# Patient Record
Sex: FEMALE | Race: OTHER | HISPANIC OR LATINO
[De-identification: names, ages, dates, MRNs, and addresses within clinical notes are randomized per-mention and may not be internally consistent; named-entity substitution may affect disease eponyms.]

---

## 2019-01-17 ENCOUNTER — TRANSCRIPTION ENCOUNTER (OUTPATIENT)
Age: 29
End: 2019-01-17

## 2019-01-25 ENCOUNTER — TRANSCRIPTION ENCOUNTER (OUTPATIENT)
Age: 29
End: 2019-01-25

## 2019-05-30 ENCOUNTER — TRANSCRIPTION ENCOUNTER (OUTPATIENT)
Age: 29
End: 2019-05-30

## 2019-05-31 ENCOUNTER — TRANSCRIPTION ENCOUNTER (OUTPATIENT)
Age: 29
End: 2019-05-31

## 2019-06-06 ENCOUNTER — APPOINTMENT (OUTPATIENT)
Dept: FAMILY MEDICINE | Facility: CLINIC | Age: 29
End: 2019-06-06
Payer: COMMERCIAL

## 2019-06-06 DIAGNOSIS — J45.20 MILD INTERMITTENT ASTHMA, UNCOMPLICATED: ICD-10-CM

## 2019-06-06 PROCEDURE — 99385 PREV VISIT NEW AGE 18-39: CPT

## 2019-06-06 PROCEDURE — 99213 OFFICE O/P EST LOW 20 MIN: CPT | Mod: 25

## 2019-06-06 NOTE — PLAN
[FreeTextEntry1] : Palpitations; likely 2/2 panic disorder\par - Recent EKG 5/31- NSR\par - Unlikely underlying cardiac etiology, symptoms likely have an anxiety component\par - Refer to cardiology, may need follow up with cardiology for Holter monitoring testing \par - Check routine bloodwork- CBC/BMP/TSH

## 2019-06-06 NOTE — PAST MEDICAL HISTORY
[Definite ___ (Date)] : the last menstrual period was [unfilled] [Abnormal Duration ___ days] : the duration was abnormal lasting [unfilled] days [Regular Cycle Intervals] : have been regular [Total Preg ___] : G: [unfilled]

## 2019-06-06 NOTE — REVIEW OF SYSTEMS
[Palpitations] : palpitations [Fever] : no fever [Fatigue] : no fatigue [Chills] : no chills [Chest Pain] : no chest pain [Night Sweats] : no night sweats [Lower Ext Edema] : no lower extremity edema [Orthopnea] : no orthopnea [Shortness Of Breath] : no shortness of breath [Wheezing] : no wheezing [Cough] : no cough [Nausea] : no nausea [Abdominal Pain] : no abdominal pain [Heartburn] : no heartburn [Vomiting] : no vomiting [Dysuria] : no dysuria [Incontinence] : no incontinence [Hematuria] : no hematuria [Joint Pain] : no joint pain [Headache] : no headache [Muscle Pain] : no muscle pain [Back Pain] : no back pain [Memory Loss] : no memory loss [Dizziness] : no dizziness [Fainting] : no fainting

## 2019-06-06 NOTE — PHYSICAL EXAM
[Well Nourished] : well nourished [No Acute Distress] : no acute distress [Well-Appearing] : well-appearing [Well Developed] : well developed [No JVD] : no jugular venous distention [Supple] : supple [No Respiratory Distress] : no respiratory distress  [Clear to Auscultation] : lungs were clear to auscultation bilaterally [Normal Rate] : normal rate  [Regular Rhythm] : with a regular rhythm [Normal S1, S2] : normal S1 and S2 [No Edema] : there was no peripheral edema [No Murmur] : no murmur heard [Soft] : abdomen soft [Non Tender] : non-tender [Normal Bowel Sounds] : normal bowel sounds [Non-distended] : non-distended [No HSM] : no HSM [No CVA Tenderness] : no CVA  tenderness [No Joint Swelling] : no joint swelling [No Rash] : no rash [Normal Gait] : normal gait [Normal Affect] : the affect was normal [Coordination Grossly Intact] : coordination grossly intact

## 2019-06-06 NOTE — HEALTH RISK ASSESSMENT
[0] : 2) Feeling down, depressed, or hopeless: Not at all (0) [[id number]] : 0 [de-identified] : Socially [] : No

## 2019-06-06 NOTE — HISTORY OF PRESENT ILLNESS
[FreeTextEntry8] : 28 YO F PMHx asthma and PCOS here for palpitations for 1 years, worsening over the last month. Tends to happen at work and when stress although last week awoke from sleep with palpitations, HR around 140s. No chest pain. Increased diaphoresis and SOB during these episodes. In college, was on Lexapro for 8 months but stopped. Drink 1 cup of coffee per day. No energy drinks. No smoking. Severe episodes occur approximately 1 time per month. Can feel episodes coming on, tries relaxation techniques. Recently at urgent care 5/31, EKG normal; BP ~ 139 systolic; HR in 80s. No medication given.

## 2019-06-13 ENCOUNTER — APPOINTMENT (OUTPATIENT)
Dept: CARDIOLOGY | Facility: CLINIC | Age: 29
End: 2019-06-13
Payer: COMMERCIAL

## 2019-06-13 ENCOUNTER — NON-APPOINTMENT (OUTPATIENT)
Age: 29
End: 2019-06-13

## 2019-06-13 VITALS
HEART RATE: 69 BPM | HEIGHT: 65 IN | SYSTOLIC BLOOD PRESSURE: 112 MMHG | WEIGHT: 180 LBS | OXYGEN SATURATION: 99 % | BODY MASS INDEX: 29.99 KG/M2 | DIASTOLIC BLOOD PRESSURE: 78 MMHG

## 2019-06-13 DIAGNOSIS — Z82.49 FAMILY HISTORY OF ISCHEMIC HEART DISEASE AND OTHER DISEASES OF THE CIRCULATORY SYSTEM: ICD-10-CM

## 2019-06-13 DIAGNOSIS — F41.9 ANXIETY DISORDER, UNSPECIFIED: ICD-10-CM

## 2019-06-13 PROCEDURE — 93000 ELECTROCARDIOGRAM COMPLETE: CPT

## 2019-06-13 PROCEDURE — 99204 OFFICE O/P NEW MOD 45 MIN: CPT | Mod: 25

## 2019-06-13 NOTE — ASSESSMENT
[FreeTextEntry1] : Patient with above hx \par \par recurrent palpitations with recent prolonged period of palpitations , possible PSVT ,   anxiety , \par \par \par would obtain routine blood work , echocardiogram to asses ventricular function , exercise stress test  to rule out exercise induced arrhythmia , 30 days  telemetry .\par \par  follow up after above test

## 2019-06-13 NOTE — HISTORY OF PRESENT ILLNESS
[FreeTextEntry1] : 29 year old female with hx prior hx of anxiety, asthma  who came for cardiac evaluation with complain that she was having palpitations on and off at work place for almost one year , but had palpitations while she a sleep , her heart rate was in 130 to 150s , associated with mild shortness  of breath  lasted for 2 minutes which warranted her to go urgent care , \par \par some time isolated  beats some time in group of beats ,  lasted for 2 minutes  no associated chest pain , Patient says some times it did occur with exercise ,    Patient does not drink much coffee\par \par Patient says she had anxiety , was on SSRI  , discontinued 6 years ago , \par  \par

## 2019-06-13 NOTE — DISCUSSION/SUMMARY
[Stable] : stable [Anxiety] : anxiety disorder NOS [None] : There are no changes in medication management [de-identified] : rule out SVT  [Patient] : the patient

## 2019-06-13 NOTE — PHYSICAL EXAM
[General Appearance - Well Developed] : well developed [Normal Conjunctiva] : the conjunctiva exhibited no abnormalities [Normal Oral Mucosa] : normal oral mucosa [Heart Rate And Rhythm] : heart rate and rhythm were normal [Normal Jugular Venous A Waves Present] : normal jugular venous A waves present [Heart Sounds] : normal S1 and S2 [Murmurs] : no murmurs present [Arterial Pulses Normal] : the arterial pulses were normal [Veins - Varicosity Changes] : no varicosital changes were noted in the lower extremities [Edema] : no peripheral edema present [Exaggerated Use Of Accessory Muscles For Inspiration] : no accessory muscle use [Respiration, Rhythm And Depth] : normal respiratory rhythm and effort [] : no respiratory distress [Auscultation Breath Sounds / Voice Sounds] : lungs were clear to auscultation bilaterally [Chest Palpation] : palpation of the chest revealed no abnormalities [Lungs Percussion] : the lungs were normal to percussion [Bowel Sounds] : normal bowel sounds [Abdomen Soft] : soft [Abnormal Walk] : normal gait [Nail Clubbing] : no clubbing of the fingernails [Cyanosis, Localized] : no localized cyanosis [Skin Color & Pigmentation] : normal skin color and pigmentation [Oriented To Time, Place, And Person] : oriented to person, place, and time

## 2019-06-18 ENCOUNTER — TRANSCRIPTION ENCOUNTER (OUTPATIENT)
Age: 29
End: 2019-06-18

## 2019-06-18 ENCOUNTER — APPOINTMENT (OUTPATIENT)
Dept: CARDIOLOGY | Facility: CLINIC | Age: 29
End: 2019-06-18
Payer: COMMERCIAL

## 2019-06-18 PROCEDURE — 93306 TTE W/DOPPLER COMPLETE: CPT

## 2019-06-19 ENCOUNTER — APPOINTMENT (OUTPATIENT)
Dept: CARDIOLOGY | Facility: CLINIC | Age: 29
End: 2019-06-19
Payer: COMMERCIAL

## 2019-06-19 PROCEDURE — 93015 CV STRESS TEST SUPVJ I&R: CPT

## 2019-06-20 ENCOUNTER — TRANSCRIPTION ENCOUNTER (OUTPATIENT)
Age: 29
End: 2019-06-20

## 2019-06-20 LAB
ALBUMIN SERPL ELPH-MCNC: 4.7 G/DL
ALP BLD-CCNC: 90 U/L
ALT SERPL-CCNC: 41 U/L
ANION GAP SERPL CALC-SCNC: 13 MMOL/L
AST SERPL-CCNC: 26 U/L
BASOPHILS # BLD AUTO: 0.07 K/UL
BASOPHILS NFR BLD AUTO: 0.8 %
BILIRUB SERPL-MCNC: 0.2 MG/DL
BUN SERPL-MCNC: 9 MG/DL
CALCIUM SERPL-MCNC: 9.6 MG/DL
CHLORIDE SERPL-SCNC: 103 MMOL/L
CHOLEST SERPL-MCNC: 233 MG/DL
CHOLEST/HDLC SERPL: 4 RATIO
CO2 SERPL-SCNC: 25 MMOL/L
CREAT SERPL-MCNC: 0.62 MG/DL
EOSINOPHIL # BLD AUTO: 0.17 K/UL
EOSINOPHIL NFR BLD AUTO: 1.9 %
GLUCOSE SERPL-MCNC: 91 MG/DL
HCT VFR BLD CALC: 42 %
HDLC SERPL-MCNC: 59 MG/DL
HGB BLD-MCNC: 12.9 G/DL
IMM GRANULOCYTES NFR BLD AUTO: 0.6 %
LDLC SERPL CALC-MCNC: 144 MG/DL
LYMPHOCYTES # BLD AUTO: 1.54 K/UL
LYMPHOCYTES NFR BLD AUTO: 17.2 %
MAN DIFF?: NORMAL
MCHC RBC-ENTMCNC: 30.4 PG
MCHC RBC-ENTMCNC: 30.7 GM/DL
MCV RBC AUTO: 98.8 FL
MONOCYTES # BLD AUTO: 0.61 K/UL
MONOCYTES NFR BLD AUTO: 6.8 %
NEUTROPHILS # BLD AUTO: 6.52 K/UL
NEUTROPHILS NFR BLD AUTO: 72.7 %
PLATELET # BLD AUTO: 376 K/UL
POTASSIUM SERPL-SCNC: 4.3 MMOL/L
PROT SERPL-MCNC: 7.4 G/DL
RBC # BLD: 4.25 M/UL
RBC # FLD: 12.5 %
SODIUM SERPL-SCNC: 141 MMOL/L
TRIGL SERPL-MCNC: 148 MG/DL
TSH SERPL-ACNC: 0.75 UIU/ML
WBC # FLD AUTO: 8.96 K/UL

## 2019-08-08 ENCOUNTER — APPOINTMENT (OUTPATIENT)
Dept: CARDIOLOGY | Facility: CLINIC | Age: 29
End: 2019-08-08

## 2019-09-24 ENCOUNTER — TRANSCRIPTION ENCOUNTER (OUTPATIENT)
Age: 29
End: 2019-09-24

## 2019-10-03 ENCOUNTER — FORM ENCOUNTER (OUTPATIENT)
Age: 29
End: 2019-10-03

## 2019-10-04 ENCOUNTER — OUTPATIENT (OUTPATIENT)
Dept: OUTPATIENT SERVICES | Facility: HOSPITAL | Age: 29
LOS: 1 days | End: 2019-10-04
Payer: COMMERCIAL

## 2019-10-04 ENCOUNTER — APPOINTMENT (OUTPATIENT)
Dept: PULMONOLOGY | Facility: CLINIC | Age: 29
End: 2019-10-04
Payer: COMMERCIAL

## 2019-10-04 VITALS
HEART RATE: 81 BPM | WEIGHT: 180 LBS | OXYGEN SATURATION: 98 % | BODY MASS INDEX: 29.99 KG/M2 | HEIGHT: 65 IN | DIASTOLIC BLOOD PRESSURE: 80 MMHG | SYSTOLIC BLOOD PRESSURE: 130 MMHG

## 2019-10-04 DIAGNOSIS — Z87.19 PERSONAL HISTORY OF OTHER DISEASES OF THE DIGESTIVE SYSTEM: ICD-10-CM

## 2019-10-04 DIAGNOSIS — Z87.42 PERSONAL HISTORY OF OTHER DISEASES OF THE FEMALE GENITAL TRACT: ICD-10-CM

## 2019-10-04 PROCEDURE — 71046 X-RAY EXAM CHEST 2 VIEWS: CPT | Mod: 26

## 2019-10-04 PROCEDURE — 99204 OFFICE O/P NEW MOD 45 MIN: CPT | Mod: 25

## 2019-10-04 PROCEDURE — 85018 HEMOGLOBIN: CPT | Mod: QW

## 2019-10-04 PROCEDURE — 94010 BREATHING CAPACITY TEST: CPT

## 2019-10-04 PROCEDURE — 70220 X-RAY EXAM OF SINUSES: CPT | Mod: 26

## 2019-10-04 PROCEDURE — 94729 DIFFUSING CAPACITY: CPT

## 2019-10-04 PROCEDURE — 94727 GAS DIL/WSHOT DETER LNG VOL: CPT

## 2019-10-04 NOTE — DISCUSSION/SUMMARY
[FreeTextEntry1] : \par #1. PFTs performed today are essentially normal.\par #2. The patient does not appear to require chronic BD therapy at this time\par #3. Proventil as needed\par #4. Continue Singulair for now\par #5. Diet and exercise for weight loss\par #6. Continue Flonase and add Astelin for PNDS\par #7. Consider ENT/allergy evaluation if no improvement\par #8. Check CXR and sinus xray to r/o infiltrate/sinusitis\par #9. F/u in 6 weeks\par

## 2019-10-04 NOTE — REVIEW OF SYSTEMS
[Nasal Congestion] : nasal congestion [Postnasal Drip] : postnasal drip [Sinus Problems] : sinus problems [Cough] : cough [Anxiety] : anxiety [Fever] : no fever [Chills] : no chills [Dry Eyes] : no dryness of the eyes [Eye Irritation] : no ~T irritation of the eyes [Epistaxis] : no nosebleeds [Dyspnea] : no dyspnea [Sputum] : not coughing up ~M sputum [Chest Tightness] : no chest tightness [Wheezing] : no wheezing [Pleuritic Pain] : no pleuritic pain [Hypertension] : no ~T hypertension [Chest Discomfort] : no chest discomfort [Dysrhythmia] : no dysrhythmia [Murmurs] : no murmurs were heard [Palpitations] : no palpitations [Edema] : ~T edema was not present [Hay Fever] : no hay fever [Itchy Eyes] : no itching of ~T the eyes [Reflux] : no reflux [Nausea] : no nausea [Vomiting] : no vomiting [Constipation] : no constipation [Diarrhea] : no diarrhea [Abdominal Pain] : no abdominal pain [Trauma] : no ~T physical trauma [Fracture] : no fracture [Anemia] : no anemia [Headache] : no headache [Dizziness] : no dizziness [Syncope] : no fainting [Numbness] : no numbness [Paralysis] : no paralysis was seen [Seizures] : no seizures [Depression] : no depression [Diabetes] : no diabetes mellitus [Thyroid Problem] : no thyroid problem

## 2019-10-04 NOTE — REASON FOR VISIT
[Initial Evaluation] : an initial evaluation [Asthma] : asthma [Shortness of Breath] : shortness of Breath [Cough] : cough [FreeTextEntry2] : weight issues

## 2019-10-04 NOTE — HISTORY OF PRESENT ILLNESS
[FreeTextEntry1] : The patient presents for a cough over the past week. She reports a h/o childhood asthma for which she was on Asmanex but did not require chronic therapy as an adult. She reports some increased symptoms of chest tightness and SOB this past summer and more recently was seen in UC for cough and SOB. She was given abx, Montelukast, and 50 mg of prednisone for 5 days. She reports that she was improving with prednisone but with recurrence of symptoms now. She c/o nasal and sinus congestion as well as PNDS.

## 2019-10-04 NOTE — CONSULT LETTER
[Dear  ___] : Dear  [unfilled], [Consult Letter:] : I had the pleasure of evaluating your patient, [unfilled]. [Please see my note below.] : Please see my note below. [Consult Closing:] : Thank you very much for allowing me to participate in the care of this patient.  If you have any questions, please do not hesitate to contact me. [Sincerely,] : Sincerely, [FreeTextEntry3] : Augustus Vuong MD, FCCP, D. ABSM\par Pulmonary and Sleep Medicine\par [facility] [place]\par

## 2019-10-04 NOTE — PHYSICAL EXAM
[General Appearance - Well Developed] : well developed [Normal Appearance] : normal appearance [General Appearance - In No Acute Distress] : no acute distress [Normal Conjunctiva] : the conjunctiva exhibited no abnormalities [Elongated Uvula] : elongated uvula [Low Lying Soft Palate] : low lying soft palate [Enlarged Base of the Tongue] : enlargement of the base of the tongue [III] : III [Neck Appearance] : the appearance of the neck was normal [Heart Rate And Rhythm] : heart rate and rhythm were normal [Murmurs] : no murmurs present [Heart Sounds] : normal S1 and S2 [Edema] : no peripheral edema present [] : no respiratory distress [Respiration, Rhythm And Depth] : normal respiratory rhythm and effort [Exaggerated Use Of Accessory Muscles For Inspiration] : no accessory muscle use [Auscultation Breath Sounds / Voice Sounds] : lungs were clear to auscultation bilaterally [Abdomen Soft] : soft [Abdomen Tenderness] : non-tender [Abnormal Walk] : normal gait [Nail Clubbing] : no clubbing of the fingernails [Cyanosis, Localized] : no localized cyanosis [No Focal Deficits] : no focal deficits [Oriented To Time, Place, And Person] : oriented to person, place, and time [FreeTextEntry1] : No abnormalities.

## 2019-10-07 ENCOUNTER — RESULT REVIEW (OUTPATIENT)
Age: 29
End: 2019-10-07

## 2019-10-30 ENCOUNTER — APPOINTMENT (OUTPATIENT)
Dept: INFECTIOUS DISEASE | Facility: CLINIC | Age: 29
End: 2019-10-30
Payer: SELF-PAY

## 2019-10-30 DIAGNOSIS — Z71.89 OTHER SPECIFIED COUNSELING: ICD-10-CM

## 2019-10-30 PROCEDURE — 90691 TYPHOID VACCINE IM: CPT

## 2019-10-30 PROCEDURE — 90738 INACTIVATED JE VACC IM: CPT

## 2019-10-30 PROCEDURE — 90632 HEPA VACCINE ADULT IM: CPT

## 2019-10-30 PROCEDURE — 90471 IMMUNIZATION ADMIN: CPT | Mod: NC

## 2019-10-30 PROCEDURE — 99401 PREV MED CNSL INDIV APPRX 15: CPT | Mod: 25

## 2019-10-30 PROCEDURE — 90472 IMMUNIZATION ADMIN EACH ADD: CPT | Mod: NC,59

## 2019-11-07 ENCOUNTER — APPOINTMENT (OUTPATIENT)
Dept: INFECTIOUS DISEASE | Facility: HOSPITAL | Age: 29
End: 2019-11-07
Payer: SELF-PAY

## 2019-11-07 PROCEDURE — 90471 IMMUNIZATION ADMIN: CPT | Mod: NC

## 2019-11-07 PROCEDURE — 90738 INACTIVATED JE VACC IM: CPT

## 2019-11-27 ENCOUNTER — APPOINTMENT (OUTPATIENT)
Dept: PULMONOLOGY | Facility: CLINIC | Age: 29
End: 2019-11-27

## 2019-12-02 ENCOUNTER — APPOINTMENT (OUTPATIENT)
Dept: PULMONOLOGY | Facility: CLINIC | Age: 29
End: 2019-12-02

## 2020-01-13 NOTE — COUNSELING
[Breast Self Exam] : breast self exam [Nutrition] : nutrition [Exercise] : exercise [Vitamins/Supplements] : vitamins/supplements [STD (testing, results, tx)] : STD (testing, results, tx) [Contraception] : contraception [Emergency Contraception] : emergency contraception [Smoking Cessation] : smoking cessation

## 2020-01-14 ENCOUNTER — APPOINTMENT (OUTPATIENT)
Dept: OBGYN | Facility: CLINIC | Age: 30
End: 2020-01-14
Payer: COMMERCIAL

## 2020-01-14 VITALS
WEIGHT: 166 LBS | SYSTOLIC BLOOD PRESSURE: 110 MMHG | HEIGHT: 65 IN | BODY MASS INDEX: 27.66 KG/M2 | DIASTOLIC BLOOD PRESSURE: 70 MMHG | HEART RATE: 81 BPM | OXYGEN SATURATION: 99 %

## 2020-01-14 LAB
BILIRUB UR QL STRIP: NEGATIVE
CLARITY UR: CLEAR
COLLECTION METHOD: NORMAL
GLUCOSE UR-MCNC: NEGATIVE
HCG UR QL: 0.2 EU/DL
HCG UR QL: NEGATIVE
HGB UR QL STRIP.AUTO: NEGATIVE
KETONES UR-MCNC: NEGATIVE
LEUKOCYTE ESTERASE UR QL STRIP: NEGATIVE
NITRITE UR QL STRIP: NEGATIVE
PH UR STRIP: 7
PROT UR STRIP-MCNC: NEGATIVE
QUALITY CONTROL: YES
SP GR UR STRIP: 1.01

## 2020-01-14 PROCEDURE — 99385 PREV VISIT NEW AGE 18-39: CPT

## 2020-01-14 RX ORDER — PREDNISONE 10 MG/1
10 TABLET ORAL DAILY
Qty: 100 | Refills: 0 | Status: DISCONTINUED | COMMUNITY
Start: 2019-10-04 | End: 2020-01-14

## 2020-01-14 RX ORDER — ATOVAQUONE AND PROGUANIL HYDROCHLORIDE 250; 100 MG/1; MG/1
250-100 TABLET, FILM COATED ORAL DAILY
Qty: 10 | Refills: 0 | Status: DISCONTINUED | COMMUNITY
Start: 2019-10-30 | End: 2020-01-14

## 2020-01-14 RX ORDER — AZITHROMYCIN 500 MG/1
500 TABLET, FILM COATED ORAL DAILY
Qty: 7 | Refills: 0 | Status: DISCONTINUED | COMMUNITY
Start: 2019-10-30 | End: 2020-01-14

## 2020-01-14 NOTE — HISTORY OF PRESENT ILLNESS
[Good] : being in good health [Reproductive Age] : is of reproductive age [Yes] : yes [Last Colonoscopy ___] : Last colonoscopy [unfilled] [Last Pap ___] : Last cervical pap smear was [unfilled] [Sexually Active] : is sexually active

## 2020-01-15 LAB
C TRACH RRNA SPEC QL NAA+PROBE: NOT DETECTED
DHEA-S SERPL-MCNC: 620 UG/DL
FSH SERPL-MCNC: 6.6 IU/L
LH SERPL-ACNC: 9.9 IU/L
N GONORRHOEA RRNA SPEC QL NAA+PROBE: NOT DETECTED
PROLACTIN SERPL-MCNC: 6.8 NG/ML
SOURCE TP AMPLIFICATION: NORMAL
TSH SERPL-ACNC: 0.61 UIU/ML

## 2020-01-18 LAB
CYTOLOGY CVX/VAG DOC THIN PREP: NORMAL
TESTOST BND SERPL-MCNC: 8.7 PG/ML
TESTOST SERPL-MCNC: 35.6 NG/DL

## 2020-01-20 LAB — 17OHP SERPL-MCNC: 35 NG/DL

## 2020-01-27 ENCOUNTER — FORM ENCOUNTER (OUTPATIENT)
Age: 30
End: 2020-01-27

## 2020-01-28 ENCOUNTER — APPOINTMENT (OUTPATIENT)
Dept: ULTRASOUND IMAGING | Facility: CLINIC | Age: 30
End: 2020-01-28
Payer: COMMERCIAL

## 2020-01-28 ENCOUNTER — OUTPATIENT (OUTPATIENT)
Dept: OUTPATIENT SERVICES | Facility: HOSPITAL | Age: 30
LOS: 1 days | End: 2020-01-28
Payer: COMMERCIAL

## 2020-01-28 DIAGNOSIS — N92.6 IRREGULAR MENSTRUATION, UNSPECIFIED: ICD-10-CM

## 2020-01-28 DIAGNOSIS — Z00.00 ENCOUNTER FOR GENERAL ADULT MEDICAL EXAMINATION WITHOUT ABNORMAL FINDINGS: ICD-10-CM

## 2020-01-28 PROCEDURE — 76856 US EXAM PELVIC COMPLETE: CPT | Mod: 26

## 2020-01-28 PROCEDURE — 76856 US EXAM PELVIC COMPLETE: CPT

## 2020-01-28 PROCEDURE — 76830 TRANSVAGINAL US NON-OB: CPT | Mod: 26

## 2020-01-28 PROCEDURE — 76830 TRANSVAGINAL US NON-OB: CPT

## 2020-01-29 ENCOUNTER — TRANSCRIPTION ENCOUNTER (OUTPATIENT)
Age: 30
End: 2020-01-29

## 2020-02-04 ENCOUNTER — TRANSCRIPTION ENCOUNTER (OUTPATIENT)
Age: 30
End: 2020-02-04

## 2020-02-13 ENCOUNTER — APPOINTMENT (OUTPATIENT)
Dept: OBGYN | Facility: CLINIC | Age: 30
End: 2020-02-13
Payer: COMMERCIAL

## 2020-02-13 VITALS
BODY MASS INDEX: 27.66 KG/M2 | DIASTOLIC BLOOD PRESSURE: 72 MMHG | RESPIRATION RATE: 99 BRPM | WEIGHT: 166 LBS | HEIGHT: 65 IN | SYSTOLIC BLOOD PRESSURE: 112 MMHG | HEART RATE: 77 BPM

## 2020-02-13 DIAGNOSIS — N92.6 IRREGULAR MENSTRUATION, UNSPECIFIED: ICD-10-CM

## 2020-02-13 DIAGNOSIS — L70.9 ACNE, UNSPECIFIED: ICD-10-CM

## 2020-02-13 PROCEDURE — 99214 OFFICE O/P EST MOD 30 MIN: CPT

## 2020-02-13 NOTE — PHYSICAL EXAM
[Awake] : awake [Alert] : alert [Soft] : soft [Oriented x3] : oriented to person, place, and time [Normal] : uterus [No Bleeding] : there was no active vaginal bleeding [Uterine Adnexae] : were not tender and not enlarged [Acute Distress] : no acute distress [Nipple Discharge] : no nipple discharge [Mass] : no breast mass [Axillary LAD] : no axillary lymphadenopathy [Tender] : non tender

## 2020-02-13 NOTE — CHIEF COMPLAINT
[Follow Up] : follow up GYN visit [FreeTextEntry1] : Pt here f/u menstrual disorder--improved with 20lb wt loss,hirsuite and acne---rev b/w and of concern DHEAS 620---concern adrenal neoplasm. Rev rest of b/w free test als elev.,rev TVS as well

## 2020-02-13 NOTE — CHIEF COMPLAINT
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

## 2020-03-03 ENCOUNTER — FORM ENCOUNTER (OUTPATIENT)
Age: 30
End: 2020-03-03

## 2020-03-04 ENCOUNTER — APPOINTMENT (OUTPATIENT)
Dept: CT IMAGING | Facility: CLINIC | Age: 30
End: 2020-03-04
Payer: COMMERCIAL

## 2020-03-04 ENCOUNTER — OUTPATIENT (OUTPATIENT)
Dept: OUTPATIENT SERVICES | Facility: HOSPITAL | Age: 30
LOS: 1 days | End: 2020-03-04
Payer: COMMERCIAL

## 2020-03-04 DIAGNOSIS — E27.8 OTHER SPECIFIED DISORDERS OF ADRENAL GLAND: ICD-10-CM

## 2020-03-04 PROCEDURE — 74150 CT ABDOMEN W/O CONTRAST: CPT

## 2020-03-04 PROCEDURE — 74150 CT ABDOMEN W/O CONTRAST: CPT | Mod: 26

## 2020-03-05 DIAGNOSIS — R93.89 ABNORMAL FINDINGS ON DIAGNOSTIC IMAGING OF OTHER SPECIFIED BODY STRUCTURES: ICD-10-CM

## 2020-03-13 ENCOUNTER — FORM ENCOUNTER (OUTPATIENT)
Age: 30
End: 2020-03-13

## 2020-03-13 DIAGNOSIS — N28.1 CYST OF KIDNEY, ACQUIRED: ICD-10-CM

## 2020-03-14 ENCOUNTER — APPOINTMENT (OUTPATIENT)
Dept: MRI IMAGING | Facility: CLINIC | Age: 30
End: 2020-03-14
Payer: COMMERCIAL

## 2020-03-14 ENCOUNTER — OUTPATIENT (OUTPATIENT)
Dept: OUTPATIENT SERVICES | Facility: HOSPITAL | Age: 30
LOS: 1 days | End: 2020-03-14
Payer: COMMERCIAL

## 2020-03-14 DIAGNOSIS — Z00.00 ENCOUNTER FOR GENERAL ADULT MEDICAL EXAMINATION WITHOUT ABNORMAL FINDINGS: ICD-10-CM

## 2020-03-14 DIAGNOSIS — R93.89 ABNORMAL FINDINGS ON DIAGNOSTIC IMAGING OF OTHER SPECIFIED BODY STRUCTURES: ICD-10-CM

## 2020-03-14 PROCEDURE — 74181 MRI ABDOMEN W/O CONTRAST: CPT | Mod: 26

## 2020-03-14 PROCEDURE — 74181 MRI ABDOMEN W/O CONTRAST: CPT

## 2020-03-18 PROBLEM — N28.1 RENAL CYST: Status: ACTIVE | Noted: 2020-03-18

## 2020-04-10 ENCOUNTER — TRANSCRIPTION ENCOUNTER (OUTPATIENT)
Age: 30
End: 2020-04-10

## 2020-04-21 ENCOUNTER — TRANSCRIPTION ENCOUNTER (OUTPATIENT)
Age: 30
End: 2020-04-21

## 2020-04-25 ENCOUNTER — MESSAGE (OUTPATIENT)
Age: 30
End: 2020-04-25

## 2020-04-26 ENCOUNTER — TRANSCRIPTION ENCOUNTER (OUTPATIENT)
Age: 30
End: 2020-04-26

## 2020-04-26 DIAGNOSIS — E27.8 OTHER SPECIFIED DISORDERS OF ADRENAL GLAND: ICD-10-CM

## 2020-05-02 LAB
SARS-COV-2 IGG SERPL IA-ACNC: 0 INDEX
SARS-COV-2 IGG SERPL QL IA: NEGATIVE

## 2020-06-30 ENCOUNTER — APPOINTMENT (OUTPATIENT)
Dept: UROLOGY | Facility: CLINIC | Age: 30
End: 2020-06-30
Payer: COMMERCIAL

## 2020-06-30 VITALS
SYSTOLIC BLOOD PRESSURE: 135 MMHG | RESPIRATION RATE: 17 BRPM | HEART RATE: 109 BPM | HEIGHT: 65 IN | DIASTOLIC BLOOD PRESSURE: 87 MMHG | WEIGHT: 171 LBS | BODY MASS INDEX: 28.49 KG/M2

## 2020-06-30 VITALS — TEMPERATURE: 97.2 F

## 2020-06-30 PROCEDURE — 99203 OFFICE O/P NEW LOW 30 MIN: CPT

## 2020-06-30 NOTE — REVIEW OF SYSTEMS
[Dry Eyes] : dryness of the eyes [see HPI] : see HPI [Urine retention] : urine retention [Strain or push to urinate] : strain or push to urinate [Joint Pain] : joint pain [Slow urine stream] : slow urine stream [Wait a long time to urinate] : waits a long time to urinate [Anxiety] : anxiety [Negative] : Heme/Lymph

## 2020-06-30 NOTE — HISTORY OF PRESENT ILLNESS
[FreeTextEntry1] : PT is a 31 yo female with history of PCOS who was found to have left renal and left adrenal cysts. SImple cysts on MRI. Denies hematuria, weight loss, dysuria. 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 \par Patient has no other complaints at this time.

## 2020-06-30 NOTE — PHYSICAL EXAM
[General Appearance - Well Developed] : well developed [General Appearance - Well Nourished] : well nourished [Normal Appearance] : normal appearance [Well Groomed] : well groomed [General Appearance - In No Acute Distress] : no acute distress [Edema] : no peripheral edema [Respiration, Rhythm And Depth] : normal respiratory rhythm and effort [Exaggerated Use Of Accessory Muscles For Inspiration] : no accessory muscle use [Abdomen Soft] : soft [Abdomen Tenderness] : non-tender [Costovertebral Angle Tenderness] : no ~M costovertebral angle tenderness [] : no rash [Normal Station and Gait] : the gait and station were normal for the patient's age [Oriented To Time, Place, And Person] : oriented to person, place, and time [No Focal Deficits] : no focal deficits [Affect] : the affect was normal [Mood] : the mood was normal [Not Anxious] : not anxious [No Palpable Adenopathy] : no palpable adenopathy

## 2020-06-30 NOTE — ASSESSMENT
[FreeTextEntry1] : PT is a 31 yo female with history of PCOS and simple cyst on kidney and adrenals. Also complains of urinary retentions.\par - Patient advised to void regularly to prevent overfilling.\par - No indication for follow up for simple cysts. \par - Patient to follow up as needed.

## 2020-06-30 NOTE — PHYSICAL EXAM
[General Appearance - Well Developed] : well developed [General Appearance - Well Nourished] : well nourished [Normal Appearance] : normal appearance [Well Groomed] : well groomed [General Appearance - In No Acute Distress] : no acute distress [Edema] : no peripheral edema [Respiration, Rhythm And Depth] : normal respiratory rhythm and effort [Exaggerated Use Of Accessory Muscles For Inspiration] : no accessory muscle use [Abdomen Soft] : soft [Abdomen Tenderness] : non-tender [Costovertebral Angle Tenderness] : no ~M costovertebral angle tenderness [Normal Station and Gait] : the gait and station were normal for the patient's age [] : no rash [Oriented To Time, Place, And Person] : oriented to person, place, and time [Affect] : the affect was normal [No Focal Deficits] : no focal deficits [Not Anxious] : not anxious [Mood] : the mood was normal [No Palpable Adenopathy] : no palpable adenopathy

## 2020-06-30 NOTE — REVIEW OF SYSTEMS
[Dry Eyes] : dryness of the eyes [see HPI] : see HPI [Strain or push to urinate] : strain or push to urinate [Urine retention] : urine retention [Wait a long time to urinate] : waits a long time to urinate [Slow urine stream] : slow urine stream [Joint Pain] : joint pain [Anxiety] : anxiety [Negative] : Heme/Lymph

## 2020-07-20 ENCOUNTER — TRANSCRIPTION ENCOUNTER (OUTPATIENT)
Age: 30
End: 2020-07-20

## 2020-08-12 ENCOUNTER — TRANSCRIPTION ENCOUNTER (OUTPATIENT)
Age: 30
End: 2020-08-12

## 2020-10-13 ENCOUNTER — EMERGENCY (EMERGENCY)
Facility: HOSPITAL | Age: 30
LOS: 1 days | Discharge: HOME | End: 2020-10-13
Attending: EMERGENCY MEDICINE
Payer: COMMERCIAL

## 2020-10-13 VITALS
SYSTOLIC BLOOD PRESSURE: 162 MMHG | OXYGEN SATURATION: 98 % | TEMPERATURE: 99 F | RESPIRATION RATE: 20 BRPM | DIASTOLIC BLOOD PRESSURE: 109 MMHG | HEART RATE: 112 BPM

## 2020-10-13 VITALS
DIASTOLIC BLOOD PRESSURE: 82 MMHG | OXYGEN SATURATION: 99 % | HEART RATE: 80 BPM | SYSTOLIC BLOOD PRESSURE: 119 MMHG | TEMPERATURE: 98 F

## 2020-10-13 LAB
ALBUMIN SERPL ELPH-MCNC: 4.2 G/DL — SIGNIFICANT CHANGE UP (ref 3.3–5.2)
ALP SERPL-CCNC: 66 U/L — SIGNIFICANT CHANGE UP (ref 40–120)
ALT FLD-CCNC: 20 U/L — SIGNIFICANT CHANGE UP
ANION GAP SERPL CALC-SCNC: 13 MMOL/L — SIGNIFICANT CHANGE UP (ref 5–17)
AST SERPL-CCNC: 26 U/L — SIGNIFICANT CHANGE UP
BASOPHILS # BLD AUTO: 0.08 K/UL — SIGNIFICANT CHANGE UP (ref 0–0.2)
BASOPHILS NFR BLD AUTO: 0.7 % — SIGNIFICANT CHANGE UP (ref 0–2)
BILIRUB SERPL-MCNC: 0.3 MG/DL — LOW (ref 0.4–2)
BUN SERPL-MCNC: 11 MG/DL — SIGNIFICANT CHANGE UP (ref 8–20)
CALCIUM SERPL-MCNC: 9.1 MG/DL — SIGNIFICANT CHANGE UP (ref 8.6–10.2)
CHLORIDE SERPL-SCNC: 102 MMOL/L — SIGNIFICANT CHANGE UP (ref 98–107)
CO2 SERPL-SCNC: 24 MMOL/L — SIGNIFICANT CHANGE UP (ref 22–29)
CREAT SERPL-MCNC: 0.63 MG/DL — SIGNIFICANT CHANGE UP (ref 0.5–1.3)
EOSINOPHIL # BLD AUTO: 0.12 K/UL — SIGNIFICANT CHANGE UP (ref 0–0.5)
EOSINOPHIL NFR BLD AUTO: 1.1 % — SIGNIFICANT CHANGE UP (ref 0–6)
GLUCOSE SERPL-MCNC: 91 MG/DL — SIGNIFICANT CHANGE UP (ref 70–99)
HCG SERPL-ACNC: <4 MIU/ML — SIGNIFICANT CHANGE UP
HCT VFR BLD CALC: 41.9 % — SIGNIFICANT CHANGE UP (ref 34.5–45)
HGB BLD-MCNC: 13.9 G/DL — SIGNIFICANT CHANGE UP (ref 11.5–15.5)
IMM GRANULOCYTES NFR BLD AUTO: 0.4 % — SIGNIFICANT CHANGE UP (ref 0–1.5)
LYMPHOCYTES # BLD AUTO: 1.85 K/UL — SIGNIFICANT CHANGE UP (ref 1–3.3)
LYMPHOCYTES # BLD AUTO: 16.2 % — SIGNIFICANT CHANGE UP (ref 13–44)
MCHC RBC-ENTMCNC: 30.5 PG — SIGNIFICANT CHANGE UP (ref 27–34)
MCHC RBC-ENTMCNC: 33.2 GM/DL — SIGNIFICANT CHANGE UP (ref 32–36)
MCV RBC AUTO: 92.1 FL — SIGNIFICANT CHANGE UP (ref 80–100)
MONOCYTES # BLD AUTO: 0.67 K/UL — SIGNIFICANT CHANGE UP (ref 0–0.9)
MONOCYTES NFR BLD AUTO: 5.9 % — SIGNIFICANT CHANGE UP (ref 2–14)
NEUTROPHILS # BLD AUTO: 8.65 K/UL — HIGH (ref 1.8–7.4)
NEUTROPHILS NFR BLD AUTO: 75.7 % — SIGNIFICANT CHANGE UP (ref 43–77)
PLATELET # BLD AUTO: 369 K/UL — SIGNIFICANT CHANGE UP (ref 150–400)
POTASSIUM SERPL-MCNC: 4.2 MMOL/L — SIGNIFICANT CHANGE UP (ref 3.5–5.3)
POTASSIUM SERPL-SCNC: 4.2 MMOL/L — SIGNIFICANT CHANGE UP (ref 3.5–5.3)
PROT SERPL-MCNC: 7.9 G/DL — SIGNIFICANT CHANGE UP (ref 6.6–8.7)
RAPID RVP RESULT: DETECTED
RBC # BLD: 4.55 M/UL — SIGNIFICANT CHANGE UP (ref 3.8–5.2)
RBC # FLD: 11.7 % — SIGNIFICANT CHANGE UP (ref 10.3–14.5)
RV+EV RNA SPEC QL NAA+PROBE: DETECTED
SARS-COV-2 RNA SPEC QL NAA+PROBE: SIGNIFICANT CHANGE UP
SODIUM SERPL-SCNC: 139 MMOL/L — SIGNIFICANT CHANGE UP (ref 135–145)
WBC # BLD: 11.42 K/UL — HIGH (ref 3.8–10.5)
WBC # FLD AUTO: 11.42 K/UL — HIGH (ref 3.8–10.5)

## 2020-10-13 PROCEDURE — 99284 EMERGENCY DEPT VISIT MOD MDM: CPT | Mod: 25

## 2020-10-13 PROCEDURE — 85025 COMPLETE CBC W/AUTO DIFF WBC: CPT

## 2020-10-13 PROCEDURE — 0225U NFCT DS DNA&RNA 21 SARSCOV2: CPT

## 2020-10-13 PROCEDURE — 99284 EMERGENCY DEPT VISIT MOD MDM: CPT

## 2020-10-13 PROCEDURE — U0003: CPT

## 2020-10-13 PROCEDURE — 96374 THER/PROPH/DIAG INJ IV PUSH: CPT

## 2020-10-13 PROCEDURE — 80053 COMPREHEN METABOLIC PANEL: CPT

## 2020-10-13 PROCEDURE — 36415 COLL VENOUS BLD VENIPUNCTURE: CPT

## 2020-10-13 PROCEDURE — 84702 CHORIONIC GONADOTROPIN TEST: CPT

## 2020-10-13 RX ORDER — ONDANSETRON 8 MG/1
1 TABLET, FILM COATED ORAL
Qty: 12 | Refills: 0
Start: 2020-10-13 | End: 2020-10-16

## 2020-10-13 RX ORDER — SODIUM CHLORIDE 9 MG/ML
1000 INJECTION INTRAMUSCULAR; INTRAVENOUS; SUBCUTANEOUS ONCE
Refills: 0 | Status: COMPLETED | OUTPATIENT
Start: 2020-10-13 | End: 2020-10-13

## 2020-10-13 RX ORDER — ACETAMINOPHEN 500 MG
975 TABLET ORAL ONCE
Refills: 0 | Status: COMPLETED | OUTPATIENT
Start: 2020-10-13 | End: 2020-10-13

## 2020-10-13 RX ORDER — ONDANSETRON 8 MG/1
4 TABLET, FILM COATED ORAL ONCE
Refills: 0 | Status: COMPLETED | OUTPATIENT
Start: 2020-10-13 | End: 2020-10-13

## 2020-10-13 RX ADMIN — Medication 975 MILLIGRAM(S): at 11:34

## 2020-10-13 RX ADMIN — ONDANSETRON 4 MILLIGRAM(S): 8 TABLET, FILM COATED ORAL at 10:44

## 2020-10-13 RX ADMIN — SODIUM CHLORIDE 1000 MILLILITER(S): 9 INJECTION INTRAMUSCULAR; INTRAVENOUS; SUBCUTANEOUS at 10:45

## 2020-10-13 NOTE — ED ADULT NURSE NOTE - OBJECTIVE STATEMENT
pt presents to ed a&ox3, no acute distress, breaths even and unlabored c/o headache, nausea, body aches and chills x few days but worsening today with hot flash while working. pt presents with low grade fever and tachycardic. denies taking tylenol or motrin at home. pt denies any known covid contacts.

## 2020-10-13 NOTE — ED PROVIDER NOTE - NSFOLLOWUPINSTRUCTIONS_ED_ALL_ED_FT
READ ALL ATTACHED INSTRUCTIONS FOR CORONAVIRUS IMMEDIATELY   -You were tested for COVID19 (Coronavirus) during your visit in the Emergency Department.   -Results will take 1-2 days  -Do NOT return to work/school/public areas until your COVID test results as negative.   -SELF QUARANTINE until COVID result is available.   -Avoid contact with others.   -Wash your hands frequently. Disinfect surfaces frequently    SEEK IMMEDIATE MEDICAL CARE IF YOU HAVE ANY OF THE FOLLOWING SYMPTOMS  **If you develop worsening or new symptoms such as shortness of breath, difficulty breathing, chest pain, confusion, severe weakness, or anything concerning to you, please seek immediate medical care or return to the ER .**   Nausea / Vomiting    Nausea is the feeling that you have to vomit. As nausea gets worse, it can lead to vomiting. Vomiting puts you at an increased risk for dehydration. Older adults and people with other diseases or a weak immune system are at higher risk for dehydration. Drink clear fluids in small but frequent amounts as tolerated. Eat bland, easy-to-digest foods in small amounts as tolerated.    SEEK IMMEDIATE MEDICAL CARE IF YOU HAVE ANY OF THE FOLLOWING SYMPTOMS: fever, inability to keep sufficient fluids down, black or bloody vomitus, black or bloody stools, lightheadedness/dizziness, chest pain, severe headache, rash, shortness of breath, cold or clammy skin, confusion, pain with urination, or any signs of dehydration.   Treat Fever with Motrin and Tylenol as directed on bottle as needed.  Follow up with your primary care physician within 24 to 48 hours.

## 2020-10-13 NOTE — ED PROVIDER NOTE - CLINICAL SUMMARY MEDICAL DECISION MAKING FREE TEXT BOX
malaise, fever and body aches for few days; labs, meds, IVF malaise, fever and body aches for few days; labs, meds, IVF.

## 2020-10-13 NOTE — ED PROVIDER NOTE - PROGRESS NOTE DETAILS
pt states she is feeling better. Labs reviewed and discussed with pt. Pending COVID and RVP results. Will dc home. Strict ED return precautions given and pt verbalized understanding. Will have pt self quarantine till negative result. RX Zofran.

## 2020-10-13 NOTE — ED ADULT TRIAGE NOTE - CHIEF COMPLAINT QUOTE
Pt c/o flu-like symptoms x1 day. Reports feeling feverish, sore throat, headache, abdominal pain with nausea and "feeling ill."

## 2020-10-13 NOTE — ED PROVIDER NOTE - PATIENT PORTAL LINK FT
You can access the FollowMyHealth Patient Portal offered by [facility] by registering at the following website: [email or web address]. By joining [facility]’s FollowMyHealth portal, you will also be able to view your health information using other applications (apps) compatible with our system.

## 2020-10-13 NOTE — ED ADULT NURSE NOTE - NSIMPLEMENTINTERV_GEN_ALL_ED
Implemented All Universal Safety Interventions:  [place] to call system. Call bell, personal items and telephone within reach. Instruct patient to call for assistance. Room bathroom lighting operational. Non-slip footwear when patient is off stretcher. Physically safe environment: no spills, clutter or unnecessary equipment. Stretcher in lowest position, wheels locked, appropriate side rails in place.

## 2020-10-15 ENCOUNTER — TRANSCRIPTION ENCOUNTER (OUTPATIENT)
Age: 30
End: 2020-10-15

## 2020-10-16 ENCOUNTER — APPOINTMENT (OUTPATIENT)
Dept: OBGYN | Facility: CLINIC | Age: 30
End: 2020-10-16

## 2020-10-16 DIAGNOSIS — N94.6 DYSMENORRHEA, UNSPECIFIED: ICD-10-CM

## 2020-11-18 ENCOUNTER — TRANSCRIPTION ENCOUNTER (OUTPATIENT)
Age: 30
End: 2020-11-18

## 2020-12-13 ENCOUNTER — TRANSCRIPTION ENCOUNTER (OUTPATIENT)
Age: 30
End: 2020-12-13

## 2020-12-13 DIAGNOSIS — N76.0 ACUTE VAGINITIS: ICD-10-CM

## 2021-01-15 ENCOUNTER — TRANSCRIPTION ENCOUNTER (OUTPATIENT)
Age: 31
End: 2021-01-15

## 2021-01-15 RX ORDER — DESOGESTREL AND ETHINYL ESTRADIOL 0.15-0.03
0.15-3 KIT ORAL DAILY
Qty: 3 | Refills: 0 | Status: COMPLETED | COMMUNITY
Start: 2020-02-13 | End: 2021-01-15

## 2021-01-21 ENCOUNTER — LABORATORY RESULT (OUTPATIENT)
Age: 31
End: 2021-01-21

## 2021-01-21 ENCOUNTER — APPOINTMENT (OUTPATIENT)
Dept: RHEUMATOLOGY | Facility: CLINIC | Age: 31
End: 2021-01-21
Payer: COMMERCIAL

## 2021-01-21 VITALS
HEART RATE: 80 BPM | BODY MASS INDEX: 29.99 KG/M2 | TEMPERATURE: 97.2 F | OXYGEN SATURATION: 98 % | WEIGHT: 180 LBS | SYSTOLIC BLOOD PRESSURE: 139 MMHG | DIASTOLIC BLOOD PRESSURE: 95 MMHG | HEIGHT: 65 IN

## 2021-01-21 DIAGNOSIS — M21.70 UNEQUAL LIMB LENGTH (ACQUIRED), UNSPECIFIED SITE: ICD-10-CM

## 2021-01-21 DIAGNOSIS — M70.61 TROCHANTERIC BURSITIS, RIGHT HIP: ICD-10-CM

## 2021-01-21 PROCEDURE — 99205 OFFICE O/P NEW HI 60 MIN: CPT

## 2021-01-21 PROCEDURE — 99072 ADDL SUPL MATRL&STAF TM PHE: CPT

## 2021-01-27 ENCOUNTER — OUTPATIENT (OUTPATIENT)
Dept: OUTPATIENT SERVICES | Facility: HOSPITAL | Age: 31
LOS: 1 days | End: 2021-01-27
Payer: COMMERCIAL

## 2021-01-27 ENCOUNTER — APPOINTMENT (OUTPATIENT)
Dept: RADIOLOGY | Facility: CLINIC | Age: 31
End: 2021-01-27
Payer: COMMERCIAL

## 2021-01-27 DIAGNOSIS — M25.50 PAIN IN UNSPECIFIED JOINT: ICD-10-CM

## 2021-01-27 PROCEDURE — 73120 X-RAY EXAM OF HAND: CPT | Mod: 26,50

## 2021-01-27 PROCEDURE — 72100 X-RAY EXAM L-S SPINE 2/3 VWS: CPT | Mod: 26

## 2021-01-27 PROCEDURE — 73030 X-RAY EXAM OF SHOULDER: CPT

## 2021-01-27 PROCEDURE — 73120 X-RAY EXAM OF HAND: CPT

## 2021-01-27 PROCEDURE — 72100 X-RAY EXAM L-S SPINE 2/3 VWS: CPT

## 2021-01-27 PROCEDURE — 73030 X-RAY EXAM OF SHOULDER: CPT | Mod: 26,50

## 2021-01-29 LAB
25(OH)D3 SERPL-MCNC: 32.3 NG/ML
ALBUMIN SERPL ELPH-MCNC: 4.5 G/DL
ALP BLD-CCNC: 74 U/L
ALT SERPL-CCNC: 18 U/L
ANA PAT FLD IF-IMP: ABNORMAL
ANA SER IF-ACNC: ABNORMAL
ANION GAP SERPL CALC-SCNC: 19 MMOL/L
APPEARANCE: CLEAR
AST SERPL-CCNC: 19 U/L
B BURGDOR AB SER-IMP: NEGATIVE
B BURGDOR IGM PATRN SER IB-IMP: NEGATIVE
B BURGDOR18KD IGG SER QL IB: NORMAL
B BURGDOR23KD IGG SER QL IB: NORMAL
B BURGDOR23KD IGM SER QL IB: NORMAL
B BURGDOR28KD IGG SER QL IB: NORMAL
B BURGDOR30KD IGG SER QL IB: NORMAL
B BURGDOR31KD IGG SER QL IB: NORMAL
B BURGDOR39KD IGG SER QL IB: NORMAL
B BURGDOR39KD IGM SER QL IB: NORMAL
B BURGDOR41KD IGG SER QL IB: PRESENT
B BURGDOR41KD IGM SER QL IB: NORMAL
B BURGDOR45KD IGG SER QL IB: NORMAL
B BURGDOR58KD IGG SER QL IB: NORMAL
B BURGDOR66KD IGG SER QL IB: NORMAL
B BURGDOR93KD IGG SER QL IB: NORMAL
BACTERIA: NEGATIVE
BAKER'S YEAST AB QL: 36.6 UNITS
BAKER'S YEAST IGA QL IA: 31.6 UNITS
BAKER'S YEAST IGA QN IA: ABNORMAL
BAKER'S YEAST IGG QN IA: POSITIVE
BASOPHILS # BLD AUTO: 0.1 K/UL
BASOPHILS NFR BLD AUTO: 1 %
BILIRUB SERPL-MCNC: <0.2 MG/DL
BILIRUBIN URINE: NEGATIVE
BLOOD URINE: NEGATIVE
BUN SERPL-MCNC: 10 MG/DL
C3 SERPL-MCNC: 175 MG/DL
C4 SERPL-MCNC: 28 MG/DL
CALCIUM SERPL-MCNC: 10 MG/DL
CCP AB SER IA-ACNC: <8 UNITS
CELIACPAN: NORMAL
CHLORIDE SERPL-SCNC: 98 MMOL/L
CHOLEST SERPL-MCNC: 237 MG/DL
CO2 SERPL-SCNC: 21 MMOL/L
COLOR: YELLOW
CREAT SERPL-MCNC: 0.95 MG/DL
CREAT SPEC-SCNC: 84 MG/DL
CREAT/PROT UR: 0.1 RATIO
CRP SERPL-MCNC: 0.45 MG/DL
DSDNA AB SER-ACNC: <12 IU/ML
ENA RNP AB SER IA-ACNC: <0.2 AL
ENA SM AB SER IA-ACNC: <0.2 AL
ENA SS-A AB SER IA-ACNC: <0.2 AL
ENA SS-B AB SER IA-ACNC: <0.2 AL
EOSINOPHIL # BLD AUTO: 0.2 K/UL
EOSINOPHIL NFR BLD AUTO: 2 %
ERYTHROCYTE [SEDIMENTATION RATE] IN BLOOD BY WESTERGREN METHOD: 20 MM/HR
ESTIMATED AVERAGE GLUCOSE: 120 MG/DL
GLUCOSE QUALITATIVE U: NEGATIVE
GLUCOSE SERPL-MCNC: 73 MG/DL
HBA1C MFR BLD HPLC: 5.8 %
HCT VFR BLD CALC: 46.2 %
HDLC SERPL-MCNC: 65 MG/DL
HGB BLD-MCNC: 14.5 G/DL
HYALINE CASTS: 0 /LPF
IMM GRANULOCYTES NFR BLD AUTO: 0.5 %
KETONES URINE: NEGATIVE
LDLC SERPL CALC-MCNC: NORMAL MG/DL
LEUKOCYTE ESTERASE URINE: NEGATIVE
LYMPHOCYTES # BLD AUTO: 2.6 K/UL
LYMPHOCYTES NFR BLD AUTO: 25.5 %
MAN DIFF?: NORMAL
MCHC RBC-ENTMCNC: 30.7 PG
MCHC RBC-ENTMCNC: 31.4 GM/DL
MCV RBC AUTO: 97.9 FL
MICROSCOPIC-UA: NORMAL
MONOCYTES # BLD AUTO: 0.79 K/UL
MONOCYTES NFR BLD AUTO: 7.8 %
NEUTROPHILS # BLD AUTO: 6.44 K/UL
NEUTROPHILS NFR BLD AUTO: 63.2 %
NITRITE URINE: NEGATIVE
NONHDLC SERPL-MCNC: 172 MG/DL
PH URINE: 6
PLATELET # BLD AUTO: 465 K/UL
POTASSIUM SERPL-SCNC: 3.9 MMOL/L
PROT SERPL-MCNC: 8.3 G/DL
PROT UR-MCNC: 5 MG/DL
PROTEIN URINE: NEGATIVE
RBC # BLD: 4.72 M/UL
RBC # FLD: 12.9 %
RED BLOOD CELLS URINE: 6 /HPF
RF+CCP IGG SER-IMP: NEGATIVE
RHEUMATOID FACT SER QL: 10 IU/ML
SODIUM SERPL-SCNC: 137 MMOL/L
SPECIFIC GRAVITY URINE: 1.02
SQUAMOUS EPITHELIAL CELLS: 0 /HPF
THYROGLOB AB SERPL-ACNC: <20 IU/ML
THYROPEROXIDASE AB SERPL IA-ACNC: 26.9 IU/ML
TRIGL SERPL-MCNC: 477 MG/DL
TSH SERPL-ACNC: 1.56 UIU/ML
UROBILINOGEN URINE: NORMAL
WBC # FLD AUTO: 10.18 K/UL
WHITE BLOOD CELLS URINE: 0 /HPF

## 2021-02-04 ENCOUNTER — RESULT REVIEW (OUTPATIENT)
Age: 31
End: 2021-02-04

## 2021-02-04 ENCOUNTER — OUTPATIENT (OUTPATIENT)
Dept: OUTPATIENT SERVICES | Facility: HOSPITAL | Age: 31
LOS: 1 days | End: 2021-02-04
Payer: COMMERCIAL

## 2021-02-04 ENCOUNTER — APPOINTMENT (OUTPATIENT)
Dept: ULTRASOUND IMAGING | Facility: CLINIC | Age: 31
End: 2021-02-04
Payer: COMMERCIAL

## 2021-02-04 DIAGNOSIS — M25.50 PAIN IN UNSPECIFIED JOINT: ICD-10-CM

## 2021-02-04 PROCEDURE — 76882 US LMTD JT/FCL EVL NVASC XTR: CPT

## 2021-02-04 PROCEDURE — 76882 US LMTD JT/FCL EVL NVASC XTR: CPT | Mod: 26,59,LT,76

## 2021-02-12 ENCOUNTER — APPOINTMENT (OUTPATIENT)
Dept: RHEUMATOLOGY | Facility: CLINIC | Age: 31
End: 2021-02-12
Payer: COMMERCIAL

## 2021-02-12 VITALS
HEIGHT: 65 IN | WEIGHT: 180 LBS | BODY MASS INDEX: 29.99 KG/M2 | SYSTOLIC BLOOD PRESSURE: 131 MMHG | TEMPERATURE: 97.1 F | RESPIRATION RATE: 17 BRPM | HEART RATE: 81 BPM | DIASTOLIC BLOOD PRESSURE: 89 MMHG | OXYGEN SATURATION: 97 %

## 2021-02-12 DIAGNOSIS — M25.50 PAIN IN UNSPECIFIED JOINT: ICD-10-CM

## 2021-02-12 PROCEDURE — 99214 OFFICE O/P EST MOD 30 MIN: CPT

## 2021-02-12 PROCEDURE — 99072 ADDL SUPL MATRL&STAF TM PHE: CPT

## 2021-02-24 PROBLEM — M25.50 POLYARTHRALGIA: Status: ACTIVE | Noted: 2021-01-21

## 2021-03-02 ENCOUNTER — APPOINTMENT (OUTPATIENT)
Dept: GASTROENTEROLOGY | Facility: CLINIC | Age: 31
End: 2021-03-02
Payer: COMMERCIAL

## 2021-03-02 VITALS
HEIGHT: 65 IN | WEIGHT: 185 LBS | SYSTOLIC BLOOD PRESSURE: 140 MMHG | BODY MASS INDEX: 30.82 KG/M2 | DIASTOLIC BLOOD PRESSURE: 90 MMHG | HEART RATE: 85 BPM | OXYGEN SATURATION: 99 %

## 2021-03-02 DIAGNOSIS — K21.9 GASTRO-ESOPHAGEAL REFLUX DISEASE W/OUT ESOPHAGITIS: ICD-10-CM

## 2021-03-02 PROCEDURE — 99204 OFFICE O/P NEW MOD 45 MIN: CPT

## 2021-03-02 PROCEDURE — 99072 ADDL SUPL MATRL&STAF TM PHE: CPT

## 2021-03-09 ENCOUNTER — TRANSCRIPTION ENCOUNTER (OUTPATIENT)
Age: 31
End: 2021-03-09

## 2021-03-16 ENCOUNTER — TRANSCRIPTION ENCOUNTER (OUTPATIENT)
Age: 31
End: 2021-03-16

## 2021-03-16 LAB — DEPRECATED O AND P PREP STL: ABNORMAL

## 2021-03-20 DIAGNOSIS — Z01.818 ENCOUNTER FOR OTHER PREPROCEDURAL EXAMINATION: ICD-10-CM

## 2021-03-22 ENCOUNTER — APPOINTMENT (OUTPATIENT)
Dept: DISASTER EMERGENCY | Facility: CLINIC | Age: 31
End: 2021-03-22

## 2021-03-23 LAB — SARS-COV-2 N GENE NPH QL NAA+PROBE: NOT DETECTED

## 2021-03-25 ENCOUNTER — APPOINTMENT (OUTPATIENT)
Dept: GASTROENTEROLOGY | Facility: CLINIC | Age: 31
End: 2021-03-25
Payer: COMMERCIAL

## 2021-03-25 ENCOUNTER — LABORATORY RESULT (OUTPATIENT)
Age: 31
End: 2021-03-25

## 2021-03-25 LAB
C DIFF TOX GENS STL QL NAA+PROBE: NORMAL
CDIFF BY PCR: NOT DETECTED
FAT STL QN: NORMAL
FAT STL QN: NORMAL
GI PCR PANEL, STOOL: NORMAL
WRIGHT STN STL: NEGATIVE

## 2021-03-25 PROCEDURE — 99072 ADDL SUPL MATRL&STAF TM PHE: CPT

## 2021-03-25 PROCEDURE — 81025 URINE PREGNANCY TEST: CPT

## 2021-03-25 PROCEDURE — 43239 EGD BIOPSY SINGLE/MULTIPLE: CPT

## 2021-03-25 PROCEDURE — 45380 COLONOSCOPY AND BIOPSY: CPT

## 2021-03-29 ENCOUNTER — TRANSCRIPTION ENCOUNTER (OUTPATIENT)
Age: 31
End: 2021-03-29

## 2021-03-30 ENCOUNTER — TRANSCRIPTION ENCOUNTER (OUTPATIENT)
Age: 31
End: 2021-03-30

## 2021-03-31 ENCOUNTER — TRANSCRIPTION ENCOUNTER (OUTPATIENT)
Age: 31
End: 2021-03-31

## 2021-04-06 ENCOUNTER — NON-APPOINTMENT (OUTPATIENT)
Age: 31
End: 2021-04-06

## 2021-04-07 ENCOUNTER — APPOINTMENT (OUTPATIENT)
Dept: OBGYN | Facility: CLINIC | Age: 31
End: 2021-04-07
Payer: COMMERCIAL

## 2021-04-07 VITALS
BODY MASS INDEX: 30.49 KG/M2 | DIASTOLIC BLOOD PRESSURE: 78 MMHG | WEIGHT: 183 LBS | SYSTOLIC BLOOD PRESSURE: 120 MMHG | OXYGEN SATURATION: 98 % | HEART RATE: 94 BPM | HEIGHT: 65 IN | TEMPERATURE: 98.2 F

## 2021-04-07 DIAGNOSIS — Z01.419 ENCOUNTER FOR GYNECOLOGICAL EXAMINATION (GENERAL) (ROUTINE) W/OUT ABNORMAL FINDINGS: ICD-10-CM

## 2021-04-07 DIAGNOSIS — E27.8 OTHER SPECIFIED DISORDERS OF ADRENAL GLAND: ICD-10-CM

## 2021-04-07 LAB
CALPROTECTIN FECAL: 36 UG/G
HCG UR QL: NEGATIVE
QUALITY CONTROL: YES

## 2021-04-07 PROCEDURE — 99395 PREV VISIT EST AGE 18-39: CPT

## 2021-04-07 PROCEDURE — 99072 ADDL SUPL MATRL&STAF TM PHE: CPT

## 2021-04-07 RX ORDER — CETIRIZINE HCL 10 MG
10 TABLET ORAL DAILY
Refills: 0 | Status: DISCONTINUED | COMMUNITY
End: 2021-04-07

## 2021-04-07 RX ORDER — ZOLPIDEM TARTRATE 5 MG/1
5 TABLET ORAL
Qty: 30 | Refills: 0 | Status: DISCONTINUED | COMMUNITY
Start: 2020-03-23 | End: 2021-04-07

## 2021-04-07 RX ORDER — FLUCONAZOLE 150 MG/1
150 TABLET ORAL
Qty: 2 | Refills: 0 | Status: DISCONTINUED | COMMUNITY
Start: 2020-12-13 | End: 2021-04-07

## 2021-04-07 NOTE — HISTORY OF PRESENT ILLNESS
[TextBox_4] : dad had BKA from motorcycle acc--prosthetic and doing well. Mom dxed with bile [PapSmeardate] : 2020 [ColonoscopyDate] : 2021 [TextBox_78] : received Gardasil

## 2021-04-08 ENCOUNTER — TRANSCRIPTION ENCOUNTER (OUTPATIENT)
Age: 31
End: 2021-04-08

## 2021-04-10 LAB — CYTOLOGY CVX/VAG DOC THIN PREP: NORMAL

## 2021-04-15 ENCOUNTER — TRANSCRIPTION ENCOUNTER (OUTPATIENT)
Age: 31
End: 2021-04-15

## 2021-04-16 ENCOUNTER — TRANSCRIPTION ENCOUNTER (OUTPATIENT)
Age: 31
End: 2021-04-16

## 2021-04-29 ENCOUNTER — TRANSCRIPTION ENCOUNTER (OUTPATIENT)
Age: 31
End: 2021-04-29

## 2021-05-07 ENCOUNTER — APPOINTMENT (OUTPATIENT)
Dept: RHEUMATOLOGY | Facility: CLINIC | Age: 31
End: 2021-05-07

## 2021-05-08 ENCOUNTER — TRANSCRIPTION ENCOUNTER (OUTPATIENT)
Age: 31
End: 2021-05-08

## 2021-05-08 DIAGNOSIS — M19.90 UNSPECIFIED OSTEOARTHRITIS, UNSPECIFIED SITE: ICD-10-CM

## 2021-05-13 ENCOUNTER — TRANSCRIPTION ENCOUNTER (OUTPATIENT)
Age: 31
End: 2021-05-13

## 2021-06-03 ENCOUNTER — APPOINTMENT (OUTPATIENT)
Dept: RHEUMATOLOGY | Facility: CLINIC | Age: 31
End: 2021-06-03
Payer: COMMERCIAL

## 2021-06-03 VITALS
WEIGHT: 182 LBS | TEMPERATURE: 97.6 F | SYSTOLIC BLOOD PRESSURE: 125 MMHG | HEART RATE: 91 BPM | BODY MASS INDEX: 30.32 KG/M2 | DIASTOLIC BLOOD PRESSURE: 87 MMHG | HEIGHT: 65 IN | OXYGEN SATURATION: 97 %

## 2021-06-03 DIAGNOSIS — L40.50 ARTHROPATHIC PSORIASIS, UNSPECIFIED: ICD-10-CM

## 2021-06-03 PROCEDURE — 99072 ADDL SUPL MATRL&STAF TM PHE: CPT

## 2021-06-03 PROCEDURE — 99214 OFFICE O/P EST MOD 30 MIN: CPT

## 2021-06-07 LAB
ALBUMIN SERPL ELPH-MCNC: 4.2 G/DL
ALP BLD-CCNC: 62 U/L
ALT SERPL-CCNC: 17 U/L
ANION GAP SERPL CALC-SCNC: 13 MMOL/L
AST SERPL-CCNC: 23 U/L
BASOPHILS # BLD AUTO: 0.09 K/UL
BASOPHILS NFR BLD AUTO: 1 %
BILIRUB SERPL-MCNC: 0.2 MG/DL
BUN SERPL-MCNC: 8 MG/DL
CALCIUM SERPL-MCNC: 9.3 MG/DL
CHLORIDE SERPL-SCNC: 102 MMOL/L
CHOLEST SERPL-MCNC: 213 MG/DL
CO2 SERPL-SCNC: 25 MMOL/L
COVID-19 NUCLEOCAPSID  GAM ANTIBODY INTERPRETATION: NEGATIVE
COVID-19 SPIKE DOMAIN ANTIBODY INTERPRETATION: POSITIVE
CREAT SERPL-MCNC: 0.62 MG/DL
CRP SERPL-MCNC: 7 MG/L
DEPRECATED KAPPA LC FREE/LAMBDA SER: 1.1 RATIO
EOSINOPHIL # BLD AUTO: 0.22 K/UL
EOSINOPHIL NFR BLD AUTO: 2.4 %
ERYTHROCYTE [SEDIMENTATION RATE] IN BLOOD BY WESTERGREN METHOD: 15 MM/HR
ESTIMATED AVERAGE GLUCOSE: 120 MG/DL
GLUCOSE SERPL-MCNC: 77 MG/DL
HBA1C MFR BLD HPLC: 5.8 %
HBV CORE IGG+IGM SER QL: NONREACTIVE
HBV SURFACE AB SER QL: REACTIVE
HBV SURFACE AG SER QL: NONREACTIVE
HCT VFR BLD CALC: 43.7 %
HCV AB SER QL: NONREACTIVE
HCV S/CO RATIO: 0.23 S/CO
HDLC SERPL-MCNC: 55 MG/DL
HGB BLD-MCNC: 13.5 G/DL
IGA SER QL IEP: 205 MG/DL
IGG SER QL IEP: 1256 MG/DL
IGM SER QL IEP: 189 MG/DL
IMM GRANULOCYTES NFR BLD AUTO: 0.3 %
KAPPA LC CSF-MCNC: 1 MG/DL
KAPPA LC SERPL-MCNC: 1.1 MG/DL
LDLC SERPL CALC-MCNC: NORMAL MG/DL
LYMPHOCYTES # BLD AUTO: 1.75 K/UL
LYMPHOCYTES NFR BLD AUTO: 18.9 %
M TB IFN-G BLD-IMP: NEGATIVE
MAN DIFF?: NORMAL
MCHC RBC-ENTMCNC: 29.9 PG
MCHC RBC-ENTMCNC: 30.9 GM/DL
MCV RBC AUTO: 96.9 FL
MONOCYTES # BLD AUTO: 0.6 K/UL
MONOCYTES NFR BLD AUTO: 6.5 %
NEUTROPHILS # BLD AUTO: 6.59 K/UL
NEUTROPHILS NFR BLD AUTO: 70.9 %
NONHDLC SERPL-MCNC: 158 MG/DL
PLATELET # BLD AUTO: 381 K/UL
POTASSIUM SERPL-SCNC: 4.2 MMOL/L
PROT SERPL-MCNC: 7.3 G/DL
QUANTIFERON TB PLUS MITOGEN MINUS NIL: 7.36 IU/ML
QUANTIFERON TB PLUS NIL: 0.02 IU/ML
QUANTIFERON TB PLUS TB1 MINUS NIL: -0.01 IU/ML
QUANTIFERON TB PLUS TB2 MINUS NIL: -0.01 IU/ML
RBC # BLD: 4.51 M/UL
RBC # FLD: 12.8 %
SARS-COV-2 AB SERPL IA-ACNC: >250 U/ML
SARS-COV-2 AB SERPL QL IA: 0.08 INDEX
SODIUM SERPL-SCNC: 140 MMOL/L
TRIGL SERPL-MCNC: 433 MG/DL
WBC # FLD AUTO: 9.28 K/UL

## 2021-06-14 ENCOUNTER — TRANSCRIPTION ENCOUNTER (OUTPATIENT)
Age: 31
End: 2021-06-14

## 2021-07-14 ENCOUNTER — APPOINTMENT (OUTPATIENT)
Dept: FAMILY MEDICINE | Facility: CLINIC | Age: 31
End: 2021-07-14
Payer: COMMERCIAL

## 2021-07-14 VITALS
TEMPERATURE: 97.4 F | WEIGHT: 180 LBS | RESPIRATION RATE: 16 BRPM | DIASTOLIC BLOOD PRESSURE: 88 MMHG | HEART RATE: 89 BPM | BODY MASS INDEX: 29.99 KG/M2 | SYSTOLIC BLOOD PRESSURE: 111 MMHG | HEIGHT: 65 IN

## 2021-07-14 DIAGNOSIS — Z78.9 OTHER SPECIFIED HEALTH STATUS: ICD-10-CM

## 2021-07-14 DIAGNOSIS — Z00.00 ENCOUNTER FOR GENERAL ADULT MEDICAL EXAMINATION W/OUT ABNORMAL FINDINGS: ICD-10-CM

## 2021-07-14 DIAGNOSIS — Z11.4 ENCOUNTER FOR SCREENING FOR HUMAN IMMUNODEFICIENCY VIRUS [HIV]: ICD-10-CM

## 2021-07-14 PROCEDURE — 99395 PREV VISIT EST AGE 18-39: CPT | Mod: 25

## 2021-07-14 PROCEDURE — 99203 OFFICE O/P NEW LOW 30 MIN: CPT | Mod: 25

## 2021-07-14 PROCEDURE — 36415 COLL VENOUS BLD VENIPUNCTURE: CPT

## 2021-07-14 PROCEDURE — 99213 OFFICE O/P EST LOW 20 MIN: CPT | Mod: 25

## 2021-07-14 PROCEDURE — 99072 ADDL SUPL MATRL&STAF TM PHE: CPT

## 2021-07-14 PROCEDURE — 99385 PREV VISIT NEW AGE 18-39: CPT | Mod: 25

## 2021-07-14 RX ORDER — METHOTREXATE 2.5 MG/1
2.5 TABLET ORAL
Qty: 30 | Refills: 0 | Status: DISCONTINUED | COMMUNITY
Start: 2021-04-29 | End: 2021-07-14

## 2021-07-14 RX ORDER — SULFASALAZINE 500 MG/1
500 TABLET ORAL
Qty: 60 | Refills: 3 | Status: DISCONTINUED | COMMUNITY
Start: 2021-03-31 | End: 2021-07-14

## 2021-07-14 RX ORDER — ALPRAZOLAM 0.5 MG/1
0.5 TABLET ORAL
Refills: 0 | Status: ACTIVE | COMMUNITY

## 2021-07-14 NOTE — PHYSICAL EXAM
[No Acute Distress] : no acute distress [Well Nourished] : well nourished [Well Developed] : well developed [Well-Appearing] : well-appearing [Normal Voice/Communication] : normal voice/communication [Normal Sclera/Conjunctiva] : normal sclera/conjunctiva [PERRL] : pupils equal round and reactive to light [EOMI] : extraocular movements intact [Normal Outer Ear/Nose] : the outer ears and nose were normal in appearance [Normal Oropharynx] : the oropharynx was normal [No Lymphadenopathy] : no lymphadenopathy [Supple] : supple [Thyroid Normal, No Nodules] : the thyroid was normal and there were no nodules present [No Respiratory Distress] : no respiratory distress  [No Accessory Muscle Use] : no accessory muscle use [Clear to Auscultation] : lungs were clear to auscultation bilaterally [Normal Rate] : normal rate  [Regular Rhythm] : with a regular rhythm [Normal S1, S2] : normal S1 and S2 [No Murmur] : no murmur heard [No Carotid Bruits] : no carotid bruits [No Abdominal Bruit] : a ~M bruit was not heard ~T in the abdomen [No Varicosities] : no varicosities [Pedal Pulses Present] : the pedal pulses are present [No Edema] : there was no peripheral edema [No Palpable Aorta] : no palpable aorta [No Extremity Clubbing/Cyanosis] : no extremity clubbing/cyanosis [Soft] : abdomen soft [Non Tender] : non-tender [Non-distended] : non-distended [No Masses] : no abdominal mass palpated [No HSM] : no HSM [Normal Bowel Sounds] : normal bowel sounds [Normal Supraclavicular Nodes] : no supraclavicular lymphadenopathy [Normal Posterior Cervical Nodes] : no posterior cervical lymphadenopathy [Normal Anterior Cervical Nodes] : no anterior cervical lymphadenopathy [No CVA Tenderness] : no CVA  tenderness [No Spinal Tenderness] : no spinal tenderness [No Joint Swelling] : no joint swelling [Grossly Normal Strength/Tone] : grossly normal strength/tone [No Rash] : no rash [Coordination Grossly Intact] : coordination grossly intact [No Focal Deficits] : no focal deficits [Normal Gait] : normal gait [Cranial Nerves Oculomotor (III)] : the extraocular motions were intact [Cranial Nerves Trigeminal (V)] : sensation to the face and masseter strength were intact [Cranial Nerves Facial (VII)] : facial strength was intact bilaterally [Cranial Nerves Accessory (XI - Cranial And Spinal)] : shoulder shrug was intact bilaterally [Cranial Nerves Hypoglossal (XII)] : there was no tongue deviation with protrusion [Sensation Tactile Decrease] : light touch was intact [2+] : left 2+ [Speech Grossly Normal] : speech grossly normal [Normal Affect] : the affect was normal [Normal Mood] : the mood was normal [Normal Insight/Judgement] : insight and judgment were intact

## 2021-07-14 NOTE — ASSESSMENT
[FreeTextEntry1] : \par In regards annual physical exam: \par Received Tdap vaccine a few years ago, will need records\par Following up with OB/Gyn for her PAP smear, last one normal in chart: 01/2020\par AUDIT-C test done, negative as noted above\par Advised to see optometrist once at year and dentist every 6 months \par Patient immune to Hep B as per titers from 06/2021. Advised to bring her Tdap records. 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 .\par CBC, CMP from 06/2021 unremarkable\par \par Regarding advanced care planning: I discussed with this patient the different options and importance of advance care planning, including but not limited to health care proxy designation. Please refer to advance care planning section of this note for detailed information \par \par Pre-DM\par Last HbA1C: 5.8% on 06/2021\par Lifestyle modifications discussed\par Could be related to her PCOS\par Would repeat HbA1C in 5 months\par \par HLD and overweight\par Lipid panel from 06/2021: high TG and chol\par Will start fish oil and will repeat lipid panel on 09/2021\par Lifestyle modifications discussed\par \par Depression and anxiety\par Stable\par On Cymbalta, prescribed by her rheum for FM\par On Xanax as needed by her psychiatrist\par \par PCOS\par managed by Gyn\par On spironolactone and OCPs\par Patient requests potassium levels to be check as per her dermatologist and would like to have them added to today's blood work. \par \par Return to care: within 1 year for CPE or earlier if needed\par Call or return for any questions

## 2021-07-14 NOTE — HEALTH RISK ASSESSMENT
[Yes] : Yes [2 - 3 times a week (3 pts)] : 2 - 3  times a week (3 points) [1 or 2 (0 pts)] : 1 or 2 (0 points) [Never (0 pts)] : Never (0 points) [No] : In the past 12 months have you used drugs other than those required for medical reasons? 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

## 2021-07-14 NOTE — HISTORY OF PRESENT ILLNESS
[FreeTextEntry1] : CPE [de-identified] : Ms. COURTNEY DEY is a pleasant 31 year old female with PMH of asthma, last crisis 2 years ago, never intubated, PCOS, anxiety and depression on Xanax QHS and FM or RA? following up with rheum, pre-DM, HLD not on medications who comes in to the office for a CPE. No complaints today\par \par Psych: Chimma\par Rheum: Dr Wyatt and Dr Mendez\par Derm: Mathew Fuentes\par Gyn: Annabel Sahu\par Allergist: DA Guallpa in [facility]

## 2021-07-15 LAB
HIV1+2 AB SPEC QL IA.RAPID: NONREACTIVE
MEV IGG FLD QL IA: 148 AU/ML
MEV IGG+IGM SER-IMP: POSITIVE
MUV AB SER-ACNC: POSITIVE
MUV IGG SER QL IA: 14.2 AU/ML
POTASSIUM SERPL-SCNC: 4.1 MMOL/L
RUBV IGG FLD-ACNC: 2.6 INDEX
RUBV IGG SER-IMP: POSITIVE
VZV AB TITR SER: POSITIVE
VZV IGG SER IF-ACNC: 2131 INDEX

## 2021-08-03 ENCOUNTER — RX RENEWAL (OUTPATIENT)
Age: 31
End: 2021-08-03

## 2021-08-05 ENCOUNTER — APPOINTMENT (OUTPATIENT)
Dept: RHEUMATOLOGY | Facility: CLINIC | Age: 31
End: 2021-08-05

## 2021-08-11 ENCOUNTER — TRANSCRIPTION ENCOUNTER (OUTPATIENT)
Age: 31
End: 2021-08-11

## 2021-08-12 ENCOUNTER — APPOINTMENT (OUTPATIENT)
Dept: DISASTER EMERGENCY | Facility: HOSPITAL | Age: 31
End: 2021-08-12

## 2021-08-12 ENCOUNTER — OUTPATIENT (OUTPATIENT)
Dept: INPATIENT UNIT | Facility: HOSPITAL | Age: 31
LOS: 1 days | End: 2021-08-12

## 2021-08-12 VITALS
OXYGEN SATURATION: 97 % | SYSTOLIC BLOOD PRESSURE: 121 MMHG | RESPIRATION RATE: 16 BRPM | TEMPERATURE: 98 F | DIASTOLIC BLOOD PRESSURE: 88 MMHG | HEART RATE: 87 BPM

## 2021-08-12 VITALS
OXYGEN SATURATION: 98 % | RESPIRATION RATE: 17 BRPM | HEART RATE: 86 BPM | SYSTOLIC BLOOD PRESSURE: 112 MMHG | TEMPERATURE: 99 F | DIASTOLIC BLOOD PRESSURE: 68 MMHG

## 2021-08-12 DIAGNOSIS — U07.1 COVID-19: ICD-10-CM

## 2021-08-12 RX ORDER — SODIUM CHLORIDE 9 MG/ML
250 INJECTION INTRAMUSCULAR; INTRAVENOUS; SUBCUTANEOUS
Refills: 0 | Status: DISCONTINUED | OUTPATIENT
Start: 2021-08-12 | End: 2021-08-27

## 2021-08-12 NOTE — CHART NOTE - NSCHARTNOTEFT_GEN_A_CORE
NP Chart note    CC: Monoclonal Antibody Infusion /COVID 19 Positive    30 y/o female with PMH significant forAsthma, PCOS, who is COVID 19+, presents today for monoclonal antibody infusion   (Casirivimab / Imdevimab).     Symptoms/ Criteria: Patient symptomatic with SOB on exertion, sore  throat, runny nose, diarrhea  Inclusion Criteria: BMI> 25, asthma  Date of Symptom Onset: 08/06/2021  Date of Positive COVID 19 : 08/10/2021   Covid vaccination: Pfizerx 2doses ( 01/07/2021)    Exam/Findings  VSS, please see flow sheet    PE:   Appearance: Obese, Appears pleasant  HEENT:  NC/AT, anicteric  Cardiovascular: Normal S1 S2, No JVD, No murmurs, No edema  Respiratory: B/L lungs CTA	  Gastrointestinal:  Obese, Soft, Non-tender, + BS	  Skin: warm and dry, no rash or urticaria   Neurologic: Non-focal  Extremities: Normal range of motion    ASSESSMENT:    30 y/o female with PMH significant forAsthma, PCOS, who is COVID 19+, presents today for monoclonal antibody infusion   (Casirivimab / Imdevimab).     With reference to her pregnancy screening, Risks of MAB infusion associated with pregnancy explained to the patient. pregnancy test offered to the patient. Patient declined pregnancy test and verbalized understanding of risks of MAB associated with pregnancy. Pregnancy waiver signed by patient and consent placed in the chart.    PLAN:  - infusion procedure explained to patient.  I have reviewed the Casirivimab/Imdevimab Emergency Use Authorization (EUA) and I have provided the patient or patient's caregiver with the following information:  1. FDA has authorized emergency use Casirivimab/Imdevimab, which is not an FDA-approved biological product.  2. The patient or patient's caregiver has the option to accept or refuse administration of Casirivimab/Imdevimab.   3. The significant known and potential risks and benefits of Casirivimab/Imdevimab and the extent to which such risks and benefits are unknown.  4. Information on available alternative treatments and risks and benefits of those alternatives.  - Consent for monoclonal antibody infusion obtained.   - Risk & benefits discussed/all questions answered.  - infuse Casirivimab/Imdevimab (1200mg/1200mg) IV over one hour.  - observe patient for one hour post infusion, and then if stable discharge home with oupt follow up as planned by PMD.    POST INFUSION ASSESSMENT:     Vital Signs Last 24 Hrs  T(C): 37.2 (12 Aug 2021 15:30), Max: 37.2 (12 Aug 2021 15:30)  T(F): 99 (12 Aug 2021 15:30), Max: 99 (12 Aug 2021 15:30)  HR: 86 (12 Aug 2021 15:30) (86 - 88)  BP: 112/68 (12 Aug 2021 15:30) (109/72 - 121/88)  BP(mean): --  RR: 17 (12 Aug 2021 15:30) (16 - 17)  SpO2: 98% (12 Aug 2021 15:30) (97% - 98%)    DISCHARGE at 15:30 PM  - Patient tolerated infusion well denies complaints of chest pain/SOB/dizziness/ palpitations.   - VSS for discharge home.  - D/C instructions given/ fact sheet included.  - Patient to follow-up with PCP as needed.    Mika Torrez FNP-BC, AGANCP-BC.

## 2021-08-13 ENCOUNTER — TRANSCRIPTION ENCOUNTER (OUTPATIENT)
Age: 31
End: 2021-08-13

## 2021-08-13 ENCOUNTER — APPOINTMENT (OUTPATIENT)
Dept: FAMILY MEDICINE | Facility: CLINIC | Age: 31
End: 2021-08-13
Payer: COMMERCIAL

## 2021-08-13 DIAGNOSIS — Z02.0 ENCOUNTER FOR EXAMINATION FOR ADMISSION TO EDUCATIONAL INSTITUTION: ICD-10-CM

## 2021-08-13 PROCEDURE — 99443: CPT

## 2021-08-13 NOTE — HISTORY OF PRESENT ILLNESS
[Home] : at home, [unfilled] , at the time of the visit. [Medical Office: ([facility])___] : at the medical office located in  [Verbal consent obtained from patient] : the patient, [unfilled] [FreeTextEntry8] : Ms. COURTNEY DEY is a pleasant 31 year old female with PMH of asthma, last crisis 2 years ago, never intubated, PCOS, anxiety and depression on Xanax QHS and FM or RA? following up with rheum, pre-DM, HLD not on medications who is being seen via telephone visit. Patient tested positive for COVID-19 on 08/09/2021 and and her symptoms started on 08/06/2021 got her antibody infusion on 08/14/2021. She has been using her albuterol, still has cough, postnasal drip and runny nose. Patient reports that is getting better but still has some shortness of breath. Her O2 Sat has been consistently >94%. She has not had fever and no more diarrhea.\par Of note patient just came back from [facility]\par Patient also complaints of chronic sinusitis and currently has frontal sinus pressure. Denies fever. \par Patient is COVID-19 vaccinated. She has been taking Tylenol as tolerated and multivitamins. \par \par Psych: Chimma\par Rheum: Dr yWatt and Dr Mendez\par Derm: Mathew Fuentes\par Gyn: Annabel Sahu\par Allergist: DA Guallpa in [facility] \par

## 2021-08-13 NOTE — PLAN
[FreeTextEntry1] : \par In regards to asthma acute 2/2 upper respiratory infection likely 2/2 COVID-19. Patient denies symptoms of respiratory distress at this time. Patient is speaking in full sentences, reports that the O2 Sat is consistently < 94%\par Patient has asthma. She just got the antibody infusion therapy\par Renewing her albuterol inhaler\par Sending a Medrol-pack\par I advised the patient to take ibuprofen/acetaminophen for pain and or fever, to increase the oral intake to 2-3 L/day unless the patient has a contraindication to do so. Also to monitor for worsening symptoms including but not limited to uncontrolled fever, shortness of breath, O2 sat  < 95% worsening sore throat, weakness, being unable to tolerate the oral intake and to go to the Emergency department if the symptoms worsen. \par Advised to social distance, wear a mask and continue to follow up CDC guidelines for infectious diseases including but not limited to COVID-19 \par \par Sinusitis\par Patient has history of chronic sinusitis\par Reports that Augmentin has given her diarrhea in the past. Will try amoxicillin instead and I advise her to take a probiotic. \par To take ibuprofen/Tylenol as needed\par Increase her oral hydration as noted above\par \par Patient requests a drug screen for school.\par Placing order, patient to go to the laboratory\par \par Return to care: as needed\par Call or return for any questions

## 2021-08-14 ENCOUNTER — TRANSCRIPTION ENCOUNTER (OUTPATIENT)
Age: 31
End: 2021-08-14

## 2021-08-18 ENCOUNTER — APPOINTMENT (OUTPATIENT)
Dept: PULMONOLOGY | Facility: CLINIC | Age: 31
End: 2021-08-18
Payer: COMMERCIAL

## 2021-08-18 DIAGNOSIS — R06.00 DYSPNEA, UNSPECIFIED: ICD-10-CM

## 2021-08-18 PROCEDURE — 99442: CPT

## 2021-08-18 RX ORDER — DULOXETINE HYDROCHLORIDE 30 MG/1
30 CAPSULE, DELAYED RELEASE PELLETS ORAL
Qty: 30 | Refills: 0 | Status: DISCONTINUED | COMMUNITY
Start: 2021-08-11 | End: 2021-08-18

## 2021-08-18 RX ORDER — FOLIC ACID 1 MG/1
1 TABLET ORAL DAILY
Qty: 90 | Refills: 0 | Status: DISCONTINUED | COMMUNITY
Start: 2021-04-29 | End: 2021-08-18

## 2021-08-24 ENCOUNTER — APPOINTMENT (OUTPATIENT)
Dept: PULMONOLOGY | Facility: CLINIC | Age: 31
End: 2021-08-24

## 2021-08-27 ENCOUNTER — APPOINTMENT (OUTPATIENT)
Dept: PULMONOLOGY | Facility: CLINIC | Age: 31
End: 2021-08-27
Payer: COMMERCIAL

## 2021-08-27 VITALS
SYSTOLIC BLOOD PRESSURE: 125 MMHG | HEART RATE: 113 BPM | BODY MASS INDEX: 29.16 KG/M2 | HEIGHT: 65 IN | WEIGHT: 175 LBS | DIASTOLIC BLOOD PRESSURE: 80 MMHG | TEMPERATURE: 98 F | OXYGEN SATURATION: 99 % | RESPIRATION RATE: 16 BRPM

## 2021-08-27 DIAGNOSIS — U07.1 COVID-19: ICD-10-CM

## 2021-08-27 PROCEDURE — 99215 OFFICE O/P EST HI 40 MIN: CPT

## 2021-08-27 RX ORDER — BUDESONIDE AND FORMOTEROL FUMARATE DIHYDRATE 160; 4.5 UG/1; UG/1
160-4.5 AEROSOL RESPIRATORY (INHALATION) TWICE DAILY
Qty: 1 | Refills: 5 | Status: ACTIVE | COMMUNITY
Start: 2021-08-27 | End: 1900-01-01

## 2021-09-16 NOTE — PHYSICAL EXAM
[General Appearance - Well Developed] : well developed [Normal Appearance] : normal appearance [General Appearance - In No Acute Distress] : no acute distress [Normal Conjunctiva] : the conjunctiva exhibited no abnormalities [Low Lying Soft Palate] : low lying soft palate [Elongated Uvula] : elongated uvula [Enlarged Base of the Tongue] : enlargement of the base of the tongue [III] : III [Neck Appearance] : the appearance of the neck was normal [Heart Rate And Rhythm] : heart rate and rhythm were normal [Heart Sounds] : normal S1 and S2 [Murmurs] : no murmurs present [Edema] : no peripheral edema present [] : no respiratory distress [Respiration, Rhythm And Depth] : normal respiratory rhythm and effort [Exaggerated Use Of Accessory Muscles For Inspiration] : no accessory muscle use [Auscultation Breath Sounds / Voice Sounds] : lungs were clear to auscultation bilaterally [Abdomen Soft] : soft [Abdomen Tenderness] : non-tender [Abnormal Walk] : normal gait [Cyanosis, Localized] : no localized cyanosis [Nail Clubbing] : no clubbing of the fingernails [No Focal Deficits] : no focal deficits [Oriented To Time, Place, And Person] : oriented to person, place, and time [FreeTextEntry1] : No abnormalities.

## 2021-09-16 NOTE — CONSULT LETTER
[Dear  ___] : Dear  [unfilled], [Consult Letter:] : I had the pleasure of evaluating your patient, [unfilled]. [Please see my note below.] : Please see my note below. [Consult Closing:] : Thank you very much for allowing me to participate in the care of this patient.  If you have any questions, please do not hesitate to contact me. [Sincerely,] : Sincerely, [FreeTextEntry3] : Augustus Vuong MD, FCCP, D. ABSM\par Pulmonary and Sleep Medicine\par [facility] [place]\par

## 2021-09-16 NOTE — REASON FOR VISIT
[Follow-Up] : a follow-up visit [Asthma] : asthma [Shortness of Breath] : shortness of breath [TextBox_44] : weight issues, covid infection

## 2021-09-16 NOTE — HISTORY OF PRESENT ILLNESS
[Follow-Up - Routine Clinic] : a routine clinic follow-up of [Mild Persistent] : mild persistent [Dyspnea on Exertion] : dyspnea on exertion [Productive Cough] : productive cough [Currently Experiencing] : The patient is currently experiencing symptoms. [None] : No associated symptoms are reported [Inhaled Short-Acting Beta-2 Agonists] : inhaled short-acting beta-2 agonists [Inhaled Long-Acting Beta-2 Agonists] : inhaled long-acting beta-2 agonists [Inhaled Corticosteroids] : inhaled corticosteroids [Leukotriene Modifiers] : leukotriene modifiers [Good Compliance] : good compliance with treatment [Good Tolerance] : good tolerance of treatment [Fair Symptom Control] : fair symptom control [FreeTextEntry1] : The patient presents for a cough over the past week. She reports a h/o childhood asthma for which she was on Asmanex but did not require chronic therapy as an adult. She reports some increased symptoms of chest tightness and SOB this past summer and more recently was seen in UC for cough and SOB. She was given abx, Montelukast, and 50 mg of prednisone for 5 days. She reports that she was improving with prednisone but with recurrence of symptoms now. She c/o nasal and sinus congestion as well as PNDS. \par Pt was dx'd with Covid infection on 8/8/21 after her mother was dx'd with Covid and then again on 8/10 to confirm the dx. Pt had rhinorrhea, diarrhea, H/a, fatigue, loss of taste and smell, cough and SOB. She was treated with Decadron, Medrol dose delmy. She was vaccinated in Dec and Jan. She has residual cough and SOBOE remaining.

## 2021-09-16 NOTE — REVIEW OF SYSTEMS
[Nasal Congestion] : nasal congestion [Postnasal Drip] : postnasal drip [Sinus Problems] : sinus problems [Cough] : cough [Anxiety] : anxiety [Fever] : no fever [Chills] : no chills [Dry Eyes] : no dryness of the eyes [Eye Irritation] : no ~T irritation of the eyes [Epistaxis] : no nosebleeds [Sputum] : not coughing up ~M sputum [Dyspnea] : no dyspnea [Chest Tightness] : no chest tightness [Pleuritic Pain] : no pleuritic pain [Wheezing] : no wheezing [Hypertension] : no ~T hypertension [Chest Discomfort] : no chest discomfort [Dysrhythmia] : no dysrhythmia [Murmurs] : no murmurs were heard [Palpitations] : no palpitations [Edema] : ~T edema was not present [Hay Fever] : no hay fever [Reflux] : no reflux [Itchy Eyes] : no itching of ~T the eyes [Nausea] : no nausea [Vomiting] : no vomiting [Constipation] : no constipation [Diarrhea] : no diarrhea [Abdominal Pain] : no abdominal pain [Trauma] : no ~T physical trauma [Fracture] : no fracture [Anemia] : no anemia [Headache] : no headache [Dizziness] : no dizziness [Syncope] : no fainting [Numbness] : no numbness [Paralysis] : no paralysis was seen [Seizures] : no seizures [Depression] : no depression [Diabetes] : no diabetes mellitus [Thyroid Problem] : no thyroid problem

## 2021-09-16 NOTE — DISCUSSION/SUMMARY
[FreeTextEntry1] : \par #1. PFTs performed previously essentially normal; will repeat given recent Covid infection\par #2. The patient does not appear to require chronic BD therapy at this time but will continue her Symbicort for now\par #3. Xopenex as needed given palpitations on Albuterol inhaler\par #4. Continue Singulair for now\par #5. Diet and exercise for weight loss\par #6. Continue Flonase and add Astelin for PNDS\par #7. ENT/allergy f/u\par #8. Check CXR given recent Covid infection\par #9. F/u in 6 weeks with PFTs and CXR\par #10. Trial of prednisone for residual cough and SOBOE\par #11. Pt had both Covid vaccines \par #12. Reviewed risks of exposure and symptoms of Covid-19 virus, including how the virus is spread and precautions to avoid alexi virus.\par \par Patient's questions were answered and patient appears to understand these recommendations \par

## 2021-09-17 ENCOUNTER — APPOINTMENT (OUTPATIENT)
Dept: PULMONOLOGY | Facility: CLINIC | Age: 31
End: 2021-09-17
Payer: COMMERCIAL

## 2021-09-17 VITALS
OXYGEN SATURATION: 97 % | HEART RATE: 107 BPM | BODY MASS INDEX: 28.82 KG/M2 | SYSTOLIC BLOOD PRESSURE: 110 MMHG | DIASTOLIC BLOOD PRESSURE: 70 MMHG | RESPIRATION RATE: 15 BRPM | HEIGHT: 65 IN | WEIGHT: 173 LBS

## 2021-09-17 DIAGNOSIS — R05 COUGH: ICD-10-CM

## 2021-09-17 DIAGNOSIS — U07.1 COVID-19: ICD-10-CM

## 2021-09-17 PROCEDURE — 99214 OFFICE O/P EST MOD 30 MIN: CPT

## 2021-09-17 NOTE — REVIEW OF SYSTEMS
[Nasal Congestion] : nasal congestion [Postnasal Drip] : postnasal drip [Sinus Problems] : sinus problems [Cough] : cough [Anxiety] : anxiety [Fever] : no fever [Chills] : no chills [Dry Eyes] : no dryness of the eyes [Eye Irritation] : no ~T irritation of the eyes [Epistaxis] : no nosebleeds [Sputum] : not coughing up ~M sputum [Dyspnea] : no dyspnea [Chest Tightness] : no chest tightness [Pleuritic Pain] : no pleuritic pain [Wheezing] : no wheezing [Hypertension] : no ~T hypertension [Chest Discomfort] : no chest discomfort [Dysrhythmia] : no dysrhythmia [Murmurs] : no murmurs were heard [Palpitations] : no palpitations [Edema] : ~T edema was not present [Hay Fever] : no hay fever [Itchy Eyes] : no itching of ~T the eyes [Reflux] : no reflux [Nausea] : no nausea [Vomiting] : no vomiting [Constipation] : no constipation [Diarrhea] : no diarrhea [Abdominal Pain] : no abdominal pain [Trauma] : no ~T physical trauma [Fracture] : no fracture [Anemia] : no anemia [Headache] : no headache [Dizziness] : no dizziness [Syncope] : no fainting [Numbness] : no numbness [Paralysis] : no paralysis was seen [Seizures] : no seizures [Depression] : no depression [Diabetes] : no diabetes mellitus [Thyroid Problem] : no thyroid problem

## 2021-09-17 NOTE — HISTORY OF PRESENT ILLNESS
[Follow-Up - Routine Clinic] : a routine clinic follow-up of [Mild Persistent] : mild persistent [Dyspnea on Exertion] : dyspnea on exertion [Productive Cough] : productive cough [Currently Experiencing] : The patient is currently experiencing symptoms. [None] : No associated symptoms are reported [Inhaled Short-Acting Beta-2 Agonists] : inhaled short-acting beta-2 agonists [Inhaled Long-Acting Beta-2 Agonists] : inhaled long-acting beta-2 agonists [Inhaled Corticosteroids] : inhaled corticosteroids [Leukotriene Modifiers] : leukotriene modifiers [Good Compliance] : good compliance with treatment [Good Tolerance] : good tolerance of treatment [Fair Symptom Control] : fair symptom control [FreeTextEntry1] : The patient presents for a cough over the past week. She reports a h/o childhood asthma for which she was on Asmanex but did not require chronic therapy as an adult. She reports some increased symptoms of chest tightness and SOB this past summer and more recently was seen in UC for cough and SOB. She was given abx, Montelukast, and 50 mg of prednisone for 5 days. She reports that she was improving with prednisone but with recurrence of symptoms now. She c/o nasal and sinus congestion as well as PNDS. \par Pt was dx'd with Covid infection on 8/8/21 after her mother was dx'd with Covid and then again on 8/10 to confirm the dx. Pt had rhinorrhea, diarrhea, H/a, fatigue, loss of taste and smell, cough and SOB. She was treated with Decadron, Medrol dose delmy. She was vaccinated in Dec and Jan. She has residual cough and SOBOE remaining but overall is significantly improved. \par She reports that her mother recently passed from underlying cancer and is very emotional and depressed.

## 2021-09-17 NOTE — DISCUSSION/SUMMARY
[FreeTextEntry1] : \par #1. PFTs performed previously essentially normal; will repeat given recent Covid infection\par #2. The patient does not appear to require chronic BD therapy at this time but will continue her Symbicort for asthma\par #3. Xopenex as needed given palpitations on Albuterol inhaler\par #4. Continue Singulair for now\par #5. Diet and exercise for weight loss\par #6. Continue Flonase and add Astelin for PNDS\par #7. ENT/allergy f/u\par #8. Check CXR given recent Covid infection\par #9. F/u in 2-3 weeks with PFTs and CXR\par #10. Trial of prednisone for residual cough and SOBOE was completed with improvement\par #11. Pt had both Covid vaccines \par #12. Given the passing of her mother recently, she may be a candidate for bereavement leave and will f/u with her PCP regarding this\par #13. Reviewed risks of exposure and symptoms of Covid-19 virus, including how the virus is spread and precautions to avoid alexi virus.\par \par Patient's questions were answered and patient appears to understand these recommendations

## 2021-09-17 NOTE — CONSULT LETTER
[Dear  ___] : Dear  [unfilled], [Consult Letter:] : I had the pleasure of evaluating your patient, [unfilled]. [Please see my note below.] : Please see my note below. [Consult Closing:] : Thank you very much for allowing me to participate in the care of this patient.  If you have any questions, please do not hesitate to contact me. [Sincerely,] : Sincerely, [FreeTextEntry3] : Augustus Vuong MD, FCCP, D. ABSM\par Pulmonary and Sleep Medicine\par [facility] at [place]\par

## 2021-09-20 ENCOUNTER — TRANSCRIPTION ENCOUNTER (OUTPATIENT)
Age: 31
End: 2021-09-20

## 2021-10-01 ENCOUNTER — APPOINTMENT (OUTPATIENT)
Dept: DISASTER EMERGENCY | Facility: CLINIC | Age: 31
End: 2021-10-01

## 2021-10-02 LAB — SARS-COV-2 N GENE NPH QL NAA+PROBE: NOT DETECTED

## 2021-10-04 ENCOUNTER — APPOINTMENT (OUTPATIENT)
Dept: PULMONOLOGY | Facility: CLINIC | Age: 31
End: 2021-10-04
Payer: COMMERCIAL

## 2021-10-04 VITALS
OXYGEN SATURATION: 98 % | RESPIRATION RATE: 16 BRPM | HEART RATE: 84 BPM | SYSTOLIC BLOOD PRESSURE: 108 MMHG | DIASTOLIC BLOOD PRESSURE: 78 MMHG

## 2021-10-04 VITALS — BODY MASS INDEX: 29.66 KG/M2 | HEIGHT: 65 IN | WEIGHT: 178 LBS

## 2021-10-04 DIAGNOSIS — R09.82 POSTNASAL DRIP: ICD-10-CM

## 2021-10-04 DIAGNOSIS — Z23 ENCOUNTER FOR IMMUNIZATION: ICD-10-CM

## 2021-10-04 PROCEDURE — 94729 DIFFUSING CAPACITY: CPT

## 2021-10-04 PROCEDURE — 94010 BREATHING CAPACITY TEST: CPT

## 2021-10-04 PROCEDURE — 90686 IIV4 VACC NO PRSV 0.5 ML IM: CPT

## 2021-10-04 PROCEDURE — G0008: CPT

## 2021-10-04 PROCEDURE — 94727 GAS DIL/WSHOT DETER LNG VOL: CPT

## 2021-10-04 PROCEDURE — 99214 OFFICE O/P EST MOD 30 MIN: CPT | Mod: 25

## 2021-10-04 PROCEDURE — 85018 HEMOGLOBIN: CPT | Mod: QW

## 2021-10-04 NOTE — HISTORY OF PRESENT ILLNESS
[Follow-Up - Routine Clinic] : a routine clinic follow-up of [Mild Persistent] : mild persistent [Dyspnea on Exertion] : dyspnea on exertion [Productive Cough] : productive cough [Currently Experiencing] : The patient is currently experiencing symptoms. [None] : No associated symptoms are reported [Inhaled Short-Acting Beta-2 Agonists] : inhaled short-acting beta-2 agonists [Inhaled Long-Acting Beta-2 Agonists] : inhaled long-acting beta-2 agonists [Inhaled Corticosteroids] : inhaled corticosteroids [Leukotriene Modifiers] : leukotriene modifiers [Good Compliance] : good compliance with treatment [Good Tolerance] : good tolerance of treatment [Fair Symptom Control] : fair symptom control [FreeTextEntry1] : The patient presents for a cough over the past week. She reports a h/o childhood asthma for which she was on Asmanex but did not require chronic therapy as an adult. She reports some increased symptoms of chest tightness and SOB this past summer and more recently was seen in UC for cough and SOB. She was given abx, Montelukast, and 50 mg of prednisone for 5 days. She reports that she was improving with prednisone but with recurrence of symptoms now. She c/o nasal and sinus congestion as well as PNDS. \par Pt was dx'd with Covid infection on 8/8/21 after her mother was dx'd with Covid and then again on 8/10 to confirm the dx. Pt had rhinorrhea, diarrhea, H/a, fatigue, loss of taste and smell, cough and SOB. She was treated with Decadron, Medrol dose delmy. She was vaccinated in Dec and Jan. She is now near baseline.\par Patient's mother recently passed from underlying cancer and is very emotional and depressed.\par She also is now in a walking boot after suffering a torn ligament in her R ankle.\par

## 2021-10-04 NOTE — DISCUSSION/SUMMARY
[FreeTextEntry1] : \par #1. PFTs performed previously essentially normal; will repeat given recent Covid infection\par #2. The patient does not appear to require chronic BD therapy at this time but will continue her Symbicort for asthma though will decrease dose to 80/4.5\par #3. Xopenex as needed given palpitations on Albuterol inhaler\par #4. Continue Singulair given her "allergies"\par #5. Diet and exercise for weight loss\par #6. Continue Flonase and add Astelin for PNDS\par #7. ENT/allergy f/u\par #8. Check CXR given recent Covid infection\par #9. F/u in 2-3 months with spirometry and CXR\par #10. Trial of prednisone for residual cough and SOBOE was completed with improvement; will use as needed\par #11. Pt had both Covid vaccines; Flu vaccine was given per patient's request on 10/4/21\par #12. Given the passing of her mother recently, she may be a candidate for bereavement leave and will f/u with her PCP regarding this\par #13. Reviewed risks of exposure and symptoms of Covid-19 virus, including how the virus is spread and precautions to avoid alexi virus.\par \par Patient's questions were answered and patient appears to understand these recommendations

## 2021-10-04 NOTE — CONSULT LETTER
[Dear  ___] : Dear  [unfilled], [Consult Letter:] : I had the pleasure of evaluating your patient, [unfilled]. [Please see my note below.] : Please see my note below. [Consult Closing:] : Thank you very much for allowing me to participate in the care of this patient.  If you have any questions, please do not hesitate to contact me. [Sincerely,] : Sincerely, [FreeTextEntry3] : Augustus Vuong MD, FCCP, D. ABSM\par Pulmonary and Sleep Medicine\par [facility] [place]\par

## 2021-10-08 ENCOUNTER — NON-APPOINTMENT (OUTPATIENT)
Age: 31
End: 2021-10-08

## 2021-10-08 ENCOUNTER — APPOINTMENT (OUTPATIENT)
Dept: MRI IMAGING | Facility: CLINIC | Age: 31
End: 2021-10-08

## 2021-10-11 ENCOUNTER — OUTPATIENT (OUTPATIENT)
Dept: OUTPATIENT SERVICES | Facility: HOSPITAL | Age: 31
LOS: 1 days | End: 2021-10-11
Payer: COMMERCIAL

## 2021-10-11 ENCOUNTER — APPOINTMENT (OUTPATIENT)
Dept: RADIOLOGY | Facility: CLINIC | Age: 31
End: 2021-10-11
Payer: COMMERCIAL

## 2021-10-11 DIAGNOSIS — Z00.8 ENCOUNTER FOR OTHER GENERAL EXAMINATION: ICD-10-CM

## 2021-10-11 PROCEDURE — 71046 X-RAY EXAM CHEST 2 VIEWS: CPT

## 2021-10-11 PROCEDURE — 71046 X-RAY EXAM CHEST 2 VIEWS: CPT | Mod: 26

## 2021-10-12 ENCOUNTER — NON-APPOINTMENT (OUTPATIENT)
Age: 31
End: 2021-10-12

## 2021-10-16 ENCOUNTER — TRANSCRIPTION ENCOUNTER (OUTPATIENT)
Age: 31
End: 2021-10-16

## 2021-10-17 ENCOUNTER — TRANSCRIPTION ENCOUNTER (OUTPATIENT)
Age: 31
End: 2021-10-17

## 2021-10-19 ENCOUNTER — TRANSCRIPTION ENCOUNTER (OUTPATIENT)
Age: 31
End: 2021-10-19

## 2021-10-22 ENCOUNTER — APPOINTMENT (OUTPATIENT)
Dept: FAMILY MEDICINE | Facility: CLINIC | Age: 31
End: 2021-10-22
Payer: COMMERCIAL

## 2021-10-22 VITALS
WEIGHT: 175 LBS | HEIGHT: 65 IN | SYSTOLIC BLOOD PRESSURE: 120 MMHG | BODY MASS INDEX: 29.16 KG/M2 | DIASTOLIC BLOOD PRESSURE: 80 MMHG

## 2021-10-22 LAB
CHOLEST SERPL-MCNC: 193 MG/DL
HDLC SERPL-MCNC: 49 MG/DL
LDLC SERPL CALC-MCNC: NORMAL MG/DL
NONHDLC SERPL-MCNC: 143 MG/DL
TRIGL SERPL-MCNC: 432 MG/DL

## 2021-10-22 PROCEDURE — 99214 OFFICE O/P EST MOD 30 MIN: CPT

## 2021-10-22 RX ORDER — AMOXICILLIN 500 MG/1
500 CAPSULE ORAL TWICE DAILY
Qty: 10 | Refills: 0 | Status: DISCONTINUED | COMMUNITY
Start: 2021-08-13 | End: 2021-10-22

## 2021-10-22 RX ORDER — NORETHINDRONE AND ETHINYL ESTRADIOL 1 MG-35MCG
1-35 KIT ORAL
Qty: 90 | Refills: 4 | Status: DISCONTINUED | COMMUNITY
Start: 2021-01-15 | End: 2021-10-22

## 2021-10-22 RX ORDER — PREDNISONE 10 MG/1
10 TABLET ORAL
Qty: 70 | Refills: 0 | Status: DISCONTINUED | COMMUNITY
Start: 2021-08-27 | End: 2021-10-22

## 2021-10-22 RX ORDER — DEXAMETHASONE 6 MG/1
6 TABLET ORAL
Qty: 6 | Refills: 0 | Status: DISCONTINUED | COMMUNITY
Start: 2021-08-18 | End: 2021-10-22

## 2021-10-22 RX ORDER — METHYLPREDNISOLONE 4 MG/1
4 TABLET ORAL
Qty: 1 | Refills: 0 | Status: DISCONTINUED | COMMUNITY
Start: 2021-08-13 | End: 2021-10-22

## 2021-10-22 RX ORDER — ALBUTEROL SULFATE 90 UG/1
108 (90 BASE) INHALANT RESPIRATORY (INHALATION)
Qty: 2 | Refills: 2 | Status: DISCONTINUED | COMMUNITY
End: 2021-10-22

## 2021-10-22 NOTE — PHYSICAL EXAM
[Normal] : normal rate, regular rhythm, normal S1 and S2 and no murmur heard [de-identified] : right leg with splint on it. Patient able to walk

## 2021-10-22 NOTE — ASSESSMENT
[FreeTextEntry1] : \par Pre-DM\par Last HbA1C: 5.8% on 06/2021\par Lifestyle modifications discussed\par Could be related to her PCOS\par Would repeat HbA1C in 3 months\par \par HLD and overweight\par Lipid panel from 10/2021: still high TG, chol mildly improving. She has been trying fish oil with no improvement\par Starting fenofibrate 67 mcg\par Recheck LFTs within 1 month\par Repeat lpid panel in 3 months\par Lifestyle modifications discussed\par \par Depression and anxiety and bereavement\par Stable, managed by psych\par On Cymbalta, prescribed by her rheum for FM\par On Xanax as needed by her psychiatrist\par \par PCOS\par managed by Gyn\par On spironolactone and OCPs\par \par Return to care: within 1 month year for  LFTs check or earlier if needed\par Call or return for any questions

## 2021-10-22 NOTE — HISTORY OF PRESENT ILLNESS
[FreeTextEntry1] : follow up in D [de-identified] : Ms. COURTNEY DEY is a pleasant 31 year old female with PMH of asthma, last crisis 2 years ago, never intubated, PCOS, anxiety and depression on Xanax QHS and FM or RA? following up with rheum, pre-DM, HLD, tried fish oil. She is here for follow up in her HLD. No other complaints. She has been out of work for 5 weeks a right ankle ligament tar after she twisted her ankle. She has been seeing podiatry. She is going through bereavement because unfortunately her mother passed away a few weeks ago. She follows up with psychiatry, \par \par Psych: Chimma\par Rheum: Dr Wyatt and Dr Mendez\par Derm: Mathew Fuentes\par Gyn: Annabel Sahu\par Allergist: DA Guallpa in [facility] [place]

## 2021-11-24 LAB
ALT SERPL-CCNC: 59 U/L
AST SERPL-CCNC: 44 U/L

## 2021-12-03 ENCOUNTER — APPOINTMENT (OUTPATIENT)
Dept: RADIOLOGY | Facility: CLINIC | Age: 31
End: 2021-12-03
Payer: COMMERCIAL

## 2021-12-03 ENCOUNTER — APPOINTMENT (OUTPATIENT)
Dept: ULTRASOUND IMAGING | Facility: CLINIC | Age: 31
End: 2021-12-03
Payer: COMMERCIAL

## 2021-12-03 ENCOUNTER — OUTPATIENT (OUTPATIENT)
Dept: OUTPATIENT SERVICES | Facility: HOSPITAL | Age: 31
LOS: 1 days | End: 2021-12-03
Payer: COMMERCIAL

## 2021-12-03 DIAGNOSIS — R74.01 ELEVATION OF LEVELS OF LIVER TRANSAMINASE LEVELS: ICD-10-CM

## 2021-12-03 DIAGNOSIS — Z00.8 ENCOUNTER FOR OTHER GENERAL EXAMINATION: ICD-10-CM

## 2021-12-03 PROCEDURE — 76882 US LMTD JT/FCL EVL NVASC XTR: CPT | Mod: 26,RT

## 2021-12-03 PROCEDURE — 72040 X-RAY EXAM NECK SPINE 2-3 VW: CPT | Mod: 26

## 2021-12-03 PROCEDURE — 72100 X-RAY EXAM L-S SPINE 2/3 VWS: CPT | Mod: 26

## 2021-12-03 PROCEDURE — 72200 X-RAY EXAM SI JOINTS: CPT | Mod: 26

## 2021-12-03 PROCEDURE — 72100 X-RAY EXAM L-S SPINE 2/3 VWS: CPT

## 2021-12-03 PROCEDURE — 76700 US EXAM ABDOM COMPLETE: CPT | Mod: 26

## 2021-12-03 PROCEDURE — 72040 X-RAY EXAM NECK SPINE 2-3 VW: CPT

## 2021-12-03 PROCEDURE — 76700 US EXAM ABDOM COMPLETE: CPT

## 2021-12-03 PROCEDURE — 72200 X-RAY EXAM SI JOINTS: CPT

## 2021-12-03 PROCEDURE — 76882 US LMTD JT/FCL EVL NVASC XTR: CPT

## 2021-12-14 ENCOUNTER — EMERGENCY (EMERGENCY)
Facility: HOSPITAL | Age: 31
LOS: 1 days | Discharge: HOME | End: 2021-12-14
Attending: EMERGENCY MEDICINE
Payer: COMMERCIAL

## 2021-12-14 VITALS
SYSTOLIC BLOOD PRESSURE: 147 MMHG | RESPIRATION RATE: 16 BRPM | OXYGEN SATURATION: 99 % | HEART RATE: 94 BPM | TEMPERATURE: 98 F | DIASTOLIC BLOOD PRESSURE: 98 MMHG

## 2021-12-14 LAB
ALBUMIN SERPL ELPH-MCNC: 4.4 G/DL — SIGNIFICANT CHANGE UP (ref 3.3–5.2)
ALP SERPL-CCNC: 80 U/L — SIGNIFICANT CHANGE UP (ref 40–120)
ALT FLD-CCNC: 49 U/L — HIGH
ANION GAP SERPL CALC-SCNC: 15 MMOL/L — SIGNIFICANT CHANGE UP (ref 5–17)
APPEARANCE UR: CLEAR — SIGNIFICANT CHANGE UP
AST SERPL-CCNC: 29 U/L — SIGNIFICANT CHANGE UP
BACTERIA # UR AUTO: ABNORMAL
BASOPHILS # BLD AUTO: 0.07 K/UL — SIGNIFICANT CHANGE UP (ref 0–0.2)
BASOPHILS NFR BLD AUTO: 0.8 % — SIGNIFICANT CHANGE UP (ref 0–2)
BILIRUB SERPL-MCNC: 0.3 MG/DL — LOW (ref 0.4–2)
BILIRUB UR-MCNC: NEGATIVE — SIGNIFICANT CHANGE UP
BUN SERPL-MCNC: 4.4 MG/DL — LOW (ref 8–20)
CALCIUM SERPL-MCNC: 9.2 MG/DL — SIGNIFICANT CHANGE UP (ref 8.6–10.2)
CHLORIDE SERPL-SCNC: 101 MMOL/L — SIGNIFICANT CHANGE UP (ref 98–107)
CO2 SERPL-SCNC: 23 MMOL/L — SIGNIFICANT CHANGE UP (ref 22–29)
COLOR SPEC: YELLOW — SIGNIFICANT CHANGE UP
CREAT SERPL-MCNC: 0.52 MG/DL — SIGNIFICANT CHANGE UP (ref 0.5–1.3)
DIFF PNL FLD: ABNORMAL
EOSINOPHIL # BLD AUTO: 0.09 K/UL — SIGNIFICANT CHANGE UP (ref 0–0.5)
EOSINOPHIL NFR BLD AUTO: 1 % — SIGNIFICANT CHANGE UP (ref 0–6)
EPI CELLS # UR: SIGNIFICANT CHANGE UP
GLUCOSE SERPL-MCNC: 85 MG/DL — SIGNIFICANT CHANGE UP (ref 70–99)
GLUCOSE UR QL: NEGATIVE MG/DL — SIGNIFICANT CHANGE UP
HCG SERPL-ACNC: <4 MIU/ML — SIGNIFICANT CHANGE UP
HCT VFR BLD CALC: 39.6 % — SIGNIFICANT CHANGE UP (ref 34.5–45)
HGB BLD-MCNC: 13.5 G/DL — SIGNIFICANT CHANGE UP (ref 11.5–15.5)
IMM GRANULOCYTES NFR BLD AUTO: 0.3 % — SIGNIFICANT CHANGE UP (ref 0–1.5)
KETONES UR-MCNC: NEGATIVE — SIGNIFICANT CHANGE UP
LEUKOCYTE ESTERASE UR-ACNC: NEGATIVE — SIGNIFICANT CHANGE UP
LIDOCAIN IGE QN: 19 U/L — LOW (ref 22–51)
LYMPHOCYTES # BLD AUTO: 1.53 K/UL — SIGNIFICANT CHANGE UP (ref 1–3.3)
LYMPHOCYTES # BLD AUTO: 16.9 % — SIGNIFICANT CHANGE UP (ref 13–44)
MCHC RBC-ENTMCNC: 30.6 PG — SIGNIFICANT CHANGE UP (ref 27–34)
MCHC RBC-ENTMCNC: 34.1 GM/DL — SIGNIFICANT CHANGE UP (ref 32–36)
MCV RBC AUTO: 89.8 FL — SIGNIFICANT CHANGE UP (ref 80–100)
MONOCYTES # BLD AUTO: 0.9 K/UL — SIGNIFICANT CHANGE UP (ref 0–0.9)
MONOCYTES NFR BLD AUTO: 10 % — SIGNIFICANT CHANGE UP (ref 2–14)
NEUTROPHILS # BLD AUTO: 6.42 K/UL — SIGNIFICANT CHANGE UP (ref 1.8–7.4)
NEUTROPHILS NFR BLD AUTO: 71 % — SIGNIFICANT CHANGE UP (ref 43–77)
NITRITE UR-MCNC: NEGATIVE — SIGNIFICANT CHANGE UP
PH UR: 6 — SIGNIFICANT CHANGE UP (ref 5–8)
PLATELET # BLD AUTO: 454 K/UL — HIGH (ref 150–400)
POTASSIUM SERPL-MCNC: 3.7 MMOL/L — SIGNIFICANT CHANGE UP (ref 3.5–5.3)
POTASSIUM SERPL-SCNC: 3.7 MMOL/L — SIGNIFICANT CHANGE UP (ref 3.5–5.3)
PROT SERPL-MCNC: 7.9 G/DL — SIGNIFICANT CHANGE UP (ref 6.6–8.7)
PROT UR-MCNC: NEGATIVE — SIGNIFICANT CHANGE UP
RBC # BLD: 4.41 M/UL — SIGNIFICANT CHANGE UP (ref 3.8–5.2)
RBC # FLD: 11.5 % — SIGNIFICANT CHANGE UP (ref 10.3–14.5)
RBC CASTS # UR COMP ASSIST: ABNORMAL /HPF (ref 0–4)
SODIUM SERPL-SCNC: 139 MMOL/L — SIGNIFICANT CHANGE UP (ref 135–145)
SP GR SPEC: 1.01 — SIGNIFICANT CHANGE UP (ref 1.01–1.02)
UROBILINOGEN FLD QL: NEGATIVE MG/DL — SIGNIFICANT CHANGE UP
WBC # BLD: 9.04 K/UL — SIGNIFICANT CHANGE UP (ref 3.8–10.5)
WBC # FLD AUTO: 9.04 K/UL — SIGNIFICANT CHANGE UP (ref 3.8–10.5)
WBC UR QL: SIGNIFICANT CHANGE UP

## 2021-12-14 PROCEDURE — 99285 EMERGENCY DEPT VISIT HI MDM: CPT

## 2021-12-14 PROCEDURE — 84702 CHORIONIC GONADOTROPIN TEST: CPT

## 2021-12-14 PROCEDURE — 99284 EMERGENCY DEPT VISIT MOD MDM: CPT | Mod: 25

## 2021-12-14 PROCEDURE — 96374 THER/PROPH/DIAG INJ IV PUSH: CPT

## 2021-12-14 PROCEDURE — 74176 CT ABD & PELVIS W/O CONTRAST: CPT | Mod: 26,MB

## 2021-12-14 PROCEDURE — 96375 TX/PRO/DX INJ NEW DRUG ADDON: CPT

## 2021-12-14 PROCEDURE — 83690 ASSAY OF LIPASE: CPT

## 2021-12-14 PROCEDURE — 72131 CT LUMBAR SPINE W/O DYE: CPT | Mod: 26,MB

## 2021-12-14 PROCEDURE — 36415 COLL VENOUS BLD VENIPUNCTURE: CPT

## 2021-12-14 PROCEDURE — 81001 URINALYSIS AUTO W/SCOPE: CPT

## 2021-12-14 PROCEDURE — 74176 CT ABD & PELVIS W/O CONTRAST: CPT | Mod: MB

## 2021-12-14 PROCEDURE — 87086 URINE CULTURE/COLONY COUNT: CPT

## 2021-12-14 PROCEDURE — 80053 COMPREHEN METABOLIC PANEL: CPT

## 2021-12-14 PROCEDURE — 85025 COMPLETE CBC W/AUTO DIFF WBC: CPT

## 2021-12-14 RX ORDER — SODIUM CHLORIDE 9 MG/ML
1000 INJECTION INTRAMUSCULAR; INTRAVENOUS; SUBCUTANEOUS ONCE
Refills: 0 | Status: COMPLETED | OUTPATIENT
Start: 2021-12-14 | End: 2021-12-14

## 2021-12-14 RX ORDER — CYCLOBENZAPRINE HYDROCHLORIDE 10 MG/1
10 TABLET, FILM COATED ORAL ONCE
Refills: 0 | Status: COMPLETED | OUTPATIENT
Start: 2021-12-14 | End: 2021-12-14

## 2021-12-14 RX ORDER — ONDANSETRON 8 MG/1
4 TABLET, FILM COATED ORAL ONCE
Refills: 0 | Status: COMPLETED | OUTPATIENT
Start: 2021-12-14 | End: 2021-12-14

## 2021-12-14 RX ORDER — KETOROLAC TROMETHAMINE 30 MG/ML
15 SYRINGE (ML) INJECTION ONCE
Refills: 0 | Status: DISCONTINUED | OUTPATIENT
Start: 2021-12-14 | End: 2021-12-14

## 2021-12-14 RX ADMIN — Medication 15 MILLIGRAM(S): at 20:05

## 2021-12-14 RX ADMIN — CYCLOBENZAPRINE HYDROCHLORIDE 10 MILLIGRAM(S): 10 TABLET, FILM COATED ORAL at 22:32

## 2021-12-14 RX ADMIN — SODIUM CHLORIDE 1000 MILLILITER(S): 9 INJECTION INTRAMUSCULAR; INTRAVENOUS; SUBCUTANEOUS at 20:04

## 2021-12-14 RX ADMIN — ONDANSETRON 4 MILLIGRAM(S): 8 TABLET, FILM COATED ORAL at 20:04

## 2021-12-14 NOTE — ED PROVIDER NOTE - OBJECTIVE STATEMENT
pt is a 30 y/o female with a pmhx of ankylosing spondylitis, psoriatic arthritis presenting to the ed for left flank pain that started today in the afternoon. pt denies injuries or trauma to the area. pt states pain comes and goes, is worse with movement, feels different than her regular MSK pain. pt denies past history of kidney stones. pt denies cp sob fever abd pain vomiting diarrhea dysuria numbness or loss of sensation urinary or fecal incontinence

## 2021-12-14 NOTE — ED PROVIDER NOTE - PATIENT PORTAL LINK FT
You can access the FollowMyHealth Patient Portal offered by [facility] by registering at the following website: [email or web address]. By joining [employer]’s FollowMyHealth portal, you will also be able to view your health information using other applications (apps) compatible with our system.

## 2021-12-14 NOTE — ED PROVIDER NOTE - PHYSICAL EXAMINATION
Const: Awake, alert and oriented. In no acute distress. Well appearing.  HEENT: NC/AT. Moist mucous membranes.  Eyes: No scleral icterus. EOMI.  Neck:. Soft and supple. Full ROM without pain.  Cardiac: +S1/S2. No murmurs. Peripheral pulses 2+ and symmetric. No LE edema.  Resp: Speaking in full sentences. No evidence of respiratory distress. No wheezes, rales or rhonchi.  Abd: Soft, non-tender, non-distended. Normal bowel sounds in all 4 quadrants. No guarding or rebound.  Back: Spine midline and non-tender. No CVAT.  Skin: No rashes, abrasions or lacerations.  Lymph: No cervical lymphadenopathy.  Neuro: Awake, alert & oriented x 3. CN II-XII intact finger to nose intact neurovasculary intact muscle strength 5/5 x 4 extremities gait without ataxia reflexes intact

## 2021-12-14 NOTE — ED ADULT NURSE NOTE - OBJECTIVE STATEMENT
Assumed care of the pt AOx4 in no acute distress. Pt complaining of flank pain, since 1400 today. Pt Denies any urinary symptoms, fevers, chills or vomiting at this time. Pt denies pregnancy meds given as per orders, labs sent as per orders pt educated on plan of care, pt able to successfully teach back plan of care to RN, RN will continue to reeducate pt during hospital stay.

## 2021-12-14 NOTE — ED PROVIDER NOTE - ATTENDING CONTRIBUTION TO CARE
Uziel SCRUGGS- 30 Y/O F with h/o ankylosing spondylitis, psoriatic arthritis p/w left sided flank pain for few dasy and is persistent and deep , no nausea, vomiting, diarrhea . Pt is currently on her periods., Pt is expected to start humera soon.     Pt is alert, well appearing female, s1s2 normal reg, b/l clear breath sounds, abd soft, nt, nd, no cvat b/l, no spine tenderness, skin warm, dry, good turgor    Plan to r/o kidney stone, pyel, treat pain, hydrate and reassess

## 2021-12-15 LAB
CULTURE RESULTS: SIGNIFICANT CHANGE UP
SPECIMEN SOURCE: SIGNIFICANT CHANGE UP

## 2021-12-17 ENCOUNTER — APPOINTMENT (OUTPATIENT)
Dept: FAMILY MEDICINE | Facility: CLINIC | Age: 31
End: 2021-12-17
Payer: COMMERCIAL

## 2021-12-17 VITALS
DIASTOLIC BLOOD PRESSURE: 80 MMHG | HEIGHT: 65 IN | WEIGHT: 185 LBS | SYSTOLIC BLOOD PRESSURE: 116 MMHG | BODY MASS INDEX: 30.82 KG/M2

## 2021-12-17 DIAGNOSIS — M79.10 MYALGIA, UNSPECIFIED SITE: ICD-10-CM

## 2021-12-17 PROCEDURE — 99214 OFFICE O/P EST MOD 30 MIN: CPT | Mod: 25

## 2021-12-17 PROCEDURE — 36415 COLL VENOUS BLD VENIPUNCTURE: CPT

## 2021-12-17 NOTE — HISTORY OF PRESENT ILLNESS
[FreeTextEntry1] : follow up [de-identified] : Ms. COURTNEY DEY is a pleasant 31 year old female with PMH of asthma, last crisis 2 years ago, never intubated, PCOS, anxiety and depression on Xanax QHS, and FM or RA? and now recently diagnosed with ankylosing spondylitis following up with rheum, pre-DM, HLD. She is here for follow up in her HLD and for following up from ED hospital visit. She went to [facility] ED on 12/14/2021 with back pain, got a negative CT scan and UCx. She was discharged with Flexeril which alleviated her symptoms.\par She was recently diagnosed with ankylosing spondylitis by her rheumatologist but I don't have those records.She will follow start Humira soon with her rheumatologist Dr Gale\par \par Repeat ALT from 12/14 went down to 49 from 59. She has  been on Fenofibrate for about 2 months now\par No other complaints\par \par \par Psych: Chimma\par Rheum: Rosemary Bhatti [phone]\par Derm: Mathew Fuentes\par Gyn: Annabel Sahu\par Allergist: DA Guallpa in [facility]

## 2021-12-17 NOTE — DATA REVIEWED
[FreeTextEntry1] : Additional time spent outside of H&P in I reviewing information including but not limited to previous notes, inpatient records in the [facility] and laboratories for this patient

## 2021-12-17 NOTE — PLAN
[FreeTextEntry1] : \par HLD and overweight\par Lipid panel from 10/2021: still high TG, chol mildly improving. \par On 12/14/2021: ALT down to 49 from 59 per [facility], AST WNL\par Continue fenofibrate 67 mcg\par Repeat lipid panel in 3 months\par Lifestyle modifications discussed\par \par Pre-DM\par Last HbA1C: 5.8% on 06/2021, stable from jan/2021.\par Her glucose was WNL on 12/14/2021 CMP: 85\par Lifestyle modifications discussed\par Could be related to her PCOS\par Would repeat HbA1C on next blood draw\par \par Chronic back pain\par Per CT scan at [facility]: degenerative changes in L5-S1\par UCx was negative\par Her rheum told her that she has ankylosing spondylitis, will need records. Has not had an MRI\par Ordering spine MRI\par She currently has left lower muscle pain. Sending Flexeril which helped her recently\par Referring to PMR\par \par Transaminitis improving as noted above\par Had neg Hep panel in june/2021\par Has fatty liver per abd CT on 12/14/2021 and abd US on 12/03/2021, which could explain the transaminitis\par Avoid Alcohol, Tylenol\par \par Return to care: within 3 months for  HLD and pre-DM follow up or earlier if needed\par Call or return for any questions \par \par

## 2021-12-21 LAB
CHOLEST SERPL-MCNC: 209 MG/DL
HDLC SERPL-MCNC: 56 MG/DL
LDLC SERPL CALC-MCNC: 126 MG/DL
NONHDLC SERPL-MCNC: 153 MG/DL
TRIGL SERPL-MCNC: 135 MG/DL

## 2021-12-30 ENCOUNTER — APPOINTMENT (OUTPATIENT)
Dept: MRI IMAGING | Facility: CLINIC | Age: 31
End: 2021-12-30

## 2022-01-02 ENCOUNTER — APPOINTMENT (OUTPATIENT)
Dept: DISASTER EMERGENCY | Facility: CLINIC | Age: 32
End: 2022-01-02

## 2022-01-06 ENCOUNTER — APPOINTMENT (OUTPATIENT)
Dept: PULMONOLOGY | Facility: CLINIC | Age: 32
End: 2022-01-06

## 2022-01-26 ENCOUNTER — NON-APPOINTMENT (OUTPATIENT)
Age: 32
End: 2022-01-26

## 2022-01-27 ENCOUNTER — NON-APPOINTMENT (OUTPATIENT)
Age: 32
End: 2022-01-27

## 2022-02-14 ENCOUNTER — APPOINTMENT (OUTPATIENT)
Dept: FAMILY MEDICINE | Facility: CLINIC | Age: 32
End: 2022-02-14
Payer: COMMERCIAL

## 2022-02-14 VITALS
BODY MASS INDEX: 30.82 KG/M2 | WEIGHT: 185 LBS | DIASTOLIC BLOOD PRESSURE: 95 MMHG | HEART RATE: 97 BPM | HEIGHT: 65 IN | SYSTOLIC BLOOD PRESSURE: 123 MMHG

## 2022-02-14 DIAGNOSIS — M54.9 DORSALGIA, UNSPECIFIED: ICD-10-CM

## 2022-02-14 DIAGNOSIS — G89.29 DORSALGIA, UNSPECIFIED: ICD-10-CM

## 2022-02-14 PROCEDURE — 99214 OFFICE O/P EST MOD 30 MIN: CPT

## 2022-02-14 NOTE — PHYSICAL EXAM
[Normal] : normal rate, regular rhythm, normal S1 and S2 and no murmur heard [Soft] : abdomen soft [de-identified] : Equivocal b/l  straight raise tests. + spinal and right paraspinal tenderness. No lesions or rash noted. [de-identified] : A [de-identified] : Adequate strength and sensation of both lower extermities

## 2022-02-14 NOTE — ASSESSMENT
[FreeTextEntry1] : \par Acute on chronic back pain\par --Per Spine Xray results from 01/24/2022:\par -Thoracic: trace dextroscoliosis\par -Cervical: Straightening of the normal cervical lordosis\par -Lumbar: Mild levoscoliosis\par \par -Advised to try cold/warm compresses\par -Avoid heavy lifting and overstretching\par -Continue stretching exercises\par -Advised to wear a back brace\par -Will do a trial of prednisone 60 mg x 5 days with a taper after \par -Renewing her Flexeril, advised that may get drowsy and not to drive while taking it\par -Pending spine MRI, reordering\par -Can use an assistive device\par -Pending to see PMR on 03/01/2022.\par Alarm symptoms discussed  including but not limited to worsening back pain, leg/back numbness, bowel or urinary incontinence, leg weakness and I advised the patient to go to the emergency department if these symptoms appear. \par \par Advised to take a few days off. Patient would like to go back to work soon. Advised that if feels better, can go back on 02/16/2022 with light duties\par \par HLD and overweight\par Lipid panel from 12/2021: TG now in target. \par On 12/14/2021: ALT down to 49 from 59 per [facility], AST WNL\par Continue fenofibrate 67 mcg\par Repeat lipid panel in march/2022\par Lifestyle modifications discussed\par \par Pre-DM\par Last HbA1C: 5.8% on 06/2021, stable from jan/2021.\par Lifestyle modifications discussed\par Could be related to her PCOS\par Would repeat HbA1C on next blood draw\par \par Transaminitis improving as noted above\par Had neg Hep panel in june/2021\par Has fatty liver per abd CT on 12/14/2021 and abd US on 12/03/2021, which could explain the transaminitis\par Avoid Alcohol, Tylenol\par Repeat ALT from 12/14 went down to 49 from 59 and reports ALT from 02/08/2022 by her rheum: ALT 43 and AST was 24 but I don't have those records\par \par Return to care: within 1 months for back pain/HLD and pre-DM follow up or earlier if needed\par Call or return for any questions

## 2022-02-14 NOTE — HISTORY OF PRESENT ILLNESS
[FreeTextEntry1] : follow up, back pain [de-identified] : Ms. COURTNEY DEY is a pleasant 31 year old female with PMH of asthma, last crisis 2 years ago, never intubated, PCOS, anxiety and depression on Xanax QHS, and FM or RA? and now recently diagnosed with ankylosing spondylitis following up with rheum, pre-DM, HLD. She is here for follow up in her HLD and for back pain. 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 Her pain is severe, impairs her walk. Flexeril helps but last night was not enough. Denies saddle anesthesia, bowel or urinary movements, leg weakness or numbness.\par She was at [facility] ED on 12/14/2021 with back pain, got a negative CT scan and UCx. She was discharged with Flexeril which alleviated her symptoms.\par She as previously referred to PMR, will see them on 03/01/21022. I also ordered an MRI which still pending by the insurance.\par \par She was recently diagnosed with ankylosing spondylitis by her rheumatologist Dr Berg but I don't have those records.\par \par Repeat ALT from 12/14 went down to 49 from 59 and reports ALT from 02/08/2022 by her rheum: ALT 43 and AST was 24 but I don't have those records. She has been on Fenofibrate since 10/2021\par \par \par Psych: Chimma\par Rheum: Rosemary Davy [phone]\par Derm: Mathew Fuentes\par Gyn: Annabel Sahu\par Allergist: DA Guallpa in [facility]

## 2022-02-21 ENCOUNTER — NON-APPOINTMENT (OUTPATIENT)
Age: 32
End: 2022-02-21

## 2022-02-25 ENCOUNTER — APPOINTMENT (OUTPATIENT)
Dept: PHYSICAL MEDICINE AND REHAB | Facility: CLINIC | Age: 32
End: 2022-02-25
Payer: OTHER MISCELLANEOUS

## 2022-02-25 PROCEDURE — 99072 ADDL SUPL MATRL&STAF TM PHE: CPT

## 2022-02-25 PROCEDURE — 99204 OFFICE O/P NEW MOD 45 MIN: CPT | Mod: GC

## 2022-02-25 NOTE — HISTORY OF PRESENT ILLNESS
[FreeTextEntry1] : Ms. COURTNEY DEY is a 31 year old female with a history of asthma, PCOS, anxiety/depression, and recent diagnosis of ankylosing spondylitis/psoriatic arthritis who presents with low back pain. 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 She developed severe R-sided low back pain radiating to R buttock and posterior leg. She feels that the right leg has been weak and drags when she's walking. She has had low back pain chronically for which she has seen a chiropractor. She has previously been seen in the ER for L>R back pain and "spasm" 12/14/21 and had negative CT; she was discharged on Flexeril. XR C/T/L spine 1/24/22 showed neutralization of cervical lordosis, thoracic dextro, and mild lumbar levoscoliosis. Patient saw PCP on 2/14/22 and was prescribed course of prednisone 60mg x5 days with taper that greatly improved her pain. MRI was ordered by PCP but denied by insurance. 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 \par \par Location: R low back/buttock\par Onset: acutely worsened 2/9\par Provocation/Palliative: Improved with prednisone taper, heat worse with sitting for long periods. \par Quality: dull constant ache and spasm, with occasional sharp pain in back and leg\par Radiation: through R buttock to posterior leg/calf\par Severity: 8/10 at worst, 5/10 since steroid taper\par Timing: constant throughout the day, worse at times\par  \par Denies any associated numbness. Denies any associated leg weakness. Denies any loss of bowel/bladder control or any groin numbness.\par Previous medications trialed: Flexeril, prednisone, ibuprofen\par Previous procedures relevant to complaint: N/A\par Conservative therapy tried?: Chiropractor, heat/ice

## 2022-02-25 NOTE — DATA REVIEWED
[FreeTextEntry1] : XR L spine [facility] radiology 22 showed mild lumbar levoscoliosis, no significant degenerative changes\par \par PATIENT NAME: Lyric Jackson\par PATIENT PHONE NUMBER:\par PATIENT ID: [id number]\par : 1990\par DATE OF EXAM: 2022\par R. Phys. Name: Jaime Mcfarlane Alan\par R. Phys. Address: [address] [place] [address]\par R. Phys. Phone: [phone]\par LS SPINE XRAY WITH BEND\par \par HISTORY: M54.5 Lower Back Pain M62.830 Spasm of back muscles M62.81\par Muscle weakness M54.41 Lower back pain with right sciatica M79.604\par Right Leg Pain\par \par AP, lateral, and oblique and lateral bending views of the lumbosacral spine are\par obtained.\par \par There is a levoscoliosis. The vertebral bodies are normal height. There are no\par destructive abnormalities or fractures. The intervertebral disc spaces appear\par preserved. There is no spondylolisthesis. The facet joints appear unremarkable.\par There is no abnormal motion comparing lateral bending views.\par \par IMPRESSION:\par Mild levoscoliosis M41.7\par \par Signed by: Alix Gruber MD\par Signed Date: 2022 1:12 PM EST\par \par \par

## 2022-02-25 NOTE — ASSESSMENT
[FreeTextEntry1] : Ms. 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 She has completed a steroid taper with significant improvement, though continues to have marked pain. She also reports subjective RLE weakness and pain shooting down the posterior right leg. Suspect lumbar radiculopathy 2/2 acute disc herniation, possibly L5/S1 level. Denies any red flag signs.\par Will recommend:\par -MRI L-spine given persistent pain despite conservative management\par -Start PT 2-3x/week for stretching, strengthening (especially of core muscles), ROM exercises, HEP and modalities PRN including myofascial release, moist heat, and TENS therapy \par -Tylenol PRN with caution given recent transaminitis. Counseled to limit tylenol to 1g daily\par -Avoid NSAIDs given dyspepsia. Follow-up with GI as scheduled.\par -Continue flexeril QHS PRN\par -RTC in 2-3 weeks with results of MRI. Patient aware of red flag signs including any changes to their bowel/bladder control, groin numbness or new weakness. Patient knows to seek immediate attention by calling 911 or going to nearest ER if these symptoms appear.

## 2022-02-25 NOTE — PHYSICAL EXAM
[FreeTextEntry1] : PE:\par \par Constitutional: In NAD, calm and cooperative\par MSK (Back)\par 	Inspection: no gross swelling identified\par 	Palpation: Tenderness of the bilateral lower lumbar paraspinals R>L\par 	ROM: Pain at end lumbar extension>flexion\par 	Strength: 5/5 strength in bilateral lower extremities\par 	Sensation: Intact to light touch in bilateral lower extremities\par Special tests: \par SLR: positive on right \par REESE: positive on right\par FADIR: negative \par Facet loading: equivocal on right (pain L>R)

## 2022-03-01 ENCOUNTER — APPOINTMENT (OUTPATIENT)
Dept: INTERNAL MEDICINE | Facility: CLINIC | Age: 32
End: 2022-03-01
Payer: COMMERCIAL

## 2022-03-01 ENCOUNTER — NON-APPOINTMENT (OUTPATIENT)
Age: 32
End: 2022-03-01

## 2022-03-01 VITALS
SYSTOLIC BLOOD PRESSURE: 124 MMHG | HEIGHT: 65 IN | WEIGHT: 195 LBS | BODY MASS INDEX: 32.49 KG/M2 | DIASTOLIC BLOOD PRESSURE: 82 MMHG

## 2022-03-01 DIAGNOSIS — K21.9 GASTRO-ESOPHAGEAL REFLUX DISEASE W/OUT ESOPHAGITIS: ICD-10-CM

## 2022-03-01 DIAGNOSIS — R10.13 EPIGASTRIC PAIN: ICD-10-CM

## 2022-03-01 PROCEDURE — 36415 COLL VENOUS BLD VENIPUNCTURE: CPT

## 2022-03-01 PROCEDURE — 99212 OFFICE O/P EST SF 10 MIN: CPT | Mod: 25

## 2022-03-01 NOTE — HISTORY OF PRESENT ILLNESS
[FreeTextEntry8] : Pt reports having burning sensation in the epigastric area since she was taking prednisone and Ibuprofen for lower back pain. \par Also constantly burping. Appetite is good. No fewer,chills. No melena.

## 2022-03-01 NOTE — PHYSICAL EXAM
[No Acute Distress] : no acute distress [Well Nourished] : well nourished [Well Developed] : well developed [Soft] : abdomen soft [Non-distended] : non-distended [Alert and Oriented x3] : oriented to person, place, and time [Normal Mood] : the mood was normal [de-identified] : slight epigastric tenderness.

## 2022-03-01 NOTE — PHYSICAL EXAM
[No Acute Distress] : no acute distress [Well Nourished] : well nourished [Well Developed] : well developed [Soft] : abdomen soft [Non-distended] : non-distended [Alert and Oriented x3] : oriented to person, place, and time [Normal Mood] : the mood was normal [de-identified] : slight epigastric tenderness.

## 2022-03-08 LAB
ALBUMIN SERPL ELPH-MCNC: 4.1 G/DL
ALP BLD-CCNC: 86 U/L
ALT SERPL-CCNC: 58 U/L
ANION GAP SERPL CALC-SCNC: 13 MMOL/L
AST SERPL-CCNC: 28 U/L
BASOPHILS # BLD AUTO: 0.07 K/UL
BASOPHILS NFR BLD AUTO: 0.6 %
BILIRUB SERPL-MCNC: 0.2 MG/DL
BUN SERPL-MCNC: 7 MG/DL
CALCIUM SERPL-MCNC: 9.4 MG/DL
CHLORIDE SERPL-SCNC: 102 MMOL/L
CO2 SERPL-SCNC: 22 MMOL/L
CREAT SERPL-MCNC: 0.66 MG/DL
EGFR: 122 ML/MIN/1.73M2
EOSINOPHIL # BLD AUTO: 0.16 K/UL
EOSINOPHIL NFR BLD AUTO: 1.5 %
GLUCOSE SERPL-MCNC: 144 MG/DL
HCT VFR BLD CALC: 39.7 %
HGB BLD-MCNC: 12.9 G/DL
IMM GRANULOCYTES NFR BLD AUTO: 0.5 %
LYMPHOCYTES # BLD AUTO: 2.55 K/UL
LYMPHOCYTES NFR BLD AUTO: 23.6 %
MAN DIFF?: NORMAL
MCHC RBC-ENTMCNC: 30.9 PG
MCHC RBC-ENTMCNC: 32.5 GM/DL
MCV RBC AUTO: 95.2 FL
MONOCYTES # BLD AUTO: 0.96 K/UL
MONOCYTES NFR BLD AUTO: 8.9 %
NEUTROPHILS # BLD AUTO: 7.03 K/UL
NEUTROPHILS NFR BLD AUTO: 64.9 %
PLATELET # BLD AUTO: 442 K/UL
POTASSIUM SERPL-SCNC: 4 MMOL/L
PROT SERPL-MCNC: 7.2 G/DL
RBC # BLD: 4.17 M/UL
RBC # FLD: 12.1 %
SODIUM SERPL-SCNC: 137 MMOL/L
WBC # FLD AUTO: 10.82 K/UL

## 2022-03-11 ENCOUNTER — APPOINTMENT (OUTPATIENT)
Dept: PHYSICAL MEDICINE AND REHAB | Facility: CLINIC | Age: 32
End: 2022-03-11
Payer: OTHER MISCELLANEOUS

## 2022-03-11 VITALS
WEIGHT: 195 LBS | HEIGHT: 65 IN | RESPIRATION RATE: 12 BRPM | SYSTOLIC BLOOD PRESSURE: 140 MMHG | HEART RATE: 96 BPM | DIASTOLIC BLOOD PRESSURE: 98 MMHG | BODY MASS INDEX: 32.49 KG/M2

## 2022-03-11 DIAGNOSIS — M79.7 FIBROMYALGIA: ICD-10-CM

## 2022-03-11 DIAGNOSIS — Z01.818 ENCOUNTER FOR OTHER PREPROCEDURAL EXAMINATION: ICD-10-CM

## 2022-03-11 PROCEDURE — 99214 OFFICE O/P EST MOD 30 MIN: CPT

## 2022-03-11 PROCEDURE — 99072 ADDL SUPL MATRL&STAF TM PHE: CPT

## 2022-03-11 NOTE — DATA REVIEWED
[FreeTextEntry1] : 	\par Exam requested by:\par NADYA COTTER MD\par [address] , [facility] [id number]\par [facility] [id number]\par SITE PERFORMED: [facility]\par Patient: COURTNEY DEY\par YOB: 1990\par Phone: [phone]\par MRN: [id number] Acc: [id number]\par Date of Exam: 03-\par  \par EXAM:  MRI LUMBAR SPINE WITHOUT CONTRAST\par \par HISTORY: Low back pain. Lower extremity radicular pain. Status post work incident.\par \par TECHNIQUE:  An MRI examination of the lumbar spine was performed utilizing sagittal T1 weighted and T2 weighted images as well as axial T1 weighted and T2 weighted images. Sagittal inversion recovery imaging was also performed. \par \par COMPARISON:  No prior MRI lumbar study is available for comparison.\par \par FINDINGS: \par \par The alignment of the lumbar vertebral bodies is normal in the sagittal plane. Degenerative disc desiccation disc space narrowing is noted at L1-2 and \par L5-S1.\par \par No acute compression fracture is observed.  No pathologic bone marrow signal is observed.\par \par L5 - S1: Moderately large central/left paracentral extruded disc herniation with mild superior extension compresses the ventral thecal sac and narrows left lateral recess. There is compression and posterior displacement of the left S1 nerve root. The neural foramina are patent.\par \par L4 - L5: There is no disc herniation, spinal stenosis, or foraminal stenosis.\par \par L3 - L4: There is no disc herniation, spinal stenosis, or foraminal stenosis.\par \par L2 - L3: There is no disc herniation, spinal stenosis, or foraminal stenosis.\par \par L1 - L2: Small right paracentral disc herniation indents ventral thecal sac and demonstrates slight superior extension. There is no central canal stenosis or foraminal stenosis.\par \par The conus medullaris is normal in position and signal characteristics. No intradural or paraspinal mass lesion is observed.\par \par IMPRESSION:\par \par Central/left paracentral extruded disc herniation with mild superior extension at L5-S1 compresses and posteriorly displaces the left S1 nerve root.\par \par Small right paracentral disc herniation at L1-2.\par \par Thank you for the opportunity to participate in the care of this patient.  \par  \par Mihir Acosta Jr, MD  - Electronically Signed: 03- 9:13 AM \par Physician to Physician Direct Line is: [phone]

## 2022-03-11 NOTE — HISTORY OF PRESENT ILLNESS
[FreeTextEntry1] : Ms. COURTNEY DEY is a 31 year old  female who presents for follow up for back pain. At last visit, an MRI L Spine was ordered, she was started on PT, advised caution with Tylenol, avoid NSAIDs 2/2 dyspepsia, continue flexiril PRN. She has since had continued pain, now also in her bilateral low back>legs.\par \par Location: R>L low back/buttock\par Onset: acutely worsened 2/9/22\par Provocation/Palliative: Improved with prednisone taper, heat worse with sitting/standing for long periods. \par Quality: dull constant ache and spasm, with occasional sharp pain in back and leg\par Radiation: through R>L buttock to posterior leg/calf\par Severity: 8/10 at worst\par Timing: constant throughout the day, worse at times\par \par No bowel/bladder changes. No groin numbness. 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 She developed severe R-sided low back pain radiating to R buttock and posterior leg. She feels that the right leg has been weak and drags when she's walking. She has had low back pain chronically for which she has seen a chiropractor. She has previously been seen in the ER for L>R back pain and "spasm" 12/14/21 and had negative CT; she was discharged on Flexeril. XR C/T/L spine 1/24/22 showed neutralization of cervical lordosis, thoracic dextro, and mild lumbar levoscoliosis. Patient saw PCP on 2/14/22 and was prescribed course of prednisone 60mg x5 days with taper that greatly improved her pain. MRI was ordered by PCP but denied by insurance. 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 \par

## 2022-03-11 NOTE — PHYSICAL EXAM
[FreeTextEntry1] : PE:\par \par Constitutional: In NAD, calm and cooperative\par MSK (Back)\par 	Inspection: no gross swelling identified\par 	Palpation: Tenderness of the bilateral lower lumbar paraspinals R>L\par 	ROM: Pain at end lumbar extension>flexion\par 	Strength: 5/5 strength in bilateral lower extremities\par 	Sensation: Intact to light touch in bilateral lower extremities\par Special tests: \par SLR: positive bilaterally\par Facet loading: equivocal on right (pain L>R)

## 2022-03-11 NOTE — ASSESSMENT
[FreeTextEntry1] : Ms. 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 She has completed a steroid taper with significant improvement, though continues to have marked pain. She also reports subjective RLE>LLE weakness and pain shooting down the posterior right>leg leg. Suspect lumbar radiculopathy 2/2 acute disc herniation, as confirmed on MRI. Denies any red flag signs.\par Will recommend:\par - Discussed with patient the risks (including but not limited to bleeding, infection, nerve damage, etc), benefits and alternatives to an epidural steroid injection for which patient understands. Will plan for a bilateral S1 TFESI. Patient will obtain PCP clearance and COVID PCR test prior. \par -Continue PT 2-3x/week for stretching, strengthening (especially of core muscles), ROM exercises, HEP and modalities PRN including myofascial release, moist heat, and TENS therapy \par -Tylenol PRN with caution given recent transaminitis. Counseled to limit tylenol to 1g daily\par -Avoid NSAIDs given dyspepsia. Follow-up with GI as scheduled.\par -Continue flexeril QHS PRN\par \par -Return for procedure. Patient aware of red flag signs including any changes to their bowel/bladder control, groin numbness or new weakness. Patient knows to seek immediate attention by calling 911 or going to nearest ER if these symptoms appear.

## 2022-03-16 ENCOUNTER — APPOINTMENT (OUTPATIENT)
Dept: FAMILY MEDICINE | Facility: CLINIC | Age: 32
End: 2022-03-16
Payer: COMMERCIAL

## 2022-03-16 VITALS
BODY MASS INDEX: 32.49 KG/M2 | SYSTOLIC BLOOD PRESSURE: 122 MMHG | HEIGHT: 65 IN | RESPIRATION RATE: 16 BRPM | DIASTOLIC BLOOD PRESSURE: 78 MMHG | HEART RATE: 90 BPM | WEIGHT: 195 LBS

## 2022-03-16 PROBLEM — Z01.818 PRE-OP EVALUATION: Status: ACTIVE | Noted: 2022-03-16

## 2022-03-16 PROCEDURE — 99214 OFFICE O/P EST MOD 30 MIN: CPT | Mod: 25

## 2022-03-16 PROCEDURE — 36415 COLL VENOUS BLD VENIPUNCTURE: CPT

## 2022-03-16 NOTE — HISTORY OF PRESENT ILLNESS
[de-identified] : Ms. COURTNEY DEY is a pleasant 31 year old female with PMH of asthma, last crisis 2 years ago, never intubated, PCOS, anxiety and depression on Xanax QHS, and FM or RA? and now recently diagnosed with ankylosing spondylitis following up with rheum, herniated lumbar and cerical discks following up with PMR, pre-DM, HLD. She is here for follow up in her HLD and for medical optimization for epidural steroid injections by PMR. Per conversation with Dr Obrien, patient will only require local anesthesia. \par Patient reports that is able to walk 4 blocks or a flight of stairs without getting chest pain, palpitations s or shortness of breath. \par Denies personal or family history of anesthesia reactions.\par \par Patient is tolerating well the fenofibrate\par She was recently diagnosed with ankylosing spondylitis by her rheumatologist Dr Berg but I don't have those records.\par \par Repeat ALT from 12/14 went down to 49 from 59 and reports ALT from 02/08/2022 by her rheum: ALT 43 and AST was 24 but I don't have those records. She has been on Fenofibrate since 10/2021\par \par \par Psych: Chimma\par Rheum: Rosemary Bhatti [phone]\par Derm: Mathew Fuentes\par Gyn: Annabel Sahu\par Allergist: DA Guallpa in [facility]

## 2022-03-16 NOTE — PHYSICAL EXAM
[Normal Sclera/Conjunctiva] : normal sclera/conjunctiva [EOMI] : extraocular movements intact [Normal Outer Ear/Nose] : the outer ears and nose were normal in appearance [Normal Oropharynx] : the oropharynx was normal [Soft] : abdomen soft [Non Tender] : non-tender [Normal] : affect was normal and insight and judgment were intact

## 2022-03-16 NOTE — PLAN
[FreeTextEntry1] : \par HLD (mainly TG) and overweight\par Lipid panel from 12/2021: TG in target. REpeating today\par Continue fenofibrate 67 mcg, tolerating well\par Lifestyle modifications discussed\par \par Pre-DM\par Last HbA1C: 5.8% on 06/2021, repeating today\par Lifestyle modifications discussed\par Could be related to her PCOS\par \par Transaminitis improving as noted above\par Had neg Hep panel in june/2021\par Has fatty liver per abd CT on 12/14/2021 and abd US on 12/03/2021, which could explain the transaminitis\par Avoid Alcohol, Tylenol\par Repeat ALT from 12/14 went down to 49 from 59 and reports ALT from 02/08/2022 by her rheum: ALT 43 and AST was 24\par Transaminitis improving/sable. Was repeated on 03/01/2022 by GI\par \par Perioperative evaluation\par CBC, CMP from 03/01/2022 reviewed and unremarkable\par \par Ms. DEY is a 31 year female  who comes to the office for preoperative evaluation for epidural steroids injection with local anesthesia. Per Dr Finch PMR they will not use general anesthesia. Patient has greater than 4 METS, is a low perioperative risk for Major adverse cardiac event. Blood work reviewed. Pending PT/INR and PTT ordered by PMR and if unremarkable patient would be medically optimized for this procedure. \par \par During conversation with patient extensive medical records were reviewed including but not limited to hospital records, out patient records, laboratory data and microbiology data \par In addition extensive time was also spent in reviewing diagnostic studies.\par \par Avoid NSAIDs, vitamins and aspirin containing products prior to surgery\par \par Counseling included abnormal lab results, differential diagnoses, treatment options, risks and benefits, lifestyle changes, current condition, medications, and dose adjustments. \par The patient was interactive, attentive, asked questions, and verbalized understanding. \par \par Return to care: within 3 months for back pain/HLD and pre-DM follow up or earlier if needed\par Call or return for any questions

## 2022-03-17 LAB
CHOLEST SERPL-MCNC: 242 MG/DL
ESTIMATED AVERAGE GLUCOSE: 123 MG/DL
HBA1C MFR BLD HPLC: 5.9 %
HDLC SERPL-MCNC: 53 MG/DL
LDLC SERPL CALC-MCNC: 148 MG/DL
NONHDLC SERPL-MCNC: 189 MG/DL
TRIGL SERPL-MCNC: 205 MG/DL

## 2022-03-18 ENCOUNTER — NON-APPOINTMENT (OUTPATIENT)
Age: 32
End: 2022-03-18

## 2022-03-18 RX ORDER — FENOFIBRATE 67 MG/1
67 CAPSULE ORAL
Qty: 90 | Refills: 0 | Status: DISCONTINUED | COMMUNITY
Start: 2021-10-22 | End: 2022-03-18

## 2022-03-21 LAB — SARS-COV-2 N GENE NPH QL NAA+PROBE: NOT DETECTED

## 2022-04-01 ENCOUNTER — NON-APPOINTMENT (OUTPATIENT)
Age: 32
End: 2022-04-01

## 2022-04-01 ENCOUNTER — APPOINTMENT (OUTPATIENT)
Dept: CARDIOLOGY | Facility: CLINIC | Age: 32
End: 2022-04-01
Payer: COMMERCIAL

## 2022-04-01 VITALS
HEART RATE: 85 BPM | SYSTOLIC BLOOD PRESSURE: 130 MMHG | BODY MASS INDEX: 32.49 KG/M2 | OXYGEN SATURATION: 98 % | DIASTOLIC BLOOD PRESSURE: 83 MMHG | WEIGHT: 195 LBS | HEIGHT: 65 IN | RESPIRATION RATE: 15 BRPM | TEMPERATURE: 98 F

## 2022-04-01 PROCEDURE — 99203 OFFICE O/P NEW LOW 30 MIN: CPT

## 2022-04-01 PROCEDURE — 93000 ELECTROCARDIOGRAM COMPLETE: CPT

## 2022-04-01 RX ORDER — DULOXETINE HYDROCHLORIDE 20 MG/1
20 CAPSULE, DELAYED RELEASE PELLETS ORAL
Qty: 30 | Refills: 1 | Status: COMPLETED | COMMUNITY
Start: 2022-03-11 | End: 2022-04-01

## 2022-04-01 RX ORDER — CYCLOBENZAPRINE HYDROCHLORIDE 5 MG/1
5 TABLET, FILM COATED ORAL
Qty: 30 | Refills: 0 | Status: COMPLETED | COMMUNITY
Start: 2022-02-25 | End: 2022-04-01

## 2022-04-01 RX ORDER — ALBUTEROL SULFATE 2.5 MG/3ML
(2.5 MG/3ML) SOLUTION RESPIRATORY (INHALATION)
Refills: 0 | Status: COMPLETED | COMMUNITY
End: 2022-04-01

## 2022-04-01 NOTE — PHYSICAL EXAM
[Well Developed] : well developed [Well Nourished] : well nourished [No Acute Distress] : no acute distress [Normal Conjunctiva] : normal conjunctiva [Normal Venous Pressure] : normal venous pressure [No Carotid Bruit] : no carotid bruit [Normal S1, S2] : normal S1, S2 [No Murmur] : no murmur [No Rub] : no rub [5th Left ICS - MCL] : palpated at the 5th LICS in the midclavicular line [Normal] : normal [No Precordial Heave] : no precordial heave was noted [Normal Rate] : normal [Rhythm Regular] : regular [Normal S1] : normal S1 [Normal S2] : normal S2 [No Gallop] : no gallop heard [S3] : no S3 [S4] : no S4 [II] : a grade 2 [No Pitting Edema] : no pitting edema present [Right Carotid Bruit] : no bruit heard over the right carotid [Left Carotid Bruit] : no bruit heard over the left carotid [Left Femoral Bruit] : no bruit heard over the left femoral artery [Right Femoral Bruit] : no bruit heard over the right femoral artery [2+] : left 2+ [No Abnormalities] : the abdominal aorta was not enlarged and no bruit was heard [Clear Lung Fields] : clear lung fields [Good Air Entry] : good air entry [No Respiratory Distress] : no respiratory distress  [Soft] : abdomen soft [Non Tender] : non-tender [No Masses/organomegaly] : no masses/organomegaly [Normal Bowel Sounds] : normal bowel sounds [Normal Gait] : normal gait [No Edema] : no edema [No Cyanosis] : no cyanosis [No Clubbing] : no clubbing [No Varicosities] : no varicosities [No Rash] : no rash [No Skin Lesions] : no skin lesions [Moves all extremities] : moves all extremities [No Focal Deficits] : no focal deficits [Normal Speech] : normal speech [Alert and Oriented] : alert and oriented [Normal memory] : normal memory

## 2022-04-01 NOTE — DISCUSSION/SUMMARY
[FreeTextEntry1] : This is a 31-year-old white female with past medical history significant for reactive airway disease, dyspepsia, status post recent surgery for right anterior patella tubular ligament repair, hyperlipidemia, and borderline hypertension, who comes in for cardiac/lipid consultation.  She denies chest pain, shortness of breath, dizziness or syncope.\par She has no history of rheumatic fever.  She does not drink excessive caffeine or alcohol.\par Her cardiac risk factors include hyperlipidemia.\par She reports that her father had high triglycerides and high cholesterol.\par A lipid panel done March 16, 2022 demonstrated cholesterol 242, triglycerides 205, HDL of 53, LDL calculated 148 mg/dL, and non-HDL cholesterol 189 mg/dL.\par The patient is on fenofibrate 130 mg, and over-the-counter niacin 50 mg.\par The patient had a normal exercise stress test June 19, 2019.\par Electrocardiogram done April 1, 2022 demonstrate normal sinus rhythm rate of 85 bpm and is otherwise unremarkable.\par I recommend the patient work with our registered dietitian, to achieve adequate dietary change, and weight loss.  The patient does admit that she has gained weight related to her right leg injury and inactivity.  She will also try to start on resistance training with her upper body using bands.\par I have explained to her that with weight loss and dietary change her lipid profile file may improve significantly.\par The patient understands that aerobic exercises must be increased to 40 minutes 4 times per week. A detailed discussion of lifestyle modification was done today. The patient has a good understanding of the diagnosis, and treatment plan. Lifestyle modification was also outlined.

## 2022-04-04 ENCOUNTER — APPOINTMENT (OUTPATIENT)
Dept: ORTHOPEDIC SURGERY | Facility: CLINIC | Age: 32
End: 2022-04-04
Payer: COMMERCIAL

## 2022-04-04 PROCEDURE — 29405 APPL SHORT LEG CAST: CPT

## 2022-04-04 PROCEDURE — 99024 POSTOP FOLLOW-UP VISIT: CPT

## 2022-04-04 RX ORDER — CYCLOBENZAPRINE HYDROCHLORIDE 5 MG/1
5 TABLET, FILM COATED ORAL
Qty: 10 | Refills: 0 | Status: COMPLETED | COMMUNITY
Start: 2021-12-17 | End: 2022-04-04

## 2022-04-04 NOTE — PHYSICAL EXAM
[Right] : right foot and ankle [4___] : eversion 4[unfilled]/5 [2+] : posterior tibialis pulse: 2+ [Normal] : saphenous nerve sensation normal [] : no pain when stressing lateral tarsal metatarsal joint [de-identified] : plantar flexion 30 degrees [de-identified] : inversion 10 degrees [de-identified] : eversion 5 degrees [TWNoteComboBox7] : dorsiflexion 0 degrees

## 2022-04-04 NOTE — ASSESSMENT
[FreeTextEntry1] : Patient is doing well. Sutures were removed today.\par Patient was instructed to be strictly nwb to the right foot.\par A well padded cast was applied.  Patient was instructed on proper cast management including keeping it dry and not sticking anything down the cast.  Patient was told that if pain significantly increases or toes turn numb and/or blue and simple elevation does not allow symptoms to improve in a few minutes, to call the office immediately or go to the ER.  All questions were answered.\par \par \par Cast to be removed next visit

## 2022-04-04 NOTE — HISTORY OF PRESENT ILLNESS
[Right Leg] : right leg [3] : 3 [1] : 2 [Dull/Aching] : dull/aching [Localized] : localized [Occasional] : occasional [Rest] : rest [de-identified] : Pt. presents for f/u RT ankle s/p arthroscopy with extensive debridement; Brostrom-Ogden lateral ligament reconstruction 3/22/22 .Doing well. NWB in splint, using crutches. Post-op pain well managed. No fevers, chills, sweats reported.  [] : no [FreeTextEntry1] : ankle [de-identified] : 3/22/22

## 2022-04-11 ENCOUNTER — APPOINTMENT (OUTPATIENT)
Dept: CARDIOLOGY | Facility: CLINIC | Age: 32
End: 2022-04-11
Payer: SELF-PAY

## 2022-04-11 PROCEDURE — 97802 MEDICAL NUTRITION INDIV IN: CPT | Mod: 95

## 2022-04-14 ENCOUNTER — APPOINTMENT (OUTPATIENT)
Dept: GASTROENTEROLOGY | Facility: CLINIC | Age: 32
End: 2022-04-14

## 2022-04-15 ENCOUNTER — APPOINTMENT (OUTPATIENT)
Dept: FAMILY MEDICINE | Facility: CLINIC | Age: 32
End: 2022-04-15
Payer: COMMERCIAL

## 2022-04-15 ENCOUNTER — NON-APPOINTMENT (OUTPATIENT)
Age: 32
End: 2022-04-15

## 2022-04-15 DIAGNOSIS — J01.90 ACUTE SINUSITIS, UNSPECIFIED: ICD-10-CM

## 2022-04-15 PROCEDURE — 99442: CPT

## 2022-04-15 NOTE — PLAN
[FreeTextEntry1] : \par In regards Acute sinusitis: \par -No signs of respiratory distress at this time. 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 WIll follow up with ENT on 04/19/2022\par -Sending steroids, she has asthma and can't take NSAIDs due to upcoming back procedure.\par -I advised the patient to take acetaminophen with meals and as needed for pain/fever, to take chicken soup and increase the oral hydration up to 1 gallon of fluids/day unless there is a contraindication to do so. Also to monitor for worsening symptoms including but not limited to uncontrolled fever, shortness of breath, worsening sore throat, weakness and to go to the Emergency department if the symptoms worsen. \par \par Return to care: within 2 months for follow up or earlier if needed\par Call or return for any questions

## 2022-04-15 NOTE — HISTORY OF PRESENT ILLNESS
[FreeTextEntry8] : Ms. COURTNEY DEY is a pleasant 31 year old female with PMH of asthma, last crisis 2 years ago, never intubated, PCOS, anxiety and depression on Xanax QHS and FM or RA? following up with rheum, pre-DM, HLD not on medications who is being seen via telephone visit for a sinus headache that she reports that has every strength. She has had it for 3 days, tried OTC Mucinex, pseudoephedrine  and tylenol but is not improving. She also has nighttime runny nose, postnasal drip, sore itchiness but not pain. Denies fever, cough, sick contacts, recent travels, shortness of breath, nausea, vomiting, diarrhea. Denies increased use of her albuterol inhaler\par She will see ENT on 04/19/2022\par She will have a back injection on 04/18/2022 with Dr Obrien\par Has a negative home test for COVID-19 and has a pending PCR\par \par Patient is COVID-19 vaccinated, had COVID-19 last time on dec/2022\par \par Psych: Chimma\par Rheum: Dr Wyatt and Dr Mendez\par Derm: Mathew Fuentes\par Gyn: Annabel Sahu\par Allergist: DA Guallpa in [facility] \par

## 2022-04-17 LAB
APTT BLD: 26.2 SEC
INR PPP: 1.05 RATIO
PT BLD: 12.4 SEC

## 2022-04-24 ENCOUNTER — TRANSCRIPTION ENCOUNTER (OUTPATIENT)
Age: 32
End: 2022-04-24

## 2022-04-25 ENCOUNTER — OUTPATIENT (OUTPATIENT)
Dept: OUTPATIENT SERVICES | Facility: HOSPITAL | Age: 32
LOS: 1 days | End: 2022-04-25
Payer: COMMERCIAL

## 2022-04-25 ENCOUNTER — APPOINTMENT (OUTPATIENT)
Dept: PHYSICAL MEDICINE AND REHAB | Facility: GI CENTER | Age: 32
End: 2022-04-25
Payer: COMMERCIAL

## 2022-04-25 DIAGNOSIS — M54.16 RADICULOPATHY, LUMBAR REGION: ICD-10-CM

## 2022-04-25 DIAGNOSIS — M51.26 OTHER INTERVERTEBRAL DISC DISPLACEMENT, LUMBAR REGION: ICD-10-CM

## 2022-04-25 PROCEDURE — 76000 FLUOROSCOPY <1 HR PHYS/QHP: CPT

## 2022-04-25 PROCEDURE — 64483 NJX AA&/STRD TFRM EPI L/S 1: CPT | Mod: 50

## 2022-04-27 ENCOUNTER — APPOINTMENT (OUTPATIENT)
Dept: ORTHOPEDIC SURGERY | Facility: CLINIC | Age: 32
End: 2022-04-27
Payer: COMMERCIAL

## 2022-04-27 VITALS — WEIGHT: 190 LBS | BODY MASS INDEX: 31.65 KG/M2 | HEIGHT: 65 IN

## 2022-04-27 DIAGNOSIS — S93.401D SPRAIN OF UNSPECIFIED LIGAMENT OF RIGHT ANKLE, SUBSEQUENT ENCOUNTER: ICD-10-CM

## 2022-04-27 PROCEDURE — 73610 X-RAY EXAM OF ANKLE: CPT | Mod: RT

## 2022-04-27 PROCEDURE — 99024 POSTOP FOLLOW-UP VISIT: CPT

## 2022-04-27 PROCEDURE — L1902: CPT

## 2022-04-27 NOTE — HISTORY OF PRESENT ILLNESS
[Right Leg] : right leg [Dull/Aching] : dull/aching [Localized] : localized [Occasional] : occasional [Rest] : rest [Sitting] : sitting [Standing] : standing [Walking] : walking [Stairs] : stairs [Sudden] : sudden [5] : 5 [Throbbing] : throbbing [Not working due to injury] : Work status: not working due to injury [de-identified] : Pt. returns for f/u RT ankle s/p arthroscopy with extensive debridement; Brostrom-Ogden lateral ligament reconstruction 3/22/22. She has been NWB in [facility], using crutches. Reports she fell on 4/26/22 after slipping on blanket that was on the floor. Her pain increased, but it has improved.  [] : This patient has had an injection before: no [FreeTextEntry1] : R ankle [FreeTextEntry5] : pt is a 31 y/o RHD fem in for eval of the R ankle s/p Ankle SX pt states she fell and heard a pop. pt states pain started on 4/26/22. 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

## 2022-04-27 NOTE — ASSESSMENT
[FreeTextEntry1] : SLC removed. \par WBAT in supportive footwear.\par Begin PT.\par Ice to affected area.\par

## 2022-04-27 NOTE — PHYSICAL EXAM
[4___] : inversion 4[unfilled]/5 [2+] : posterior tibialis pulse: 2+ [Normal] : saphenous nerve sensation normal [NL (40)] : plantar flexion 40 degrees [3___] : eversion 3[unfilled]/5 [Right] : right ankle [There are no fractures, subluxations or dislocations. No significant abnormalities are seen] : There are no fractures, subluxations or dislocations. No significant abnormalities are seen [] : no pain when stressing lateral tarsal metatarsal joint [de-identified] : plantar flexion 30 degrees [de-identified] : inversion 15 degrees [de-identified] : eversion 5 degrees [TWNoteComboBox7] : dorsiflexion 10 degrees

## 2022-05-04 ENCOUNTER — APPOINTMENT (OUTPATIENT)
Dept: ORTHOPEDIC SURGERY | Facility: CLINIC | Age: 32
End: 2022-05-04

## 2022-05-09 ENCOUNTER — APPOINTMENT (OUTPATIENT)
Dept: CARDIOLOGY | Facility: CLINIC | Age: 32
End: 2022-05-09
Payer: SELF-PAY

## 2022-05-09 ENCOUNTER — RX RENEWAL (OUTPATIENT)
Age: 32
End: 2022-05-09

## 2022-05-09 PROCEDURE — 97803 MED NUTRITION INDIV SUBSEQ: CPT | Mod: 95

## 2022-05-11 ENCOUNTER — APPOINTMENT (OUTPATIENT)
Dept: PHYSICAL MEDICINE AND REHAB | Facility: CLINIC | Age: 32
End: 2022-05-11
Payer: OTHER MISCELLANEOUS

## 2022-05-11 VITALS
WEIGHT: 195 LBS | RESPIRATION RATE: 12 BRPM | SYSTOLIC BLOOD PRESSURE: 151 MMHG | DIASTOLIC BLOOD PRESSURE: 103 MMHG | HEIGHT: 65 IN | HEART RATE: 105 BPM | BODY MASS INDEX: 32.49 KG/M2

## 2022-05-11 DIAGNOSIS — M51.9 UNSPECIFIED THORACIC, THORACOLUMBAR AND LUMBOSACRAL INTERVERTEBRAL DISC DISORDER: ICD-10-CM

## 2022-05-11 PROCEDURE — 99072 ADDL SUPL MATRL&STAF TM PHE: CPT

## 2022-05-11 PROCEDURE — 99213 OFFICE O/P EST LOW 20 MIN: CPT | Mod: GC

## 2022-05-11 NOTE — ASSESSMENT
[FreeTextEntry1] : Ms. COURTNEY DEY is a 31 year old female who presented  with acute on chronic low back pain following an injury moving a patient at work.  She also reported subjective RLE>LLE weakness and pain shooting down the posterior right>leg leg. Suspected lumbar radiculopathy 2/2 acute disc herniation, as confirmed on MRI. She is now s/p an AUBREY with significant (85%) relief.  Denies any red flag signs.\par Will recommend:\par - Start PT 2-3x/week for stretching, strengthening (especially of core muscles), ROM exercises, HEP and modalities PRN including myofascial release, moist heat, and TENS therapy \par -Tylenol PRN with caution given recent transaminitis. \par -Avoid NSAIDs given dyspepsia. Follow-up with GI as scheduled.\par - She is no longer needing to take Flexeril. \par \par RTC in 6 weeks. Patient aware of red flag signs including any changes to their bowel/bladder control, groin numbness or new weakness. Patient knows to seek immediate attention by calling 911 or going to nearest ER if these symptoms appear.

## 2022-05-11 NOTE — HISTORY OF PRESENT ILLNESS
[FreeTextEntry1] : Ms. COURTNEY DEY is a 32 year old  female who presents for follow up. She is s/p a bilateral S1 TFESI on 4/25/22.She states that she is having improved back spasms and her tailbone pain is much improved, though still occasionally present. She states she has 85% relief of her back pain. No other new or changed symptoms.\par \par Location: R>L low back/buttock\par Onset: acutely worsened 2/9/22\par Provocation/Palliative: Improved with prednisone taper, heat worse with sitting/standing for long periods. \par Quality: dull constant ache and spasm, with occasional sharp pain in back and leg\par Radiation: through R>L buttock to posterior leg/calf\par Severity: 8/10 at worst, now minimal\par Timing: constant throughout the day, worse at times, but much improved after AUBREY. \par \par No bowel/bladder changes. No groin numbness. \par \par

## 2022-05-11 NOTE — PHYSICAL EXAM
[FreeTextEntry1] : PE:\par Constitutional: In NAD, calm and cooperative\par MSK (Back)\par 	Inspection: no gross swelling identified. Injection sites are clean without erythema or swelling\par 	Palpation: No tenderness of the bilateral lower lumbar paraspinals\par 	ROM: Pain at end lumbar extension>flexion (improved)\par 	Strength: 5/5 strength in bilateral lower extremities\par 	Sensation: Intact to light touch in bilateral lower extremities\par Special tests: \par SLR: positive bilaterally, but improved\par Facet loading: equivocal on right (pain L>R)

## 2022-05-12 ENCOUNTER — FORM ENCOUNTER (OUTPATIENT)
Age: 32
End: 2022-05-12

## 2022-05-12 ENCOUNTER — APPOINTMENT (OUTPATIENT)
Dept: CARDIOLOGY | Facility: CLINIC | Age: 32
End: 2022-05-12
Payer: COMMERCIAL

## 2022-05-12 ENCOUNTER — NON-APPOINTMENT (OUTPATIENT)
Age: 32
End: 2022-05-12

## 2022-05-12 ENCOUNTER — APPOINTMENT (OUTPATIENT)
Dept: FAMILY MEDICINE | Facility: CLINIC | Age: 32
End: 2022-05-12
Payer: COMMERCIAL

## 2022-05-12 VITALS
HEIGHT: 65 IN | HEART RATE: 108 BPM | WEIGHT: 195 LBS | SYSTOLIC BLOOD PRESSURE: 128 MMHG | RESPIRATION RATE: 16 BRPM | DIASTOLIC BLOOD PRESSURE: 110 MMHG | BODY MASS INDEX: 32.49 KG/M2

## 2022-05-12 VITALS
TEMPERATURE: 98.6 F | HEIGHT: 65 IN | DIASTOLIC BLOOD PRESSURE: 100 MMHG | OXYGEN SATURATION: 97 % | WEIGHT: 200 LBS | HEART RATE: 100 BPM | BODY MASS INDEX: 33.32 KG/M2 | SYSTOLIC BLOOD PRESSURE: 150 MMHG

## 2022-05-12 DIAGNOSIS — Z83.42 FAMILY HISTORY OF FAMILIAL HYPERCHOLESTEROLEMIA: ICD-10-CM

## 2022-05-12 DIAGNOSIS — Z83.3 FAMILY HISTORY OF DIABETES MELLITUS: ICD-10-CM

## 2022-05-12 DIAGNOSIS — Z13.29 ENCOUNTER FOR SCREENING FOR OTHER SUSPECTED ENDOCRINE DISORDER: ICD-10-CM

## 2022-05-12 DIAGNOSIS — Z82.49 FAMILY HISTORY OF ISCHEMIC HEART DISEASE AND OTHER DISEASES OF THE CIRCULATORY SYSTEM: ICD-10-CM

## 2022-05-12 PROCEDURE — 99214 OFFICE O/P EST MOD 30 MIN: CPT

## 2022-05-12 PROCEDURE — 93000 ELECTROCARDIOGRAM COMPLETE: CPT

## 2022-05-12 RX ORDER — BUDESONIDE AND FORMOTEROL FUMARATE DIHYDRATE 160; 4.5 UG/1; UG/1
AEROSOL RESPIRATORY (INHALATION)
Refills: 0 | Status: DISCONTINUED | COMMUNITY
End: 2022-05-12

## 2022-05-12 RX ORDER — PREDNISONE 20 MG/1
20 TABLET ORAL DAILY
Qty: 5 | Refills: 0 | Status: DISCONTINUED | COMMUNITY
Start: 2022-04-15 | End: 2022-05-12

## 2022-05-12 RX ORDER — OMEPRAZOLE 40 MG/1
40 CAPSULE, DELAYED RELEASE ORAL
Qty: 30 | Refills: 3 | Status: DISCONTINUED | COMMUNITY
Start: 2022-03-01 | End: 2022-05-12

## 2022-05-12 RX ORDER — AMOXICILLIN AND CLAVULANATE POTASSIUM 875; 125 MG/1; MG/1
875-125 TABLET, COATED ORAL
Qty: 14 | Refills: 0 | Status: DISCONTINUED | COMMUNITY
Start: 2022-04-15 | End: 2022-05-12

## 2022-05-12 RX ORDER — NIACIN 500 MG/1
500 TABLET, EXTENDED RELEASE ORAL
Qty: 180 | Refills: 1 | Status: DISCONTINUED | COMMUNITY
Start: 2022-04-04 | End: 2022-05-12

## 2022-05-12 RX ORDER — PREDNISONE 10 MG/1
10 TABLET ORAL
Qty: 45 | Refills: 0 | Status: DISCONTINUED | COMMUNITY
Start: 2022-02-14 | End: 2022-05-12

## 2022-05-12 NOTE — PLAN
[FreeTextEntry1] : \par HTN, new diagnosis\par Her diastolic blood pressure is elevated. \par EKG reviewed: no acute ST or T-wave abnormalities, patient will see cardio today\par Ordering CBC, CMP, urine metanephrines, kidney Duplex. Will need brianne rule out secondary causes of HTN. She may need to stop aldactone before the urine metanephrines test. \par Offered to start anti-HTN, patient rather to see cardio, Dr Melton today. Would consider anti-tavia adrenergics or calcium pam blockers.\par Alarm symptoms discussed  including but not limited to headaches, chest pain, shortness of breath and I advised the patient to go to the emergency department if these symptoms appear. \par \par Per records, has left adrenal nodule and cyst on abd MRI and CT scans (1.3cm), ordered by her OB/Gyn Dr Sahu on 2020. Repeat CT scan from dec/2021 in the [facility] reports small left adrenal nodule\par Doing an e-consult with endocrinology, patient gives consent\par \par Return to care: within 2 weeks for follow up or earlier if needed\par Call or return for any questions

## 2022-05-12 NOTE — PHYSICAL EXAM
[Normal Sclera/Conjunctiva] : normal sclera/conjunctiva [EOMI] : extraocular movements intact [Normal] : normal rate, regular rhythm, normal S1 and S2 and no murmur heard

## 2022-05-12 NOTE — HISTORY OF PRESENT ILLNESS
[FreeTextEntry1] : follow up, back pain [de-identified] : Ms. COURTNEY DEY is a pleasant 31 year old female with PMH of asthma, last crisis 2 years ago, never intubated, PCOS, anxiety and depression on Xanax QHS, and FM or RA? and now recently diagnosed with ankylosing spondylitis following up with rheum, herniated lumbar and cervical disks following up with PMR, pre-DM, HLD. She is here for follow up in her blood pressure. Reports that her blood pressure was found elevated during her foot surgery on march/22/2022 but they thought it was because she was anxious. 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 She has felt palpations and sweaty at times but not at this time. Denies chest pain, shortness of breath She was told in the past that has a left adrenal nodule, found by her OB/gyn on 2020. She had an MRI back then and a CT scan on dec/2021 at [facility] (in [place]) which reported it unchanged \par \par \par \par Psych: Chimma\par Rheum: Rosemary Bhatti [phone]\par Derm: Mathew Fuentes\par Gyn: Annabel Sahu\par Allergist: DA Guallpa in [facility]

## 2022-05-15 ENCOUNTER — INPATIENT (INPATIENT)
Facility: HOSPITAL | Age: 32
LOS: 2 days | Discharge: HOME | DRG: 305 | End: 2022-05-18
Attending: HOSPITALIST | Admitting: HOSPITALIST
Payer: COMMERCIAL

## 2022-05-15 ENCOUNTER — NON-APPOINTMENT (OUTPATIENT)
Age: 32
End: 2022-05-15

## 2022-05-15 VITALS
HEART RATE: 131 BPM | DIASTOLIC BLOOD PRESSURE: 127 MMHG | RESPIRATION RATE: 20 BRPM | TEMPERATURE: 99 F | WEIGHT: 199.96 LBS | SYSTOLIC BLOOD PRESSURE: 173 MMHG | HEIGHT: 65 IN | OXYGEN SATURATION: 97 %

## 2022-05-15 DIAGNOSIS — I10 ESSENTIAL (PRIMARY) HYPERTENSION: ICD-10-CM

## 2022-05-15 DIAGNOSIS — I16.1 HYPERTENSIVE EMERGENCY: ICD-10-CM

## 2022-05-15 PROBLEM — Z82.49 FAMILY HISTORY OF HYPERTENSION: Status: ACTIVE | Noted: 2019-06-06

## 2022-05-15 PROBLEM — Z83.42 FAMILY HISTORY OF HYPERCHOLESTEROLEMIA: Status: ACTIVE | Noted: 2019-06-13

## 2022-05-15 PROBLEM — Z83.3 FAMILY HISTORY OF DIABETES MELLITUS: Status: ACTIVE | Noted: 2019-06-06

## 2022-05-15 LAB
ALBUMIN SERPL ELPH-MCNC: 4.3 G/DL — SIGNIFICANT CHANGE UP (ref 3.3–5.2)
ALBUMIN SERPL ELPH-MCNC: 4.6 G/DL
ALP BLD-CCNC: 121 U/L
ALP SERPL-CCNC: 119 U/L — SIGNIFICANT CHANGE UP (ref 40–120)
ALT FLD-CCNC: 148 U/L — HIGH
ALT SERPL-CCNC: 106 U/L
AMPHET UR-MCNC: NEGATIVE — SIGNIFICANT CHANGE UP
ANION GAP SERPL CALC-SCNC: 16 MMOL/L
ANION GAP SERPL CALC-SCNC: 17 MMOL/L — SIGNIFICANT CHANGE UP (ref 5–17)
AST SERPL-CCNC: 106 U/L — HIGH
AST SERPL-CCNC: 67 U/L
BARBITURATES UR SCN-MCNC: NEGATIVE — SIGNIFICANT CHANGE UP
BASOPHILS # BLD AUTO: 0.09 K/UL — SIGNIFICANT CHANGE UP (ref 0–0.2)
BASOPHILS # BLD AUTO: 0.1 K/UL
BASOPHILS NFR BLD AUTO: 0.9 % — SIGNIFICANT CHANGE UP (ref 0–2)
BASOPHILS NFR BLD AUTO: 1.1 %
BENZODIAZ UR-MCNC: NEGATIVE — SIGNIFICANT CHANGE UP
BILIRUB SERPL-MCNC: 0.4 MG/DL
BILIRUB SERPL-MCNC: 0.4 MG/DL — SIGNIFICANT CHANGE UP (ref 0.4–2)
BUN SERPL-MCNC: 10.5 MG/DL — SIGNIFICANT CHANGE UP (ref 8–20)
BUN SERPL-MCNC: 9 MG/DL
CALCIUM SERPL-MCNC: 9.5 MG/DL — SIGNIFICANT CHANGE UP (ref 8.6–10.2)
CALCIUM SERPL-MCNC: 9.9 MG/DL
CHLORIDE SERPL-SCNC: 102 MMOL/L
CHLORIDE SERPL-SCNC: 98 MMOL/L — SIGNIFICANT CHANGE UP (ref 98–107)
CO2 SERPL-SCNC: 21 MMOL/L — LOW (ref 22–29)
CO2 SERPL-SCNC: 25 MMOL/L
COCAINE METAB.OTHER UR-MCNC: NEGATIVE — SIGNIFICANT CHANGE UP
CREAT SERPL-MCNC: 0.54 MG/DL — SIGNIFICANT CHANGE UP (ref 0.5–1.3)
CREAT SERPL-MCNC: 0.63 MG/DL
D DIMER BLD IA.RAPID-MCNC: <150 NG/ML DDU — SIGNIFICANT CHANGE UP
EGFR: 122 ML/MIN/1.73M2
EGFR: 127 ML/MIN/1.73M2 — SIGNIFICANT CHANGE UP
EOSINOPHIL # BLD AUTO: 0.15 K/UL — SIGNIFICANT CHANGE UP (ref 0–0.5)
EOSINOPHIL # BLD AUTO: 0.32 K/UL
EOSINOPHIL NFR BLD AUTO: 1.5 % — SIGNIFICANT CHANGE UP (ref 0–6)
EOSINOPHIL NFR BLD AUTO: 3.6 %
FLUAV AG NPH QL: SIGNIFICANT CHANGE UP
FLUBV AG NPH QL: SIGNIFICANT CHANGE UP
GLUCOSE SERPL-MCNC: 138 MG/DL — HIGH (ref 70–99)
GLUCOSE SERPL-MCNC: 79 MG/DL
HCG SERPL-ACNC: <4 MIU/ML — SIGNIFICANT CHANGE UP
HCT VFR BLD CALC: 37.5 % — SIGNIFICANT CHANGE UP (ref 34.5–45)
HCT VFR BLD CALC: 37.7 %
HGB BLD-MCNC: 12.5 G/DL
HGB BLD-MCNC: 12.7 G/DL — SIGNIFICANT CHANGE UP (ref 11.5–15.5)
IMM GRANULOCYTES NFR BLD AUTO: 0.4 %
IMM GRANULOCYTES NFR BLD AUTO: 0.4 % — SIGNIFICANT CHANGE UP (ref 0–1.5)
INR BLD: 1.13 RATIO — SIGNIFICANT CHANGE UP (ref 0.88–1.16)
LYMPHOCYTES # BLD AUTO: 1.57 K/UL — SIGNIFICANT CHANGE UP (ref 1–3.3)
LYMPHOCYTES # BLD AUTO: 15.5 % — SIGNIFICANT CHANGE UP (ref 13–44)
LYMPHOCYTES # BLD AUTO: 2.37 K/UL
LYMPHOCYTES NFR BLD AUTO: 26.4 %
MAGNESIUM SERPL-MCNC: 1.8 MG/DL — SIGNIFICANT CHANGE UP (ref 1.8–2.6)
MAN DIFF?: NORMAL
MCHC RBC-ENTMCNC: 29.1 PG — SIGNIFICANT CHANGE UP (ref 27–34)
MCHC RBC-ENTMCNC: 29.9 PG
MCHC RBC-ENTMCNC: 33.2 GM/DL
MCHC RBC-ENTMCNC: 33.9 GM/DL — SIGNIFICANT CHANGE UP (ref 32–36)
MCV RBC AUTO: 86 FL — SIGNIFICANT CHANGE UP (ref 80–100)
MCV RBC AUTO: 90.2 FL
METHADONE UR-MCNC: NEGATIVE — SIGNIFICANT CHANGE UP
MONOCYTES # BLD AUTO: 0.84 K/UL
MONOCYTES # BLD AUTO: 1 K/UL — HIGH (ref 0–0.9)
MONOCYTES NFR BLD AUTO: 9.4 %
MONOCYTES NFR BLD AUTO: 9.9 % — SIGNIFICANT CHANGE UP (ref 2–14)
NEUTROPHILS # BLD AUTO: 5.3 K/UL
NEUTROPHILS # BLD AUTO: 7.25 K/UL — SIGNIFICANT CHANGE UP (ref 1.8–7.4)
NEUTROPHILS NFR BLD AUTO: 59.1 %
NEUTROPHILS NFR BLD AUTO: 71.8 % — SIGNIFICANT CHANGE UP (ref 43–77)
OPIATES UR-MCNC: NEGATIVE — SIGNIFICANT CHANGE UP
PCP SPEC-MCNC: SIGNIFICANT CHANGE UP
PCP UR-MCNC: NEGATIVE — SIGNIFICANT CHANGE UP
PLATELET # BLD AUTO: 408 K/UL
PLATELET # BLD AUTO: 451 K/UL — HIGH (ref 150–400)
POTASSIUM SERPL-MCNC: 3.7 MMOL/L — SIGNIFICANT CHANGE UP (ref 3.5–5.3)
POTASSIUM SERPL-SCNC: 3.7 MMOL/L — SIGNIFICANT CHANGE UP (ref 3.5–5.3)
POTASSIUM SERPL-SCNC: 4.2 MMOL/L
PROT SERPL-MCNC: 7.7 G/DL
PROT SERPL-MCNC: 8 G/DL — SIGNIFICANT CHANGE UP (ref 6.6–8.7)
PROTHROM AB SERPL-ACNC: 13.1 SEC — SIGNIFICANT CHANGE UP (ref 10.5–13.4)
RBC # BLD: 4.18 M/UL
RBC # BLD: 4.36 M/UL — SIGNIFICANT CHANGE UP (ref 3.8–5.2)
RBC # FLD: 12.4 % — SIGNIFICANT CHANGE UP (ref 10.3–14.5)
RBC # FLD: 13.2 %
RSV RNA NPH QL NAA+NON-PROBE: SIGNIFICANT CHANGE UP
SARS-COV-2 RNA SPEC QL NAA+PROBE: SIGNIFICANT CHANGE UP
SODIUM SERPL-SCNC: 136 MMOL/L — SIGNIFICANT CHANGE UP (ref 135–145)
SODIUM SERPL-SCNC: 142 MMOL/L
T3 SERPL-MCNC: 148 NG/DL — SIGNIFICANT CHANGE UP (ref 80–200)
T4 AB SER-ACNC: 8.5 UG/DL — SIGNIFICANT CHANGE UP (ref 4.5–12)
THC UR QL: NEGATIVE — SIGNIFICANT CHANGE UP
TROPONIN T SERPL-MCNC: <0.01 NG/ML — SIGNIFICANT CHANGE UP (ref 0–0.06)
TSH SERPL-ACNC: 1.13 UIU/ML
TSH SERPL-MCNC: 0.51 UIU/ML — SIGNIFICANT CHANGE UP (ref 0.27–4.2)
WBC # BLD: 10.1 K/UL — SIGNIFICANT CHANGE UP (ref 3.8–10.5)
WBC # FLD AUTO: 10.1 K/UL — SIGNIFICANT CHANGE UP (ref 3.8–10.5)
WBC # FLD AUTO: 8.97 K/UL

## 2022-05-15 PROCEDURE — 93010 ELECTROCARDIOGRAM REPORT: CPT

## 2022-05-15 PROCEDURE — 99223 1ST HOSP IP/OBS HIGH 75: CPT

## 2022-05-15 PROCEDURE — 71045 X-RAY EXAM CHEST 1 VIEW: CPT | Mod: 26

## 2022-05-15 PROCEDURE — 76705 ECHO EXAM OF ABDOMEN: CPT | Mod: 26,59

## 2022-05-15 PROCEDURE — 99285 EMERGENCY DEPT VISIT HI MDM: CPT

## 2022-05-15 PROCEDURE — 93975 VASCULAR STUDY: CPT | Mod: 26

## 2022-05-15 RX ORDER — ADALIMUMAB 40MG/0.8ML
0 KIT SUBCUTANEOUS
Qty: 0 | Refills: 0 | DISCHARGE

## 2022-05-15 RX ORDER — DULOXETINE HYDROCHLORIDE 30 MG/1
1 CAPSULE, DELAYED RELEASE ORAL
Qty: 0 | Refills: 0 | DISCHARGE

## 2022-05-15 RX ORDER — ALPRAZOLAM 0.25 MG
1 TABLET ORAL AT BEDTIME
Refills: 0 | Status: DISCONTINUED | OUTPATIENT
Start: 2022-05-15 | End: 2022-05-18

## 2022-05-15 RX ORDER — LOSARTAN POTASSIUM 100 MG/1
25 TABLET, FILM COATED ORAL DAILY
Refills: 0 | Status: DISCONTINUED | OUTPATIENT
Start: 2022-05-15 | End: 2022-05-17

## 2022-05-15 RX ORDER — PANTOPRAZOLE SODIUM 20 MG/1
40 TABLET, DELAYED RELEASE ORAL
Refills: 0 | Status: DISCONTINUED | OUTPATIENT
Start: 2022-05-15 | End: 2022-05-18

## 2022-05-15 RX ORDER — AMLODIPINE BESYLATE 2.5 MG/1
10 TABLET ORAL DAILY
Refills: 0 | Status: DISCONTINUED | OUTPATIENT
Start: 2022-05-15 | End: 2022-05-17

## 2022-05-15 RX ORDER — MONTELUKAST 4 MG/1
10 TABLET, CHEWABLE ORAL DAILY
Refills: 0 | Status: DISCONTINUED | OUTPATIENT
Start: 2022-05-15 | End: 2022-05-16

## 2022-05-15 RX ORDER — CYCLOBENZAPRINE HYDROCHLORIDE 10 MG/1
5 TABLET, FILM COATED ORAL THREE TIMES A DAY
Refills: 0 | Status: DISCONTINUED | OUTPATIENT
Start: 2022-05-15 | End: 2022-05-18

## 2022-05-15 RX ORDER — DULOXETINE HYDROCHLORIDE 30 MG/1
30 CAPSULE, DELAYED RELEASE ORAL DAILY
Refills: 0 | Status: DISCONTINUED | OUTPATIENT
Start: 2022-05-15 | End: 2022-05-16

## 2022-05-15 RX ORDER — LORATADINE 10 MG/1
10 TABLET ORAL DAILY
Refills: 0 | Status: DISCONTINUED | OUTPATIENT
Start: 2022-05-15 | End: 2022-05-16

## 2022-05-15 RX ORDER — HYDRALAZINE HCL 50 MG
10 TABLET ORAL EVERY 6 HOURS
Refills: 0 | Status: DISCONTINUED | OUTPATIENT
Start: 2022-05-15 | End: 2022-05-18

## 2022-05-15 RX ADMIN — MONTELUKAST 10 MILLIGRAM(S): 4 TABLET, CHEWABLE ORAL at 21:17

## 2022-05-15 RX ADMIN — LORATADINE 10 MILLIGRAM(S): 10 TABLET ORAL at 21:17

## 2022-05-15 RX ADMIN — Medication 1 MILLIGRAM(S): at 21:17

## 2022-05-15 RX ADMIN — DULOXETINE HYDROCHLORIDE 30 MILLIGRAM(S): 30 CAPSULE, DELAYED RELEASE ORAL at 21:17

## 2022-05-15 RX ADMIN — CYCLOBENZAPRINE HYDROCHLORIDE 5 MILLIGRAM(S): 10 TABLET, FILM COATED ORAL at 21:17

## 2022-05-15 NOTE — H&P ADULT - NSHPPHYSICALEXAM_GEN_ALL_CORE
Vital Signs Last 24 Hrs  T(C): 37.1 (15 May 2022 12:56), Max: 37.1 (15 May 2022 12:56)  T(F): 98.8 (15 May 2022 12:56), Max: 98.8 (15 May 2022 12:56)  HR: 92 (15 May 2022 15:10) (92 - 131)  BP: 149/94 (15 May 2022 15:10) (149/94 - 173/127)  BP(mean): --  RR: 18 (15 May 2022 15:10) (18 - 20)  SpO2: 98% (15 May 2022 15:10) (97% - 98%)    General appearance: No acute distress, Awake, Alert  HEENT: Normocephalic, Atraumatic, Conjunctiva clear, EOMI  Neck: Supple, No JVD, No tenderness  Lungs: Breath sound equal bilaterally, No wheezes, No rales  Cardiovascular: S1S2, Regular rhythm  Abdomen: Soft, Nontender, Nondistended, No guarding/rebound, Positive bowel sounds  Extremities: No clubbing, No cyanosis, No edema, No calf tenderness  Neuro: Strength equal bilaterally, No tremors  Psychiatric: Appropriate mood, Normal affect

## 2022-05-15 NOTE — ED PROVIDER NOTE - NS ED ROS FT
No fever/chills   No photophobia/eye pain/changes in visio,   No ear pain/sore throat/dysphagia   No chest pain +palpitations  No SOB/cough/wheeze/stridor   No abdominal pain, No N/V/D  No dysuria/frequency/discharge   No neck/back pain,   No rash  No changes in neurological status/function.

## 2022-05-15 NOTE — ED PROVIDER NOTE - OBJECTIVE STATEMENT
Pt is a 31 yo F co palpitations.  Pt states that a few months ago she had ankle surgery at that time they noted she was hypertensive.  Pt has been consistently elevated and is now having paroxysms of palpitations associated with htn, tachycardia and diaphoresis.  Pt states that she followed up with Dr. Vegas this past week and was started on amlodipine but the symptoms have been much worse today and yesterday.  no n/v. no fever/chills. no sob. no other complaints.    Pt states that she was diagnosed with adrenal nodule in 2020.  Pt states that she turned in the urine yesterday for 24 hour catecholamine but doesn't have the results yet.

## 2022-05-15 NOTE — PATIENT PROFILE ADULT - FALL HARM RISK - HARM RISK INTERVENTIONS

## 2022-05-15 NOTE — PHYSICAL EXAM
[Normal] : soft, non-tender, no masses/organomegaly, normal bowel sounds [No Edema] : no edema [Normal Gait] : normal gait [No Cyanosis] : no cyanosis [No Rash] : no rash [Moves all extremities] : moves all extremities [No Focal Deficits] : no focal deficits [Alert and Oriented] : alert and oriented [Normal memory] : normal memory [de-identified] : bilateral upper ext pulses are equal, no radiofemoral delay.

## 2022-05-15 NOTE — ED PROVIDER NOTE - CLINICAL SUMMARY MEDICAL DECISION MAKING FREE TEXT BOX
labs and imaging reviewed.  Pt with concern for pheochromocytoma given level of BP, tachycardia and diaphoresis.  dimer negative.  cardio consulted and will follow recommended admission for further monitoring.

## 2022-05-15 NOTE — ED ADULT NURSE REASSESSMENT NOTE - NS ED NURSE REASSESS COMMENT FT1
pt remains AOX3, ambulatory when needed. BP more stable and pt now showing NSR on monitor. IV site patent, even and unlabored resps present. skin warm dry and intact. seen by cardio NP, aware of plan of care for admission.

## 2022-05-15 NOTE — CONSULT NOTE ADULT - ASSESSMENT
This is a 32 yr old female w/ PMH of PCOS, Adrenal cyst, HTN, Asthma, GERD, Fibromyalgia, Transaminits, who presents w/ 2 week history of HTN emergency /125 at home associated with headaches, pulses audible in her ears, facial flushing, profuse diaphoresis, and feeling of impending doom. Pt is known to our practice and sees Dr. Vegas who started workup for pheochromocytoma. Pt has strong family history of malignant hypertension w/ sudden cardiac death. will be admitted pending workup.

## 2022-05-15 NOTE — ED ADULT NURSE NOTE - OBJECTIVE STATEMENT
pt comes to ED with reports of palpitations and hypertension, states recently started on Amlodipine 5mg. pt reports she had an extra dose this morning because she was hypertensive. pt is AOX3, reports she has a known adrenal nodule, states she was seen by cardiology a few days ago as well due to her palpitations. pt states she had Covid and since then she has

## 2022-05-15 NOTE — H&P ADULT - HISTORY OF PRESENT ILLNESS
32F presented with complaint of palpitations and lightheadedness. She reported that she has had intermittent episodes of palpitations and elevated blood pressures in the past. She had previously been found to have a cystic adrenal lesion in the past and was in the midst of workup for possible pheochromocytoma by her cardiologist. She is unaware of any aggravating factors and noted that the episodes of palpitations had occurred at various time in the day and has woken her up from sleep in the past. She denied any associated chest pain, lower extremity edema, or orthopnea. She noted that the symptoms this time were more severe than the prior episodes which prompted her to seek medical evaluation. She denied any episodes of syncope. Most recently she was started on amlodipine for hypertension.

## 2022-05-15 NOTE — CONSULT NOTE ADULT - PROBLEM SELECTOR RECOMMENDATION 9
- Symptomatic, diaphoresis w/ headaches and head rushes/pulsations in ears  - Home BP was 180/125  - pt has strong family history with multiple family members dying at ~30-40 of sudden cardiac death   - Father at the bedside is on 5 different blood pressure medications  - Has history of adrenal mass   - would likely benefit from genetic testing/genome mapping for Hereditary Paraganglioma and Pheochromocytoma syndrome (PGL/PCC) as well as other genetic diseases that cause HTN emergencies.   - Pt's family history appears to follow autosomal dominant pattern of inheritance.   - takes amlodipine 10mg at home, spironolactone 150mg   - will need secondary agent for HTN management  - start losartan 25mg daily   - hydralazine 10mg IV   - avoid beta blockers until pheochromocytoma is ruled out   - check echo  - check renal ultrasound r/o AUDRA  - check MRI abd eval adrenal mass  - recommend admit to telemetry, monitor on telemetry  - recommend endocrine consult  - check TSH, cortisol, catecholamines, metanephrines, ACTH, renin

## 2022-05-15 NOTE — H&P ADULT - ASSESSMENT
41F with a history of polycystic ovarian syndrome, adrenal cyst, hypertension, asthma, GERD, psoriatic arthritis, and transaminitis who presented with palpitations and lightheadedness found to have uncontrolled hypertension.    Hypertension / Tachycardia -         GERD - On  41F with a history of polycystic ovarian syndrome, adrenal cyst, hypertension, asthma, GERD, psoriatic arthritis, and transaminitis who presented with palpitations and lightheadedness found to have uncontrolled hypertension.    Hypertensive urgency / Tachycardia - Intermittent episodes of tachycardia and hypertension noted by history. The patient was also noted to have an adrenal lesion. She was followed by Cardiology on an outpatient basis with serology and 24 hour urine collection for metanephrine submitted but not resulted. Results to be reviewed when available. Thyroid panel was within normal parameters. On amlodipine and losartan for now. Renal artery doppler also pending. Cardiology consultation noted. For repeat MRI to evaluate the adrenal lesion. On telemetry, for echocardiogram.    Asthma - No dyspnea, wheezing, or hypoxia on examination.    Anxiety - On duloxetine with alprazolam as needed.    GERD - On pantoprazole. Diet as tolerated. 41F with a history of polycystic ovarian syndrome, adrenal cyst, hypertension, asthma, GERD, psoriatic arthritis, and transaminitis who presented with palpitations and lightheadedness found to have uncontrolled hypertension.    Hypertensive urgency / Tachycardia - Intermittent episodes of tachycardia and hypertension noted by history. The patient was also noted to have an adrenal lesion. She was followed by Cardiology on an outpatient basis with serology and 24 hour urine collection for metanephrine submitted but not resulted. Results to be reviewed when available. Thyroid panel was within normal parameters. On amlodipine and losartan for now. Renal artery doppler also pending. Cardiology consultation noted. For repeat MRI to evaluate the adrenal lesion. On telemetry, for echocardiogram.    Asthma - No dyspnea, wheezing, or hypoxia on examination.    Anxiety - On duloxetine with alprazolam as needed.    GERD - On pantoprazole. Diet as tolerated.    Transaminitis - Noted on prior history with history of hepatic steatosis.

## 2022-05-15 NOTE — HISTORY OF PRESENT ILLNESS
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 \par In march she had foot surgery and was told that BP is high\par got COVID twice. \par She has noticed that her diastolic BP is elevated.  \par She feels palpitiaotns and feels sweety at times. hears her pulse in her ear.\par No cp but does feel sob when she has these symptoms.\par Hx of psoriatic arhrits. \par on aldactone for pcos and acne\par \par In 2020: Had cyst in adrenal gland. \par \par

## 2022-05-15 NOTE — ED PROVIDER NOTE - PHYSICAL EXAMINATION
Constitutional - well-developed.   Head - NCAT. Airway patent.   Eyes - PERRL.   CV - tachy regular. no murmur. no edema.   Pulm - CTAB.   Abd - soft, nt. no rebound. no guarding.   Neuro - A&Ox3. strength 5/5 x4. sensation intact x4. normal gait.   Skin - No rash. .  MSK - normal ROM.

## 2022-05-15 NOTE — H&P ADULT - NSHPSOCIALHISTORY_GEN_ALL_CORE
PMH: PCOS, HTN, Asthma, GERD, Psoriatic arthritis, Transaminitis  PSH: Ankle surgery  Famity History: Parents with hypertension and diabetes, Uncles with sudden cardiac death  Social: Denied tobacco, alcohol, or illicit drug use

## 2022-05-15 NOTE — CONSULT NOTE ADULT - NS ATTEND AMEND GEN_ALL_CORE FT
Hypertensive Emergency  Symptomatic, diaphoresis w/ headaches and head rushes/pulsations in ears  Has history of adrenal mass    would likely benefit from genetic testing/genome mapping for Hereditary Paraganglioma and Pheochromocytoma syndrome (PGL/PCC) as well as other genetic diseases that cause HTN emergencies.

## 2022-05-15 NOTE — ASSESSMENT
[FreeTextEntry1] : Very pleasant 32-year-old female who has gained weight, has now elevated blood pressure, at times diaphoresis and blood pressure has been very labile.  Diastolic blood pressure is usually elevated.  She denies having any chest pain or shortness of breath.\par Patient is on Aldactone 150 mg due to PCOS and hirsutism.\par She also has an adrenal nodule.  She is advised to decrease her salt intake.  Increase potassium intake in diet and start amlodipine 5 mg daily.  Continue with spironolactone.\par Blood test as well as urine test for primary hypertension is ordered.\par Patient will also do a 24-hour urine collection for VMA's \par MRI of abdomen and pelvis for further evaluation of adrenal nodule and also to exclude fibromuscular dysplasia.\par Patient with reactive airway disease and occasional asthma which is managed with medications.\par She has hyperlipidemia with elevated triglycerides despite being on fenofibric.  We will need to address this at a later time.  She also has elevated liver enzymes may have fatty liver.\par Patient was advised to follow-up with me after the above work-up is complete.\par She will check her blood pressure at work and contact me if blood pressure is not less than 135/85.  I will discuss the results of the findings of the above tests with her once available.

## 2022-05-15 NOTE — REVIEW OF SYSTEMS
[Weight Gain (___ Lbs)] : [unfilled] ~Ulb weight gain [Feeling Fatigued] : feeling fatigued [Seeing Double (Diplopia)] : no diplopia [Discharge From Ears] : no discharge from the ears [Tinnitus] : no tinnitus [SOB] : no shortness of breath [Dyspnea on exertion] : not dyspnea during exertion [Palpitations] : no palpitations [Cough] : no cough [Wheezing] : no wheezing [Abdominal Pain] : no abdominal pain [Nausea] : no nausea [Change in Appetite] : no change in appetite [Change In The Stool] : no change in stool [Dysuria] : no dysuria [Myalgia] : no myalgia [Rash] : no rash [Dizziness] : no dizziness [Convulsions] : no convulsions [Confusion] : no confusion was observed [Under Stress] : not under stress [Easy Bleeding] : no tendency for easy bleeding

## 2022-05-15 NOTE — CONSULT NOTE ADULT - SUBJECTIVE AND OBJECTIVE BOX
[facility]                                              CARDIOLOGY AT [place] [address]                                             Telephone: [phone]. Fax:[phone]                                                       CARDIOLOGY CONSULTATION NOTE                                                                                             History obtained by: Patient and medical record  Community Cardiologist: Shasta   obtained: Yes [  ] No [ x ]  Reason for Consultation: Hypertensive emergency, r/o pheochromocytoma   Available out pt records reviewed: Yes [  ] No [  ]    Chief complaint:    Patient is a 32y old  Female who presents with a chief complaint of HTN Emergency    HPI:  This is a 32 yr old female w/ PMH of PCOS, Adrenal cyst, HTN, Asthma, GERD, Fibromyalgia, Transaminits, who presents w/ 2 week history of HTN emergency /125 at home associated with headaches, pulses audible in her ears, facial flushing, profuse diaphoresis, and feeling of impending doom. Pt is known to our practice and sees Dr. Vegas who did workup for       CARDIAC TESTING   ECHO:    STRESS:    CATH:     ELECTROPHYSIOLOGY:     PAST MEDICAL HISTORY  No pertinent past medical history        PAST SURGICAL HISTORY  No significant past surgical history        SOCIAL HISTORY:  Denies smoking/alcohol/drugs  CIGARETTES:     ALCOHOL:  DRUGS:    FAMILY HISTORY:  No pertinent family history in first degree relatives      Family History of Cardiovascular Disease:  Yes [  ] No [  ]  Coronary Artery Disease in first degree relative: Yes [  ] No [  ]  Sudden Cardiac Death in First degree relative: Yes [  ] No [  ]    HOME MEDICATIONS:      CURRENT CARDIAC MEDICATIONS:      CURRENT OTHER MEDICATIONS:      ALLERGIES:   No Known Allergies      REVIEW OF SYMPTOMS:   CONSTITUTIONAL: No fever, no chills, no weight loss, no weight gain, no fatigue   ENMT:  No vertigo; No sinus or throat pain  NECK: No pain or stiffness  CARDIOVASCULAR: No chest pain, no dyspnea, no syncope/presyncope, no palpitations, no dizziness, no Orthopnea, no Paroxsymal nocturnal dyspnea  RESPIRATORY: no Shortness of breath, no cough, no wheezing  : No dysuria, no hematuria   GI: No dark color stool, no nausea, no diarrhea, no constipation, no abdominal pain   NEURO: No headache, no slurred speech   MUSCULOSKELETAL: No joint pain or swelling; No muscle, back, or extremity pain  PSYCH: No agitation, no anxiety.    ALL OTHER REVIEW OF SYSTEMS ARE NEGATIVE.    VITAL SIGNS:  T(C): 37.1 (05-15-22 @ 12:56), Max: 37.1 (05-15-22 @ 12:56)  T(F): 98.8 (05-15-22 @ 12:56), Max: 98.8 (05-15-22 @ 12:56)  HR: 92 (05-15-22 @ 15:10) (92 - 131)  BP: 149/94 (05-15-22 @ 15:10) (149/94 - 173/127)  RR: 18 (05-15-22 @ 15:10) (18 - 20)  SpO2: 98% (05-15-22 @ 15:10) (97% - 98%)    INTAKE AND OUTPUT:       PHYSICAL EXAM:  Constitutional: Comfortable . No acute distress.   HEENT: Atraumatic and normocephalic , neck is supple . no JVD. No carotid bruit.  CNS: A&Ox3. No focal deficits.   Respiratory: CTAB, unlabored   Cardiovascular: RRR normal s1 s2. No murmur. No rubs or gallop.  Gastrointestinal: Soft, non-tender. +Bowel sounds.   Extremities: 2+ Peripheral Pulses, No clubbing, cyanosis, or edema  Psychiatric: Calm . no agitation.   Skin: Warm and dry, no ulcers on extremities     LABS:  ( 15 May 2022 14:30 )  Troponin T  <0.01,  CPK  X    , CKMB  X    , BNP X                                  12.7   10.10 )-----------( 451      ( 15 May 2022 14:30 )             37.5     05-15    136  |  98  |  10.5  ----------------------------<  138<H>  3.7   |  21.0<L>  |  0.54    Ca    9.5      15 May 2022 14:30  Mg     1.8     05-15    TPro  8.0  /  Alb  4.3  /  TBili  0.4  /  DBili  x   /  AST  106<H>  /  ALT  148<H>  /  AlkPhos  119  05-15    PT/INR - ( 15 May 2022 14:30 )   PT: 13.1 sec;   INR: 1.13 ratio                 Thyroid Stimulating Hormone, Serum: 0.51 uIU/mL (05-15-22 @ 14:30)      INTERPRETATION OF TELEMETRY:     ECG:   Prior ECG: Yes [  ] No [  ]    RADIOLOGY & ADDITIONAL STUDIES:    X-ray:    CT scan:   MRI:   US:                                                [facility]                                              CARDIOLOGY AT [address], [place] [address]                                             Telephone: [phone]. Fax:[phone]                                                       CARDIOLOGY CONSULTATION NOTE                                                                                             History obtained by: Patient and medical record  Community Cardiologist: Shasta   obtained: Yes [  ] No [ x ]  Reason for Consultation: Hypertensive emergency, r/o pheochromocytoma   Available out pt records reviewed: Yes [ x ] No [  ]    Chief complaint:    Patient is a 32y old  Female who presents with a chief complaint of HTN Emergency    HPI:  This is a 32 yr old female w/ PMH of PCOS, Adrenal cyst, HTN, Asthma, GERD, Fibromyalgia, Transaminits, who presents w/ 2 week history of HTN emergency /125 at home associated with headaches, pulses audible in her ears, facial flushing, profuse diaphoresis, and feeling of impending doom. Pt is known to our practice and sees Dr. Vegas who started workup for pheochromocytoma. Pt has strong family history of malignant hypertension w/ sudden cardiac death. will be admitted pending workup.     CARDIAC TESTING   ECHO: pending     PAST MEDICAL HISTORY  No pertinent past medical history    PAST SURGICAL HISTORY  No significant past surgical history    SOCIAL HISTORY:  Denies smoking/alcohol/drugs  CIGARETTES:     ALCOHOL:  DRUGS:    FAMILY HISTORY:  No pertinent family history in first degree relatives    Family History of Cardiovascular Disease:  Yes [ x ] No [  ]  Coronary Artery Disease in first degree relative: Yes [ x ] No [  ]  Sudden Cardiac Death in First degree relative: Yes [ x ] No [  ]    ALLERGIES:   No Known Allergies    REVIEW OF SYMPTOMS:   CONSTITUTIONAL: No fever, no chills, no weight loss, no weight gain, no fatigue   ENMT:  No vertigo; No sinus or throat pain  NECK: No pain or stiffness  CARDIOVASCULAR: No chest pain, no dyspnea, no syncope/presyncope, no palpitations, no dizziness, no Orthopnea, no Paroxsymal nocturnal dyspnea  RESPIRATORY: no Shortness of breath, no cough, no wheezing  : No dysuria, no hematuria   GI: No dark color stool, no nausea, no diarrhea, no constipation, no abdominal pain   NEURO: No headache, no slurred speech   MUSCULOSKELETAL: No joint pain or swelling; No muscle, back, or extremity pain  PSYCH: No agitation, no anxiety.    ALL OTHER REVIEW OF SYSTEMS ARE NEGATIVE.    VITAL SIGNS:  T(C): 37.1 (05-15-22 @ 12:56), Max: 37.1 (05-15-22 @ 12:56)  T(F): 98.8 (05-15-22 @ 12:56), Max: 98.8 (05-15-22 @ 12:56)  HR: 92 (05-15-22 @ 15:10) (92 - 131)  BP: 149/94 (05-15-22 @ 15:10) (149/94 - 173/127)  RR: 18 (05-15-22 @ 15:10) (18 - 20)  SpO2: 98% (05-15-22 @ 15:10) (97% - 98%)    INTAKE AND OUTPUT:       PHYSICAL EXAM:  Constitutional: Comfortable . No acute distress.   HEENT: Atraumatic and normocephalic , neck is supple . no JVD. No carotid bruit.  CNS: A&Ox3. No focal deficits.   Respiratory: CTAB, unlabored   Cardiovascular: RRR normal s1 s2. No murmur. No rubs or gallop.  Gastrointestinal: Soft, non-tender. +Bowel sounds.   Extremities: 2+ Peripheral Pulses, No clubbing, cyanosis, or edema  Psychiatric: Calm . no agitation.   Skin: Warm and dry, no ulcers on extremities     LABS:  ( 15 May 2022 14:30 )  Troponin T  <0.01,  CPK  X    , CKMB  X    , BNP X                   12.7   10.10 )-----------( 451      ( 15 May 2022 14:30 )             37.5     05-15    136  |  98  |  10.5  ----------------------------<  138<H>  3.7   |  21.0<L>  |  0.54    Ca    9.5      15 May 2022 14:30  Mg     1.8     05-15    TPro  8.0  /  Alb  4.3  /  TBili  0.4  /  DBili  x   /  AST  106<H>  /  ALT  148<H>  /  AlkPhos  119  05-15    PT/INR - ( 15 May 2022 14:30 )   PT: 13.1 sec;   INR: 1.13 ratio      Thyroid Stimulating Hormone, Serum: 0.51 uIU/mL (05-15-22 @ 14:30)    INTERPRETATION OF TELEMETRY:      Prior ECG: Yes [ x ] No [  ]    RADIOLOGY & ADDITIONAL STUDIES:    X-ray:    < from: Xray Chest 1 View- PORTABLE-Urgent (05.15.22 @ 14:07) >  IMPRESSION: No evidence of active chest disease.    < end of copied text >

## 2022-05-16 ENCOUNTER — NON-APPOINTMENT (OUTPATIENT)
Age: 32
End: 2022-05-16

## 2022-05-16 ENCOUNTER — APPOINTMENT (OUTPATIENT)
Dept: ULTRASOUND IMAGING | Facility: CLINIC | Age: 32
End: 2022-05-16

## 2022-05-16 LAB
A1C WITH ESTIMATED AVERAGE GLUCOSE RESULT: 5.9 % — HIGH (ref 4–5.6)
ACTH SER-ACNC: 3.3 PG/ML — LOW (ref 7.2–63.3)
ALDOST SERPL-MCNC: 44.1 NG/DL — HIGH
ALDOSTERONE SERUM: 37.4 NG/DL
ANION GAP SERPL CALC-SCNC: 15 MMOL/L — SIGNIFICANT CHANGE UP (ref 5–17)
BUN SERPL-MCNC: 10.3 MG/DL — SIGNIFICANT CHANGE UP (ref 8–20)
CALCIUM SERPL-MCNC: 9 MG/DL — SIGNIFICANT CHANGE UP (ref 8.6–10.2)
CHLORIDE SERPL-SCNC: 100 MMOL/L — SIGNIFICANT CHANGE UP (ref 98–107)
CHOLEST SERPL-MCNC: 221 MG/DL — HIGH
CO2 SERPL-SCNC: 24 MMOL/L — SIGNIFICANT CHANGE UP (ref 22–29)
CORTIS AM PEAK SERPL-MCNC: 10.1 UG/DL — SIGNIFICANT CHANGE UP (ref 6–18.4)
CREAT SERPL-MCNC: 0.63 MG/DL — SIGNIFICANT CHANGE UP (ref 0.5–1.3)
EGFR: 122 ML/MIN/1.73M2 — SIGNIFICANT CHANGE UP
ESTIMATED AVERAGE GLUCOSE: 123 MG/DL — HIGH (ref 68–114)
GLUCOSE SERPL-MCNC: 112 MG/DL — HIGH (ref 70–99)
HCT VFR BLD CALC: 36.2 % — SIGNIFICANT CHANGE UP (ref 34.5–45)
HDLC SERPL-MCNC: 50 MG/DL — LOW
HGB BLD-MCNC: 12.1 G/DL — SIGNIFICANT CHANGE UP (ref 11.5–15.5)
LIPID PNL WITH DIRECT LDL SERPL: 137 MG/DL — HIGH
MCHC RBC-ENTMCNC: 29.1 PG — SIGNIFICANT CHANGE UP (ref 27–34)
MCHC RBC-ENTMCNC: 33.4 GM/DL — SIGNIFICANT CHANGE UP (ref 32–36)
MCV RBC AUTO: 87 FL — SIGNIFICANT CHANGE UP (ref 80–100)
NON HDL CHOLESTEROL: 171 MG/DL — HIGH
PLATELET # BLD AUTO: 426 K/UL — HIGH (ref 150–400)
POTASSIUM SERPL-MCNC: 4.2 MMOL/L — SIGNIFICANT CHANGE UP (ref 3.5–5.3)
POTASSIUM SERPL-SCNC: 4.2 MMOL/L — SIGNIFICANT CHANGE UP (ref 3.5–5.3)
RBC # BLD: 4.16 M/UL — SIGNIFICANT CHANGE UP (ref 3.8–5.2)
RBC # FLD: 12.4 % — SIGNIFICANT CHANGE UP (ref 10.3–14.5)
RENIN PLASMA: 16.1 PG/ML
SODIUM SERPL-SCNC: 139 MMOL/L — SIGNIFICANT CHANGE UP (ref 135–145)
TRIGL SERPL-MCNC: 169 MG/DL — HIGH
WBC # BLD: 9.61 K/UL — SIGNIFICANT CHANGE UP (ref 3.8–10.5)
WBC # FLD AUTO: 9.61 K/UL — SIGNIFICANT CHANGE UP (ref 3.8–10.5)

## 2022-05-16 PROCEDURE — 99232 SBSQ HOSP IP/OBS MODERATE 35: CPT

## 2022-05-16 RX ORDER — DULOXETINE HYDROCHLORIDE 30 MG/1
30 CAPSULE, DELAYED RELEASE ORAL AT BEDTIME
Refills: 0 | Status: DISCONTINUED | OUTPATIENT
Start: 2022-05-16 | End: 2022-05-18

## 2022-05-16 RX ORDER — LORATADINE 10 MG/1
10 TABLET ORAL AT BEDTIME
Refills: 0 | Status: DISCONTINUED | OUTPATIENT
Start: 2022-05-16 | End: 2022-05-18

## 2022-05-16 RX ORDER — IBUPROFEN 200 MG
400 TABLET ORAL ONCE
Refills: 0 | Status: COMPLETED | OUTPATIENT
Start: 2022-05-16 | End: 2022-05-16

## 2022-05-16 RX ORDER — SPIRONOLACTONE 25 MG/1
150 TABLET, FILM COATED ORAL AT BEDTIME
Refills: 0 | Status: DISCONTINUED | OUTPATIENT
Start: 2022-05-16 | End: 2022-05-18

## 2022-05-16 RX ORDER — MONTELUKAST 4 MG/1
10 TABLET, CHEWABLE ORAL AT BEDTIME
Refills: 0 | Status: DISCONTINUED | OUTPATIENT
Start: 2022-05-16 | End: 2022-05-18

## 2022-05-16 RX ADMIN — PANTOPRAZOLE SODIUM 40 MILLIGRAM(S): 20 TABLET, DELAYED RELEASE ORAL at 05:41

## 2022-05-16 RX ADMIN — Medication 400 MILLIGRAM(S): at 11:37

## 2022-05-16 RX ADMIN — AMLODIPINE BESYLATE 10 MILLIGRAM(S): 2.5 TABLET ORAL at 05:41

## 2022-05-16 RX ADMIN — Medication 400 MILLIGRAM(S): at 12:37

## 2022-05-16 RX ADMIN — MONTELUKAST 10 MILLIGRAM(S): 4 TABLET, CHEWABLE ORAL at 21:50

## 2022-05-16 RX ADMIN — DULOXETINE HYDROCHLORIDE 30 MILLIGRAM(S): 30 CAPSULE, DELAYED RELEASE ORAL at 21:50

## 2022-05-16 RX ADMIN — LOSARTAN POTASSIUM 25 MILLIGRAM(S): 100 TABLET, FILM COATED ORAL at 05:41

## 2022-05-16 RX ADMIN — SPIRONOLACTONE 150 MILLIGRAM(S): 25 TABLET, FILM COATED ORAL at 21:49

## 2022-05-16 RX ADMIN — LORATADINE 10 MILLIGRAM(S): 10 TABLET ORAL at 21:50

## 2022-05-16 RX ADMIN — Medication 1 MILLIGRAM(S): at 21:49

## 2022-05-16 NOTE — PROGRESS NOTE ADULT - SUBJECTIVE AND OBJECTIVE BOX
Patient is a 32y old  Female who presents with a chief complaint of HTN (16 May 2022 08:26)      SUBJECTIVE / OVERNIGHT EVENTS: no new events, still with palpitations, on tele HR to 150s with exertion    MEDICATIONS  (STANDING):  amLODIPine   Tablet 10 milliGRAM(s) Oral daily  ibuprofen  Tablet. 400 milliGRAM(s) Oral once  losartan 25 milliGRAM(s) Oral daily  pantoprazole    Tablet 40 milliGRAM(s) Oral before breakfast  spironolactone 150 milliGRAM(s) Oral at bedtime    MEDICATIONS  (PRN):  ALPRAZolam 1 milliGRAM(s) Oral at bedtime PRN anxiety  cyclobenzaprine 5 milliGRAM(s) Oral three times a day PRN Muscle Spasm  hydrALAZINE Injectable 10 milliGRAM(s) IV Push every 6 hours PRN SBP > 160      T(C): 36.6 (05-16-22 @ 04:31), Max: 37.1 (05-15-22 @ 12:56)  HR: 92 (05-16-22 @ 04:31) (91 - 131)  BP: 140/97 (05-16-22 @ 04:31) (139/99 - 173/127)  RR: 18 (05-16-22 @ 04:31) (18 - 20)  SpO2: 98% (05-16-22 @ 04:31) (97% - 99%)  CAPILLARY BLOOD GLUCOSE        I&O's Summary      PHYSICAL EXAM:  GENERAL: NAD  HEAD:  Atraumatic, Normocephalic  EYES: EOMI, PERRLA, conjunctiva and sclera clear  NECK: Supple, No JVD  CHEST/LUNG: Clear to auscultation bilaterally; No wheeze  HEART: Regular rate and rhythm; No murmurs, rubs, or gallops  ABDOMEN: Soft, Nontender, Nondistended; Bowel sounds present  EXTREMITIES:  2+ Peripheral Pulses, No clubbing, cyanosis, or edema  PSYCH: AAOx3  NEUROLOGY: non-focal  SKIN: No rashes or lesions    LABS:                        12.1   9.61  )-----------( 426      ( 16 May 2022 07:50 )             36.2     05-16    139  |  100  |  10.3  ----------------------------<  112<H>  4.2   |  24.0  |  0.63    Ca    9.0      16 May 2022 07:50  Mg     1.8     05-15    TPro  8.0  /  Alb  4.3  /  TBili  0.4  /  DBili  x   /  AST  106<H>  /  ALT  148<H>  /  AlkPhos  119  05-15    PT/INR - ( 15 May 2022 14:30 )   PT: 13.1 sec;   INR: 1.13 ratio           CARDIAC MARKERS ( 15 May 2022 14:30 )  x     / <0.01 ng/mL / x     / x     / x              RADIOLOGY & ADDITIONAL TESTS:    Imaging Personally Reviewed:    Consultant(s) Notes Reviewed:      Care Discussed with Consultants/Other Providers:

## 2022-05-16 NOTE — PROGRESS NOTE ADULT - ASSESSMENT
41F with a history of polycystic ovarian syndrome, adrenal cyst, hypertension, asthma, GERD, psoriatic arthritis, and transaminitis who presented with palpitations and lightheadedness found to have uncontrolled hypertension.    Hypertensive urgency / Tachycardia - Intermittent episodes of tachycardia and hypertension noted by history. The patient was also noted to have an adrenal lesion. She was followed by Cardiology on an outpatient basis with serology and 24 hour urine collection for metanephrine submitted but not resulted. Results to be reviewed when available. Thyroid panel was within normal parameters. On amlodipine and losartan for now. Renal artery doppler also pending. Cardiology consultation noted. For repeat MRI to evaluate the adrenal lesion. On telemetry, for echocardiogram.    Asthma - No dyspnea, wheezing, or hypoxia on examination.    Anxiety - On duloxetine with alprazolam as needed.    GERD - On pantoprazole. Diet as tolerated.    Transaminitis - Noted on prior history with history of hepatic steatosis.

## 2022-05-16 NOTE — PROGRESS NOTE ADULT - ASSESSMENT
32 yr old female w/ PMH of PCOS, Adrenal cyst, HTN, Asthma, GERD, Fibromyalgia, Transaminits,   who presents w/ 2 week history of HTN emergency /125 at home associated with headaches, pulses audible in her ears, facial flushing, profuse diaphoresis, and feeling of impending doom.   Pt is known to our practice and sees Dr. Vegas who started workup for pheochromocytoma. Pt has strong family history of malignant hypertension  w/ sudden cardiac death.     5/16/22 Hemodyamically stable and slowly improving BP better, still with palpatations and SVT overnight .     Problem/Recommendation - 1:  ·  Problem: Hypertensive emergency. / BP improving   · - Has history of adrenal mass   - would likely benefit from genetic testing/genome mapping for Hereditary Paraganglioma and Pheochromocytoma syndrome (PGL/PCC) as well as other genetic diseases that cause HTN emergencies.   - - continue  amlodipine 10mg/ spironolactone 150mg   - start losartan 25mg daily   - hydralazine 10mg IV   - avoid beta blockers until pheochromocytoma is ruled out   - check echo; results pending   - check renal ultrasound r/o AUDRA  - check MRI pending  abd eval adrenal mass  - recommend endocrine consult  - check TSH, cortisol, catecholamines, metanephrines, ACTH, renin. pending     Problem/Recommendation - 2:  ·  Problem: R/O Pheochromocytoma.   · - plan as above.  work up in progress   discussed with Dr. Vegas

## 2022-05-16 NOTE — PROGRESS NOTE ADULT - SUBJECTIVE AND OBJECTIVE BOX
[facility]                                                         CARDIOLOGY AT [facility]                                                                  [address] [facility] [place] [address]                                                         Telephone: [phone]. Fax:[phone]                                                                             PROGRESS NOTE    Reason for follow up: HTN urgency  Update: "feels better" headache improved, denies complaints of chest pain/sob/dizziness/ BP improved  still with palpatations overnight         Review of symptoms:   Cardiac:  No chest pain. No dyspnea.+ palpitations.  Respiratory: no cough. No dyspnea  Gastrointestinal: No diarrhea. No abdominal pain. No bleeding.   Neuro: No focal neuro complaints.    Vitals:  T(C): 36.6 (05-16-22 @ 04:31), Max: 37.1 (05-15-22 @ 12:56)  HR: 92 (05-16-22 @ 04:31) (91 - 131)  BP: 140/97 (05-16-22 @ 04:31) (139/99 - 173/127)  RR: 18 (05-16-22 @ 04:31) (18 - 20)  SpO2: 98% (05-16-22 @ 04:31) (97% - 99%)  Wt(kg): --  I&O's Summary    Weight (kg): 90.7 (05-15 @ 12:56)    PHYSICAL EXAM:  Appearance: Comfortable. No acute distress  HEENT:  Atraumatic. Normocephalic.  Normal oral mucosa  Neurologic: A & O x 3, no gross focal deficits.  Cardiovascular: RRR S1 S2,RRR 97  No murmur, no rubs/gallops. No JVD  Respiratory: Lungs clear to auscultation, unlabored   Gastrointestinal:  Soft, Non-tender, + BS  Lower Extremities: 2+ Peripheral Pulses, No clubbing, cyanosis, or edema  Psychiatry: Patient is calm. No agitation.   Skin: warm and dry.    CURRENT CARDIAC MEDICATIONS:  amLODIPine   Tablet 10 milliGRAM(s) Oral daily  hydrALAZINE Injectable 10 milliGRAM(s) IV Push every 6 hours PRN  losartan 25 milliGRAM(s) Oral daily  spironolactone 150 milliGRAM(s) Oral at bedtime      CURRENT OTHER MEDICATIONS:  loratadine 10 milliGRAM(s) Oral daily  montelukast 10 milliGRAM(s) Oral daily  ALPRAZolam 1 milliGRAM(s) Oral at bedtime PRN anxiety  cyclobenzaprine 5 milliGRAM(s) Oral three times a day PRN Muscle Spasm  DULoxetine 30 milliGRAM(s) Oral daily  pantoprazole    Tablet 40 milliGRAM(s) Oral before breakfast      LABS:	 	  ( 15 May 2022 14:30 )  Troponin T  <0.01,  CPK  X    , CKMB  X    , BNP X                                  12.1   9.61  )-----------( 426      ( 16 May 2022 07:50 )             36.2     05-15    136  |  98  |  10.5  ----------------------------<  138<H>  3.7   |  21.0<L>  |  0.54    Ca    9.5      15 May 2022 14:30  Mg     1.8     05-15    TPro  8.0  /  Alb  4.3  /  TBili  0.4  /  DBili  x   /  AST  106<H>  /  ALT  148<H>  /  AlkPhos  119  05-15    PT/INR/PTT ( 15 May 2022 14:30 )                       :                       :      13.1         :       X                     .        .                   .              .           .       1.13        .                                       Lipid Profile:   HgA1c:   TSH: Thyroid Stimulating Hormone, Serum: 0.51 uIU/mL      TELEMETRY: RSR 90's with rates SVT up to 150 overnight / not sustained     DIAGNOSTIC TESTING:  [ ] Echocardiogram:  Pending   MRI pending 	    < from: US Abdomen Upper Quadrant Right (05.15.22 @ 18:53) >  MPRESSION:  Hepatic steatosis.  No cholelithiasis.

## 2022-05-17 PROCEDURE — 99232 SBSQ HOSP IP/OBS MODERATE 35: CPT

## 2022-05-17 PROCEDURE — 72197 MRI PELVIS W/O & W/DYE: CPT | Mod: 26

## 2022-05-17 PROCEDURE — 74183 MRI ABD W/O CNTR FLWD CNTR: CPT | Mod: 26

## 2022-05-17 PROCEDURE — 99233 SBSQ HOSP IP/OBS HIGH 50: CPT

## 2022-05-17 PROCEDURE — 70551 MRI BRAIN STEM W/O DYE: CPT | Mod: 26

## 2022-05-17 RX ORDER — METOPROLOL TARTRATE 50 MG
12.5 TABLET ORAL EVERY 6 HOURS
Refills: 0 | Status: DISCONTINUED | OUTPATIENT
Start: 2022-05-17 | End: 2022-05-18

## 2022-05-17 RX ORDER — AMLODIPINE BESYLATE 2.5 MG/1
5 TABLET ORAL DAILY
Refills: 0 | Status: DISCONTINUED | OUTPATIENT
Start: 2022-05-17 | End: 2022-05-18

## 2022-05-17 RX ADMIN — AMLODIPINE BESYLATE 10 MILLIGRAM(S): 2.5 TABLET ORAL at 06:35

## 2022-05-17 RX ADMIN — Medication 12.5 MILLIGRAM(S): at 17:29

## 2022-05-17 RX ADMIN — SPIRONOLACTONE 150 MILLIGRAM(S): 25 TABLET, FILM COATED ORAL at 22:12

## 2022-05-17 RX ADMIN — PANTOPRAZOLE SODIUM 40 MILLIGRAM(S): 20 TABLET, DELAYED RELEASE ORAL at 06:36

## 2022-05-17 RX ADMIN — DULOXETINE HYDROCHLORIDE 30 MILLIGRAM(S): 30 CAPSULE, DELAYED RELEASE ORAL at 22:12

## 2022-05-17 RX ADMIN — Medication 12.5 MILLIGRAM(S): at 23:53

## 2022-05-17 RX ADMIN — Medication 12.5 MILLIGRAM(S): at 10:42

## 2022-05-17 RX ADMIN — LORATADINE 10 MILLIGRAM(S): 10 TABLET ORAL at 22:12

## 2022-05-17 RX ADMIN — MONTELUKAST 10 MILLIGRAM(S): 4 TABLET, CHEWABLE ORAL at 22:11

## 2022-05-17 RX ADMIN — Medication 1 MILLIGRAM(S): at 22:15

## 2022-05-17 RX ADMIN — LOSARTAN POTASSIUM 25 MILLIGRAM(S): 100 TABLET, FILM COATED ORAL at 06:35

## 2022-05-17 NOTE — PROGRESS NOTE ADULT - PROBLEM SELECTOR PLAN 1
BP improving   - Has history of adrenal mass   - would likely benefit from genetic testing/genome mapping for Hereditary Paraganglioma and Pheochromocytoma syndrome (PGL/PCC) as well as other genetic diseases that cause HTN emergencies.   - Decrease Norvasc 5mg po daily, D.C losartan, and add bb   - spironolactone 150mg   - hydralazine 10mg IV PRN  - avoid beta blockers until pheochromocytoma is ruled out   - Echo:  Ef 55-60%  - check renal ultrasound - no renal artery stenosis noted.   - MRI pending  abd eval adrenal mass  - recommend endocrine consult  - cortisol 10.1 normal  - catecholamines, metanephrine, ACTH, renin. pending  -Add BP improving   - Has history of adrenal mass  - would likely benefit from genetic testing/genome mapping for Hereditary Paraganglioma and Pheochromocytoma syndrome (PGL/PCC) as well as other genetic diseases that cause HTN emergencies.   - Decrease Norvasc 5mg po daily, D.C losartan, and add bb   - spironolactone 150mg   - hydralazine 10mg IV PRN  - avoid beta blockers until pheochromocytoma is ruled out   - Echo:  Ef 55-60%  - check renal ultrasound - no renal artery stenosis noted.   - MRI pending -  abd eval adrenal mass  - recommend endocrine consult  - cortisol 10.1 normal  - catecholamines, metanephrine, ACTH, renin. pending  -Add hepatitis panel, IgG subset, microsomal antibody assay liver  mitochondrial antibody kidney, smooth muscle antibody, soluble liver antibody antigen

## 2022-05-17 NOTE — PROGRESS NOTE ADULT - ASSESSMENT
41F with a history of polycystic ovarian syndrome, adrenal cyst, hypertension, asthma, GERD, psoriatic arthritis, and transaminitis who presented with palpitations and lightheadedness found to have uncontrolled hypertension.    Hypertensive urgency / Tachycardia - Intermittent episodes of tachycardia and hypertension noted by history. The patient was also noted to have an adrenal lesion. She was followed by Cardiology on an outpatient basis with serology and 24 hour urine collection for metanephrine submitted but not resulted. Results to be reviewed when available. Thyroid panel was within normal parameters. On amlodipine and losartan for now.  -echocardiogram with normal LV function, size, no hypertrophy  -awaiting MRI  -cards f/u    Asthma - No dyspnea, wheezing, or hypoxia on examination.    Anxiety - On duloxetine with alprazolam as needed.    GERD - On pantoprazole. Diet as tolerated.    Transaminitis - Noted on prior history with history of hepatic steatosis.

## 2022-05-17 NOTE — PROGRESS NOTE ADULT - SUBJECTIVE AND OBJECTIVE BOX
[facility]                                                         CARDIOLOGY AT [facility]                                                                  [address], [place] [address]                                                         Telephone: [phone]. Fax:[phone]                                                                             PROGRESS NOTE    Reason for follow up:   HTN urgency    Update:   BP well controlled:  119/78 today.  Planning to go down for MR/Pelvis ct today.        Review of symptoms:   Cardiac:  No chest pain. No dyspnea. No palpitations.  Respiratory: no cough. No dyspnea  Gastrointestinal: No diarrhea. No abdominal pain. No bleeding.   Neuro: No focal neuro complaints.    Vitals:  T(C): 36.7 (05-17-22 @ 06:39), Max: 37.1 (05-16-22 @ 22:07)  HR: 101 (05-17-22 @ 06:39) (101 - 132)  BP: 119/78 (05-17-22 @ 06:39) (119/78 - 130/93)  RR: 18 (05-17-22 @ 06:39) (18 - 18)  SpO2: 98% (05-17-22 @ 06:39) (98% - 98%)    I&O's Summary  16 May 2022 07:01  -  17 May 2022 07:00  --------------------------------------------------------  IN: 240 mL / OUT: 0 mL / NET: 240 mL  Weight (kg): 90.7 (05-15 @ 12:56)    PHYSICAL EXAM:  Appearance: Comfortable. No acute distress  HEENT:  Atraumatic. Normocephalic.  Normal oral mucosa  Neurologic: A & O x 3, no gross focal deficits.  Cardiovascular: RRR S1 S2, No murmur, no rubs/gallops. No JVD  Respiratory: Lungs clear to auscultation, unlabored   Gastrointestinal:  Soft, Non-tender, + BS  Lower Extremities: 2+ Peripheral Pulses, No clubbing, cyanosis, or edema  Psychiatry: Patient is calm. No agitation.   Skin: warm and dry.    CURRENT CARDIAC MEDICATIONS:  amLODIPine   Tablet 5 milliGRAM(s) Oral daily  hydrALAZINE Injectable 10 milliGRAM(s) IV Push every 6 hours PRN  metoprolol tartrate 12.5 milliGRAM(s) Oral every 6 hours  spironolactone 150 milliGRAM(s) Oral at bedtime    CURRENT OTHER MEDICATIONS:  loratadine 10 milliGRAM(s) Oral at bedtime  montelukast 10 milliGRAM(s) Oral at bedtime  ALPRAZolam 1 milliGRAM(s) Oral at bedtime PRN anxiety  cyclobenzaprine 5 milliGRAM(s) Oral three times a day PRN Muscle Spasm  DULoxetine 30 milliGRAM(s) Oral at bedtime  pantoprazole    Tablet 40 milliGRAM(s) Oral before breakfast    LABS:	 	  ( 15 May 2022 14:30 )  Troponin T  <0.01,  CPK  X    , CKMB  X    , BNP X                            12.1   9.61  )-----------( 426      ( 16 May 2022 07:50 )             36.2     05-16  139  |  100  |  10.3  ----------------------------<  112<H>  4.2   |  24.0  |  0.63  Ca    9.0      16 May 2022 07:50  Mg     1.8     05-15  TPro  8.0  /  Alb  4.3  /  TBili  0.4  /  DBili  x   /  AST  106<H>  /  ALT  148<H>  /  AlkPhos  119  05-15  PT/INR/PTT ( 15 May 2022 14:30 )      13.1         :       X                     .        .                   .              .           .       1.13        .                                       Lipid Profile: Date: 05-16 @ 07:50  Total cholesterol 221; Direct LDL: --; HDL: 50; Triglycerides:169  TSH: Thyroid Stimulating Hormone, Serum: 0.51 uIU/mL  TELEMETRY:   St currently rates 110, rates up to 150-160's.                                                                [facility]                                                         CARDIOLOGY AT [facility]                                                                  [address], [place] [address]                                                         Telephone: [phone]. Fax:[phone]                                                                             PROGRESS NOTE    Reason for follow up:   HTN urgency    Update:   BP well controlled:  119/78 today. Plan:  d.C losartan, decreased Norvasc and add bb.   Planning to go down for MR/Pelvis ct.   Also a brain MRI is requested without contrast urgent.    TTE:  Ef 55-60%    Review of symptoms:   Cardiac:  No chest pain. No dyspnea. No palpitations.  Respiratory: no cough. No dyspnea  Gastrointestinal: No diarrhea. No abdominal pain. No bleeding.   Neuro: No focal neuro complaints.    Vitals:  T(C): 36.7 (22 @ 06:39), Max: 37.1 (22 @ 22:07)  HR: 101 (22 @ 06:39) (101 - 132)  BP: 119/78 (22 @ 06:39) (119/78 - 130/93)  RR: 18 (22 @ 06:39) (18 - 18)  SpO2: 98% (22 @ 06:39) (98% - 98%)    I&O's Summary  16 May 2022 07:01  -  17 May 2022 07:00  --------------------------------------------------------  IN: 240 mL / OUT: 0 mL / NET: 240 mL  Weight (kg): 90.7 (05-15 @ 12:56)    PHYSICAL EXAM:  Appearance: Comfortable. No acute distress  HEENT:  Atraumatic. Normocephalic.  Normal oral mucosa  Neurologic: A & O x 3, no gross focal deficits.  Cardiovascular: RRR S1 S2, No murmur, no rubs/gallops. No JVD  Respiratory: Lungs clear to auscultation, unlabored   Gastrointestinal:  Soft, Non-tender, + BS  Lower Extremities: 2+ Peripheral Pulses, No clubbing, cyanosis, or edema  Psychiatry: Patient is calm. No agitation.   Skin: warm and dry.    CURRENT CARDIAC MEDICATIONS:  amLODIPine   Tablet 5 milliGRAM(s) Oral daily  hydrALAZINE Injectable 10 milliGRAM(s) IV Push every 6 hours PRN  metoprolol tartrate 12.5 milliGRAM(s) Oral every 6 hours  spironolactone 150 milliGRAM(s) Oral at bedtime    CURRENT OTHER MEDICATIONS:  loratadine 10 milliGRAM(s) Oral at bedtime  montelukast 10 milliGRAM(s) Oral at bedtime  ALPRAZolam 1 milliGRAM(s) Oral at bedtime PRN anxiety  cyclobenzaprine 5 milliGRAM(s) Oral three times a day PRN Muscle Spasm  DULoxetine 30 milliGRAM(s) Oral at bedtime  pantoprazole    Tablet 40 milliGRAM(s) Oral before breakfast    LABS:	 	  ( 15 May 2022 14:30 )  Troponin T  <0.01,  CPK  X    , CKMB  X    , BNP X                            12.1   9.61  )-----------( 426      ( 16 May 2022 07:50 )             36.2     05-  139  |  100  |  10.3  ----------------------------<  112<H>  4.2   |  24.0  |  0.63  Ca    9.0      16 May 2022 07:50  Mg     1.8     -15  TPro  8.0  /  Alb  4.3  /  TBili  0.4  /  DBili  x   /  AST  106<H>  /  ALT  148<H>  /  AlkPhos  119  05-15  PT/INR/PTT ( 15 May 2022 14:30 )      13.1         :       X                     .        .                   .              .           .       1.13        .                                       Lipid Profile: Date:  @ 07:50  Total cholesterol 221; Direct LDL: --; HDL: 50; Triglycerides:169  TSH: Thyroid Stimulating Hormone, Serum: 0.51 uIU/mL  TELEMETRY:   St currently rates 110, rates up to 150-160's.        ACC: [id number] EXAM:  ECHO TTE WO CON COMP W DOPP                          PROCEDURE DATE:  2022          INTERPRETATION:  TRANSTHORACIC ECHOCARDIOGRAM REPORT        Patient Name:   COURTNEY DEY Patient Location: Inpatient  Medical Rec #:  [id number]               Accession #:      [id number]  Account #:                             Height:           65.0 in 165.0 cm  YOB: 1990              Weight:           198.4 lb 90.00 kg  Patient Age:    32 years               BSA:              1.97 m²  Patient Gender: F                      BP:               140/97 mmHg      Date of Exam:        2022 1:25:53 PM  Sonographer:         Yoanna Galaviz  Referring Physician: Micah Duncan MD    Procedure:   2D Echo/Doppler/Color Doppler Complete and Echocardiogram   with               Definity Contrast.  Indications: Hypertensive heart disease without heart failure - I11.9  Diagnosis:   Dyspnea, unspecified - R06.00        2D AND M-MODE MEASUREMENTS (normal ranges within parentheses):  Left                 Normal   Aorta/Left            Normal  Ventricle:                    Atrium:  IVSd (2D):    0.94  (0.7-1.1) Aortic Root    2.65  (2.4-3.7)                 cm             (2D):           cm  LVPWd (2D):   0.91  (0.7-1.1) Left Atrium    2.33  (1.9-4.0)                 cm             (2D):           cm  LVIDd (2D):   3.76  (3.4-5.7) LA Volume      14.8                 cm             Index         ml/m²  LVIDs (2D):   2.62            Right Ventricle:                 cm             TAPSE:           1.61 cm  LV FS (2D):   30.3   (>25%)                  %  Relative Wall 0.49   (<0.42)  Thickness    LV SYSTOLIC FUNCTION BY 2D PLANIMETRY (MOD):  EF-A4C View: 58.5 % EF-A2C View: 63.7 % EF-Biplane: 62 %    LV DIASTOLIC FUNCTION:  MV Peak E: 0.53 m/s E/e' Ratio: 3.30  MV Peak A: 0.63 m/s  E/A Ratio: 0.84    SPECTRAL DOPPLER ANALYSIS (where applicable):  Aortic Valve: AoV Max Christopher: 1.14 m/s AoV Peak P.2 mmHg AoV Mean P.7 mmHg    LVOT Vmax: 0.81 m/s LVOT VTI: 0.127 m LVOT Diameter: 1.95 cm    AoV Area, Vmax: 2.13 cm² AoV Area, VTI: 2.41 cm² AoV Area, Vmn: 2.22 cm²  Ao VTI: 0.158  Tricuspid Valve and PA/RV Systolic Pressure: TR Max Velocity: 2.13 m/s RA   Pressure: 3 mmHg RVSP/PASP: 21.1 mmHg      PHYSICIAN INTERPRETATION:  Left Ventricle: Endocardial visualization was enhanced with intravenous   echo contrast. The left ventricular internal cavity size is normal. Left   ventricular wall thickness is normal.  Global LV systolic function was normal. Left ventricular ejection   fraction, by visual estimation, is 55 to 60%. Spectral Doppler shows   normal pattern of LV diastolic filling.  Right Ventricle: The right ventricular size is normal. RV systolic   function is normal.  Left Atrium: Normal left atrial size.  Right Atrium: Normal right atrial size.  Pericardium: There is no evidence of pericardial effusion.  Mitral Valve: The mitral valve is normal in structure. Mitral leaflet   mobility is normal. Trace mitral valve regurgitation is seen.  Tricuspid Valve: The tricuspid valve is normal in structure. Trivial   tricuspid regurgitation is visualized.  Aortic Valve: The aortic valve is trileaflet. No evidence of aortic valve   regurgitation is seen.  Pulmonic Valve: The pulmonic valve is normal. Trace pulmonic valve   regurgitation.  Aorta: The aortic root and ascending aorta are structurally normal, with   no evidence of dilitation.  Pulmonary Artery: The main pulmonary artery is normal in size.  Venous: A normal flow pattern is recorded from the right upper pulmonary   vein. The inferior vena cava was normal sized, with respiratory size   variation greater than 50%.

## 2022-05-17 NOTE — PROGRESS NOTE ADULT - ASSESSMENT
32 yr old female w/ PMH of PCOS, Adrenal cyst, HTN, Asthma, GERD, Fibromyalgia, Transaminits,   who presents w/ 2 week history of HTN emergency /125 at home associated with headaches, pulses audible in her ears, facial flushing, profuse diaphoresis, and feeling of impending doom.   Pt is known to our practice and sees Dr. Vegas who started workup for pheochromocytoma. Pt has strong family history of malignant hypertension  w/ sudden cardiac deat    5/17/22 BP well controlled:  119/78 today. Plan:  d.C losartan, decreased Norvasc and add bb.   Planning to go down for MR/Pelvis ct.   Also a brain MRI is requested without contrast urgent.    TTE:  Ef 55-60%   32 yr old female w/ PMH of PCOS, Adrenal cyst, HTN, Asthma, GERD, Fibromyalgia, Transaminits,   who presents w/ 2 week history of HTN emergency /125 at home associated with headaches, pulses audible in her ears, facial flushing, profuse diaphoresis, and feeling of impending doom.   Pt is known to our practice and sees Dr. Vegas who started workup for pheochromocytoma. Pt has strong family history of malignant hypertension  w/ sudden cardiac deat    5/17/22 BP well controlled:  119/78 today. Plan:  d.C losartan, decreased Norvasc and add bb.   Planning to go down for MR/Pelvis ct.   Also a brain MRI is requested without contrast urgent.    TTE:  Ef 55-60%     32 yr old female w/ PMH of PCOS, Adrenal cyst, HTN, Asthma, GERD, Fibromyalgia, Transaminits,   who presents w/ 2 week history of HTN emergency /125 at home associated with headaches, pulses audible in her ears, facial flushing, profuse diaphoresis, and feeling of impending doom.   Pt is known to our practice and sees Dr. Vegas who started workup for pheochromocytoma. Pt has strong family history of malignant hypertension  w/ sudden cardiac deat    5/17/22 BP well controlled:  119/78 today. Plan:  d.C losartan, decreased Norvasc and add bb.   Planning to go down for MR/Pelvis ct.   Also a brain MRI is requested without contrast urgent.    TTE:  Ef 55-60%

## 2022-05-17 NOTE — PROGRESS NOTE ADULT - SUBJECTIVE AND OBJECTIVE BOX
Patient is a 32y old  Female who presents with a chief complaint of palpitations (16 May 2022 11:07)      SUBJECTIVE / OVERNIGHT EVENTS: no new events, continued episodes of diaphoresis, palpitations  Tele-sinus tach to 150s with minimal exertion (ADLs)    MEDICATIONS  (STANDING):  amLODIPine   Tablet 5 milliGRAM(s) Oral daily  DULoxetine 30 milliGRAM(s) Oral at bedtime  loratadine 10 milliGRAM(s) Oral at bedtime  metoprolol tartrate 12.5 milliGRAM(s) Oral every 6 hours  montelukast 10 milliGRAM(s) Oral at bedtime  pantoprazole    Tablet 40 milliGRAM(s) Oral before breakfast  spironolactone 150 milliGRAM(s) Oral at bedtime    MEDICATIONS  (PRN):  ALPRAZolam 1 milliGRAM(s) Oral at bedtime PRN anxiety  cyclobenzaprine 5 milliGRAM(s) Oral three times a day PRN Muscle Spasm  hydrALAZINE Injectable 10 milliGRAM(s) IV Push every 6 hours PRN SBP > 160      T(C): 36.7 (05-17-22 @ 06:39), Max: 37.1 (05-16-22 @ 22:07)  HR: 101 (05-17-22 @ 06:39) (101 - 132)  BP: 119/78 (05-17-22 @ 06:39) (119/78 - 132/78)  RR: 18 (05-17-22 @ 06:39) (17 - 18)  SpO2: 98% (05-17-22 @ 06:39) (98% - 98%)  CAPILLARY BLOOD GLUCOSE        I&O's Summary    16 May 2022 07:01  -  17 May 2022 07:00  --------------------------------------------------------  IN: 240 mL / OUT: 0 mL / NET: 240 mL        PHYSICAL EXAM:  GENERAL: NAD  HEAD:  Atraumatic, Normocephalic  EYES: EOMI, PERRLA, conjunctiva and sclera clear  NECK: Supple, No JVD  CHEST/LUNG: Clear to auscultation bilaterally; No wheeze  HEART: Regular rate and rhythm; No murmurs, rubs, or gallops  ABDOMEN: Soft, Nontender, Nondistended; Bowel sounds present  EXTREMITIES:  2+ Peripheral Pulses, No clubbing, cyanosis, or edema  PSYCH: AAOx3  NEUROLOGY: non-focal  SKIN: No rashes or lesions    LABS:                        12.1   9.61  )-----------( 426      ( 16 May 2022 07:50 )             36.2     05-16    139  |  100  |  10.3  ----------------------------<  112<H>  4.2   |  24.0  |  0.63    Ca    9.0      16 May 2022 07:50  Mg     1.8     05-15    TPro  8.0  /  Alb  4.3  /  TBili  0.4  /  DBili  x   /  AST  106<H>  /  ALT  148<H>  /  AlkPhos  119  05-15    PT/INR - ( 15 May 2022 14:30 )   PT: 13.1 sec;   INR: 1.13 ratio           CARDIAC MARKERS ( 15 May 2022 14:30 )  x     / <0.01 ng/mL / x     / x     / x              RADIOLOGY & ADDITIONAL TESTS:    Imaging Personally Reviewed:    Consultant(s) Notes Reviewed:      Care Discussed with Consultants/Other Providers:

## 2022-05-18 ENCOUNTER — FORM ENCOUNTER (OUTPATIENT)
Age: 32
End: 2022-05-18

## 2022-05-18 ENCOUNTER — TRANSCRIPTION ENCOUNTER (OUTPATIENT)
Age: 32
End: 2022-05-18

## 2022-05-18 VITALS — SYSTOLIC BLOOD PRESSURE: 136 MMHG | HEART RATE: 86 BPM | DIASTOLIC BLOOD PRESSURE: 90 MMHG

## 2022-05-18 LAB
CORTIS 24H UR-MCNC: 24 H
CORTIS 24H UR-MRATE: 54 MCG/24 H
CREAT UR-MCNC: 139.9 MG/DL
HAV IGM SER-ACNC: SIGNIFICANT CHANGE UP
HBV CORE IGM SER-ACNC: SIGNIFICANT CHANGE UP
HBV SURFACE AG SER-ACNC: SIGNIFICANT CHANGE UP
HCV AB S/CO SERPL IA: 0.13 S/CO — SIGNIFICANT CHANGE UP (ref 0–0.99)
HCV AB SERPL-IMP: SIGNIFICANT CHANGE UP
METANEPHS 24H UR-MCNC: 144 UG/L
METANEPHS/CREAT UR: 0.4
MITOCHONDRIA AB SER-ACNC: SIGNIFICANT CHANGE UP
NORMETANEPHRINE 24H UR-MCNC: 307 UG/L
SMOOTH MUSCLE AB SER-ACNC: SIGNIFICANT CHANGE UP
SPECIMEN VOL 24H UR: 1200 ML

## 2022-05-18 PROCEDURE — 86376 MICROSOMAL ANTIBODY EACH: CPT

## 2022-05-18 PROCEDURE — 85027 COMPLETE CBC AUTOMATED: CPT

## 2022-05-18 PROCEDURE — 93005 ELECTROCARDIOGRAM TRACING: CPT

## 2022-05-18 PROCEDURE — 93306 TTE W/DOPPLER COMPLETE: CPT

## 2022-05-18 PROCEDURE — U0003: CPT

## 2022-05-18 PROCEDURE — 82384 ASSAY THREE CATECHOLAMINES: CPT

## 2022-05-18 PROCEDURE — 80053 COMPREHEN METABOLIC PANEL: CPT

## 2022-05-18 PROCEDURE — 99239 HOSP IP/OBS DSCHRG MGMT >30: CPT

## 2022-05-18 PROCEDURE — 82533 TOTAL CORTISOL: CPT

## 2022-05-18 PROCEDURE — 70551 MRI BRAIN STEM W/O DYE: CPT

## 2022-05-18 PROCEDURE — 84480 ASSAY TRIIODOTHYRONINE (T3): CPT

## 2022-05-18 PROCEDURE — 84443 ASSAY THYROID STIM HORMONE: CPT

## 2022-05-18 PROCEDURE — 87637 SARSCOV2&INF A&B&RSV AMP PRB: CPT

## 2022-05-18 PROCEDURE — 80048 BASIC METABOLIC PNL TOTAL CA: CPT

## 2022-05-18 PROCEDURE — 84484 ASSAY OF TROPONIN QUANT: CPT

## 2022-05-18 PROCEDURE — 82088 ASSAY OF ALDOSTERONE: CPT

## 2022-05-18 PROCEDURE — 36415 COLL VENOUS BLD VENIPUNCTURE: CPT

## 2022-05-18 PROCEDURE — U0005: CPT

## 2022-05-18 PROCEDURE — 80074 ACUTE HEPATITIS PANEL: CPT

## 2022-05-18 PROCEDURE — 86381 MITOCHONDRIAL ANTIBODY EACH: CPT

## 2022-05-18 PROCEDURE — 76705 ECHO EXAM OF ABDOMEN: CPT

## 2022-05-18 PROCEDURE — 83520 IMMUNOASSAY QUANT NOS NONAB: CPT

## 2022-05-18 PROCEDURE — 71045 X-RAY EXAM CHEST 1 VIEW: CPT

## 2022-05-18 PROCEDURE — 80061 LIPID PANEL: CPT

## 2022-05-18 PROCEDURE — 93975 VASCULAR STUDY: CPT

## 2022-05-18 PROCEDURE — 83835 ASSAY OF METANEPHRINES: CPT

## 2022-05-18 PROCEDURE — 82787 IGG 1 2 3 OR 4 EACH: CPT

## 2022-05-18 PROCEDURE — 72197 MRI PELVIS W/O & W/DYE: CPT

## 2022-05-18 PROCEDURE — 85610 PROTHROMBIN TIME: CPT

## 2022-05-18 PROCEDURE — 80307 DRUG TEST PRSMV CHEM ANLYZR: CPT

## 2022-05-18 PROCEDURE — 85025 COMPLETE CBC W/AUTO DIFF WBC: CPT

## 2022-05-18 PROCEDURE — 84436 ASSAY OF TOTAL THYROXINE: CPT

## 2022-05-18 PROCEDURE — 82570 ASSAY OF URINE CREATININE: CPT

## 2022-05-18 PROCEDURE — 86255 FLUORESCENT ANTIBODY SCREEN: CPT

## 2022-05-18 PROCEDURE — 84702 CHORIONIC GONADOTROPIN TEST: CPT

## 2022-05-18 PROCEDURE — 82024 ASSAY OF ACTH: CPT

## 2022-05-18 PROCEDURE — 99285 EMERGENCY DEPT VISIT HI MDM: CPT

## 2022-05-18 PROCEDURE — 83036 HEMOGLOBIN GLYCOSYLATED A1C: CPT

## 2022-05-18 PROCEDURE — 85379 FIBRIN DEGRADATION QUANT: CPT

## 2022-05-18 PROCEDURE — 83735 ASSAY OF MAGNESIUM: CPT

## 2022-05-18 PROCEDURE — 84244 ASSAY OF RENIN: CPT

## 2022-05-18 PROCEDURE — 99232 SBSQ HOSP IP/OBS MODERATE 35: CPT

## 2022-05-18 PROCEDURE — 74183 MRI ABD W/O CNTR FLWD CNTR: CPT

## 2022-05-18 RX ORDER — METOPROLOL TARTRATE 50 MG
1 TABLET ORAL
Qty: 30 | Refills: 0
Start: 2022-05-18 | End: 2022-06-16

## 2022-05-18 RX ORDER — SPIRONOLACTONE 25 MG/1
6 TABLET, FILM COATED ORAL
Qty: 180 | Refills: 0
Start: 2022-05-18 | End: 2022-06-16

## 2022-05-18 RX ORDER — METOPROLOL TARTRATE 50 MG
50 TABLET ORAL DAILY
Refills: 0 | Status: DISCONTINUED | OUTPATIENT
Start: 2022-05-18 | End: 2022-05-18

## 2022-05-18 RX ORDER — LORATADINE 10 MG/1
1 TABLET ORAL
Qty: 30 | Refills: 0
Start: 2022-05-18 | End: 2022-06-16

## 2022-05-18 RX ADMIN — PANTOPRAZOLE SODIUM 40 MILLIGRAM(S): 20 TABLET, DELAYED RELEASE ORAL at 06:10

## 2022-05-18 RX ADMIN — Medication 12.5 MILLIGRAM(S): at 06:11

## 2022-05-18 RX ADMIN — AMLODIPINE BESYLATE 5 MILLIGRAM(S): 2.5 TABLET ORAL at 06:10

## 2022-05-18 NOTE — PROGRESS NOTE ADULT - SUBJECTIVE AND OBJECTIVE BOX
Patient is a 32y old  Female who presents with a chief complaint of palpitations (17 May 2022 10:33)      SUBJECTIVE / OVERNIGHT EVENTS: no new events, feeling better, tolerating beta blocker    MEDICATIONS  (STANDING):  amLODIPine   Tablet 5 milliGRAM(s) Oral daily  DULoxetine 30 milliGRAM(s) Oral at bedtime  loratadine 10 milliGRAM(s) Oral at bedtime  metoprolol succinate ER 50 milliGRAM(s) Oral daily  montelukast 10 milliGRAM(s) Oral at bedtime  pantoprazole    Tablet 40 milliGRAM(s) Oral before breakfast  spironolactone 150 milliGRAM(s) Oral at bedtime    MEDICATIONS  (PRN):  ALPRAZolam 1 milliGRAM(s) Oral at bedtime PRN anxiety  cyclobenzaprine 5 milliGRAM(s) Oral three times a day PRN Muscle Spasm  hydrALAZINE Injectable 10 milliGRAM(s) IV Push every 6 hours PRN SBP > 160      T(C): 36.6 (05-18-22 @ 10:22), Max: 36.8 (05-17-22 @ 23:56)  HR: 8 (05-18-22 @ 10:22) (8 - 130)  BP: 123/87 (05-18-22 @ 10:22) (110/72 - 133/96)  RR: 18 (05-18-22 @ 10:22) (17 - 19)  SpO2: 96% (05-18-22 @ 10:22) (95% - 98%)  CAPILLARY BLOOD GLUCOSE        I&O's Summary    17 May 2022 07:01  -  18 May 2022 07:00  --------------------------------------------------------  IN: 1200 mL / OUT: 1050 mL / NET: 150 mL        PHYSICAL EXAM:  GENERAL: NAD  HEAD:  Atraumatic, Normocephalic  EYES: EOMI, PERRLA, conjunctiva and sclera clear  NECK: Supple, No JVD  CHEST/LUNG: Clear to auscultation bilaterally; No wheeze  HEART: Regular rate and rhythm; No murmurs, rubs, or gallops  ABDOMEN: Soft, Nontender, Nondistended; Bowel sounds present  EXTREMITIES:  2+ Peripheral Pulses, No clubbing, cyanosis, or edema  PSYCH: AAOx3  NEUROLOGY: non-focal  SKIN: No rashes or lesions    LABS:                    RADIOLOGY & ADDITIONAL TESTS:    Imaging Personally Reviewed:    Consultant(s) Notes Reviewed:      Care Discussed with Consultants/Other Providers:

## 2022-05-18 NOTE — PROGRESS NOTE ADULT - PROBLEM SELECTOR PLAN 1
- Bp is now controlled, HR also controlled  - adrenal mass is stable and read on MRI as lipid rich adenoma   - cortisol levels are normal  - ACTH is low in setting of spironolactone use   - Aldosterone is high which typically should be lower in aldactone use   - will need outpatient workup w/ Endocrine - Dr. Moulton   - would likely benefit from genetic testing/genome mapping for Hereditary Paraganglioma and Pheochromocytoma-like syndromes (PGL/PCC) as well as other genetic diseases that cause HTN emergencies.   - continue amlodipine 5mg daily  - transition to toprol 50mg daily   - spironolactone 150mg   - no renal artery stenosis noted.   - no further inpatient cardiac workup or recommendations  - will sign off, pt needs outpatient endocrine within 1 week and outpatient cardiology visit with Dr. Vegas within 1-2 weeks.

## 2022-05-18 NOTE — DISCHARGE NOTE NURSING/CASE MANAGEMENT/SOCIAL WORK - NSDCPEFALRISK_GEN_ALL_CORE
For information on Fall & Injury Prevention, visit: [email or web address] OR  [email or web address] OR  https://www.cdc.gov/steadi/patient.html

## 2022-05-18 NOTE — DISCHARGE NOTE PROVIDER - NSDCFUSCHEDAPPT_GEN_ALL_CORE_FT
Yasmani Hughes  [facility]  ONCORTHO [address]  Scheduled Appointment: 05/25/2022    Ivan Pearson  [facility]  Cardio [address]  Scheduled Appointment: 06/14/2022    Arnulfo Obrien  [facility]  PHYSMED [address]  Scheduled Appointment: 06/22/2022

## 2022-05-18 NOTE — DISCHARGE NOTE PROVIDER - NSDCCPCAREPLAN_GEN_ALL_CORE_FT
PRINCIPAL DISCHARGE DIAGNOSIS  Diagnosis: Hypertension  Assessment and Plan of Treatment: Continue current medications as prescribed.  Follow up with primary doctor and cardiology.      SECONDARY DISCHARGE DIAGNOSES  Diagnosis: Hepatic steatosis  Assessment and Plan of Treatment: Follow up with hepatology.    Diagnosis: High aldosterone  Assessment and Plan of Treatment: Follow up with endocrinology.

## 2022-05-18 NOTE — DISCHARGE NOTE PROVIDER - CARE PROVIDERS DIRECT ADDRESSES
,DirectAddress_Unknown,[email or web address],DirectAddress_Unknown

## 2022-05-18 NOTE — PROGRESS NOTE ADULT - ASSESSMENT
SUBJECTIVE:  The patient is a 72-year-old female who presents to the Urgent Care with nasal congestion and cough.  On October 3rd patient developed nasal congestion and cough.  She was quite fatigued last week as well.  She does feel that her symptoms are improving.  However her  developed these symptoms 3 days ago.  He came today to the clinic and he tested positive for COVID.  Patient is here now because she is concerned that this could have been COVID and not just a head cold.  Patient is vaccinated against COVID-19, she has never had COVID.  She denies any fevers, body aches, vomiting, or diarrhea this past week.     Past Medical History:  Problem list: Reviewed and updated  Allergies: Reviewed and updated  Medication list: Reviewed and updated    Social History:  Patient does not use tobacco products.     OBJECTIVE:  Vitals: Reviewed in chart, temp 97.6  GENERAL: Alert, comfortable, not toxic, and in no acute distress  HEAD: Head is normocephalic without tics or tremors.   NOSE: No rhinorrhea is present   EYES: Conjunctivae and sclerae are without erythema or discharge.  EARS: External ears and canals are normal.  TMs are normal with bright light reflex present.  THROAT: Normal oropharynx, without exudates or petechia  NECK: Supple without lymphadenopathy or masses  LUNGS: Clear to auscultation, without no wheezing, crackles, or stridor. Pulse ox 100% on room air. Non labored breathing, no use of accessory muscles.   HEART: Regular rate and rhythm, without murmurs, gallops, or rubs    LABS:   COVID PCR: pending     ASSESSMENT:  1) nasal congestion, fatigue, and cough with exposure to COVID x9 days - suspect COVID    PLAN:  1) patient has flu-like symptoms and her  currently has COVID.  I suspect the patient has COVID.  I do not see signs of a bacterial infection.  She will remain quarantine until her COVID PCR test result is in the chart.      Patient was encouraged to continue with Tylenol,  plenty of rest and fluids. If at any time symptoms worsen or new symptoms began they should go directly to the emergency department.    Мария Clark PA-C  Working under the direct supervision of Dr. Rigoberto Wolf     32 yr old female w/ PMH of PCOS, Adrenal cyst, HTN, Asthma, GERD, Fibromyalgia, Transaminits,   who presents w/ 2 week history of HTN emergency /125 at home associated with headaches, pulses audible in her ears, facial flushing, profuse diaphoresis, and feeling of impending doom.   Pt is known to our practice and sees Dr. Vegas who started workup for pheochromocytoma. Pt has strong family history of malignant hypertension  w/ sudden cardiac deat    5/17/22 BP well controlled:  119/78 today. Plan:  d.C losartan, decreased Norvasc and add bb.   Planning to go down for MR/Pelvis ct.   Also a brain MRI is requested without contrast urgent.    TTE:  Ef 55-60%

## 2022-05-18 NOTE — DISCHARGE NOTE PROVIDER - PROVIDER TOKENS
FREE:[LAST:[Primary doctor],PHONE:[(   )    -],FAX:[(   )    -]],PROVIDER:[TOKEN:[[id number]]],PROVIDER:[TOKEN:[[id number]]],FREE:[LAST:[Hepatology],PHONE:[(   )    -],FAX:[(   )    -]]

## 2022-05-18 NOTE — PROGRESS NOTE ADULT - NS ATTEND AMEND GEN_ALL_CORE FT
pt seen and examined  plan of care dw ACP. She is doing better, BP is better controlled.  Will resume aldactone. I expected K to be higher than what it is on aldactone.  Workup for adrenal adenoma as well as pheo is pending  MR abdomen and pelvis with and without contrast.  We will follow with you.
Pt seen and examined  labs reiveiwed.  MR head no abnormality  Pt with fatty liver and needs to see hepatology as oupt  Thus far no pheo. Urine is pending  She has a fatty adrenal adenoma with possible primary hyperaldo and low ACTH  Cont with aldactone  needs to see endocrinologist for further evaluation   she is stable for discharge and will see me in the office in the next week or two  spoke with NP and pt in length.
Sarah is feeling diaphoretic and is tachycardic. Her BP is better controlled.  Fhx of Autoimmune diseases and she also has psoriatic arthritis.  Blood test ordered for hepatitis and autoimmune hepatits.   Labs from [facility] lab are still pending.  Despite being on good dose of aldactone, aldosterone is elevated.   ACTH is low in setting of diuretic use  MR head is ordered for pituitary mass  Cortisol  is normal  Awaiting for MR abdomen and pelvis  decreased norvasc to 5 mg and dc losartan  added metoprolol for Hr control  I will follow with you

## 2022-05-18 NOTE — DISCHARGE NOTE PROVIDER - HOSPITAL COURSE
41F with a history of polycystic ovarian syndrome, adrenal cyst, hypertension, asthma, GERD, psoriatic arthritis, and transaminitis who presented with palpitations and lightheadedness found to have uncontrolled hypertension.  24 hour urine collection for metanephrines reported negative from outpatient office.  Beta blocker started, continue Norvasc at 5mg, ARB d/c'd.  Echocardiogram with normal LV function, size, no hypertrophy.  MRI results noted, small 8 mm left adrenal nodule, unchanged.  Transaminitis noted on prior history with history of hepatic steatosis.  Will f/u with hepatology outpatient.  Aldosterone levels elevated, will need outpatient follow up with endocrinology.  Patient improved and stable for discharge to home with outpatient follow up with primary doctor, cardiology, hepatology, and endocrinology.

## 2022-05-18 NOTE — PROGRESS NOTE ADULT - SUBJECTIVE AND OBJECTIVE BOX
[facility]                                                         CARDIOLOGY AT [facility]                                                                  [address], [place] [address]                                                         Telephone: [phone]. Fax:[phone]                                                                             PROGRESS NOTE    Reason for follow up: HTN Emergency, hyperaldosteronism   Update: stable for DC w/ endocrine f/u w/ Dr. Moulton       Review of symptoms:   Cardiac:  No chest pain. No dyspnea. No palpitations.  Respiratory: no cough. No dyspnea  Gastrointestinal: No diarrhea. No abdominal pain. No bleeding.   Neuro: No focal neuro complaints.    Vitals:  T(C): 36.6 (05-18-22 @ 10:22), Max: 36.8 (05-17-22 @ 23:56)  HR: 8 (05-18-22 @ 10:22) (8 - 108)  BP: 123/87 (05-18-22 @ 10:22) (110/72 - 133/96)  RR: 18 (05-18-22 @ 10:22) (17 - 19)  SpO2: 96% (05-18-22 @ 10:22) (95% - 98%)  Wt(kg): --  I&O's Summary    17 May 2022 07:01  -  18 May 2022 07:00  --------------------------------------------------------  IN: 1200 mL / OUT: 1050 mL / NET: 150 mL      Weight (kg): 90.7 (05-15 @ 12:56)    PHYSICAL EXAM:  Appearance: Comfortable. No acute distress  HEENT:  Atraumatic. Normocephalic.  Normal oral mucosa  Neurologic: A & O x 3, no gross focal deficits.  Cardiovascular: RRR S1 S2, No murmur, no rubs/gallops. No JVD  Respiratory: Lungs clear to auscultation, unlabored   Gastrointestinal:  Soft, Non-tender, + BS  Lower Extremities: 2+ Peripheral Pulses, No clubbing, cyanosis, or edema  Psychiatry: Patient is calm. No agitation.   Skin: warm and dry.    CURRENT CARDIAC MEDICATIONS:  amLODIPine   Tablet 5 milliGRAM(s) Oral daily  hydrALAZINE Injectable 10 milliGRAM(s) IV Push every 6 hours PRN  metoprolol succinate ER 50 milliGRAM(s) Oral daily  spironolactone 150 milliGRAM(s) Oral at bedtime      CURRENT OTHER MEDICATIONS:  loratadine 10 milliGRAM(s) Oral at bedtime  montelukast 10 milliGRAM(s) Oral at bedtime  ALPRAZolam 1 milliGRAM(s) Oral at bedtime PRN anxiety  cyclobenzaprine 5 milliGRAM(s) Oral three times a day PRN Muscle Spasm  DULoxetine 30 milliGRAM(s) Oral at bedtime  pantoprazole    Tablet 40 milliGRAM(s) Oral before breakfast      LABS:	 	  ( 15 May 2022 14:30 )  Troponin T  <0.01,  CPK  X    , CKMB  X    , BNP X        PT/INR/PTT ( 15 May 2022 14:30 )                       :                       :      13.1         :       X                     .        .                   .              .           .       1.13        .                                       Lipid Profile: Date: 05-16 @ 07:50  Total cholesterol 221; Direct LDL: --; HDL: 50; Triglycerides:169    HgA1c:   TSH: Thyroid Stimulating Hormone, Serum: 0.51 uIU/mL    DIAGNOSTIC TESTING:  [ ] Echocardiogram:   < from: TTE Echo Complete w/o Contrast w/ Doppler (05.16.22 @ 13:25) >   1. Left ventricular ejection fraction, by visual estimation, is 55 to   60%.   2. Normal global left ventricular systolic function.   3. Normal left atrial size.   4. Normal right atrial size.   5. Trace mitral valve regurgitation.   6. Endocardial visualization was enhanced with intravenous echo contrast.    < end of copied text >    OTHER:   	  < from: MR Pelvis w/wo IV Cont (05.17.22 @ 14:26) >  IMPRESSION:    Hepatomegaly with severe diffuse hepatic steatosis    Left adrenal 8 mm lipid rich adenoma    < end of copied text >

## 2022-05-18 NOTE — DISCHARGE NOTE NURSING/CASE MANAGEMENT/SOCIAL WORK - PATIENT PORTAL LINK FT
You can access the FollowMyHealth Patient Portal offered by [facility] by registering at the following website: [email or web address]. By joining [employer] [facility]’s FollowMyHealth portal, you will also be able to view your health information using other applications (apps) compatible with our system.

## 2022-05-18 NOTE — DISCHARGE NOTE PROVIDER - NSDCMRMEDTOKEN_GEN_ALL_CORE_FT
ALPRAZolam 0.5 mg oral tablet: 1 tab(s) orally 2 times a day  amLODIPine 5 mg oral tablet: 1 tab(s) orally once a day  cyclobenzaprine 5 mg oral tablet: 1 tab(s) orally 3 times a day, As Needed  DULoxetine 30 mg oral delayed release capsule: 1 cap(s) orally once a day  fenofibrate 130 mg oral capsule: 1 cap(s) orally once a day  Humira 40 mg/0.8 mL subcutaneous kit: subcutaneous every 2 weeks  loratadine 10 mg oral tablet: 1 tab(s) orally once a day (at bedtime)  metoprolol succinate 50 mg oral tablet, extended release: 1 tab(s) orally once a day  montelukast 10 mg oral tablet: 1 tab(s) orally once a day (at bedtime)  omeprazole 40 mg oral delayed release capsule: 1 cap(s) orally once a day  spironolactone 25 mg oral tablet: 6 tab(s) orally once a day (at bedtime)

## 2022-05-18 NOTE — PROGRESS NOTE ADULT - PROBLEM SELECTOR PLAN 2
no evidence of pheochromocytoma   lipid rich adenoma which is benign   cortisol levels are also normal
CT abd pelvis pending  Labs as above  Ct brain without contrast stat today - d/w radiology

## 2022-05-18 NOTE — PROGRESS NOTE ADULT - ASSESSMENT
41F with a history of polycystic ovarian syndrome, adrenal cyst, hypertension, asthma, GERD, psoriatic arthritis, and transaminitis who presented with palpitations and lightheadedness found to have uncontrolled hypertension.    Hypertensive urgency / Tachycardia   -improving  -24 hour urine collection for metanephrines reported negative from outpatient office  -Beta blocker started, continue Norvasc at 5mg, ARB d/c'd   -echocardiogram with normal LV function, size, no hypertrophy  -MRI results noted, small 8 mm left adrenal nodule, unchanged  -stable for d/c, d/w cards NP    Asthma - No dyspnea, wheezing, or hypoxia on examination.    Anxiety - On duloxetine with alprazolam as needed.    GERD - On pantoprazole. Diet as tolerated.    Transaminitis - Noted on prior history with history of hepatic steatosis.  Outpatient f/u with hepatology    Endo- outpatient f/u regarding elevated alex levels    for d/c today, total d/c time 40 minutes

## 2022-05-19 LAB — LKM AB SER-ACNC: <20.1 UNITS — SIGNIFICANT CHANGE UP (ref 0–20)

## 2022-05-20 LAB
CREATININE, RNDM UR: 51.9 MG/DL — SIGNIFICANT CHANGE UP
IGG SERPL-MCNC: 1276 MG/DL — SIGNIFICANT CHANGE UP (ref 586–1602)
IGG1 SER-MCNC: 732 MG/DL — SIGNIFICANT CHANGE UP (ref 248–810)
IGG2 SER-MCNC: 384 MG/DL — SIGNIFICANT CHANGE UP (ref 130–555)
IGG3 SER-MCNC: 31 MG/DL — SIGNIFICANT CHANGE UP (ref 15–102)
IGG4 SER-MCNC: 28 MG/DL — SIGNIFICANT CHANGE UP (ref 2–96)
METANEPHRINE, PL: 50.5 PG/ML
METANEPHS 24H UR-MCNC: 0.7 — SIGNIFICANT CHANGE UP (ref 0–1)
METANEPHS 24H UR-MCNC: 76 UG/L — SIGNIFICANT CHANGE UP
NORMETANEPHRINE, PL: 89.5 PG/ML
NORMETANEPHRINE.: 264 UG/L — SIGNIFICANT CHANGE UP
RENIN ACTIVITY, PLASMA: 4.47 NG/ML/HR

## 2022-05-21 LAB
DOPAMINE UR-MCNC: 293 UG/L
DOPAMINE, UR, 24HR: 352 UG/24 HR
EPINEPH UR-MCNC: 8 UG/L
EPINEPHRINE, U, 24HR: 10 UG/24 HR
METANEPHRINE, PL: 40.4 PG/ML — SIGNIFICANT CHANGE UP (ref 0–88)
NOREPINEPH UR-MCNC: 37 UG/L
NOREPINEPHRINE,U,24H: 44 UG/24 HR
NORMETANEPHRINE, PL: 243.5 PG/ML — HIGH (ref 0–210.1)

## 2022-05-24 LAB
RENIN PLAS-CCNC: 11.46 NG/ML/HR — HIGH (ref 0.17–5.38)
SOLUBLE LIVER IGG SER IA-ACNC: 2.5 — SIGNIFICANT CHANGE UP (ref 0–20)

## 2022-05-25 ENCOUNTER — NON-APPOINTMENT (OUTPATIENT)
Age: 32
End: 2022-05-25

## 2022-05-25 ENCOUNTER — APPOINTMENT (OUTPATIENT)
Dept: ORTHOPEDIC SURGERY | Facility: CLINIC | Age: 32
End: 2022-05-25
Payer: COMMERCIAL

## 2022-05-25 PROCEDURE — 99024 POSTOP FOLLOW-UP VISIT: CPT | Mod: NC

## 2022-05-25 NOTE — RETURN TO WORK/SCHOOL
[FreeTextEntry1] : Pt. has been cleared to return to work full duty without restrictions or limitations as of 6/7/22.\par

## 2022-05-25 NOTE — PHYSICAL EXAM
[NL (40)] : plantar flexion 40 degrees [2+] : posterior tibialis pulse: 2+ [Normal] : saphenous nerve sensation normal [Right] : right ankle [There are no fractures, subluxations or dislocations. No significant abnormalities are seen] : There are no fractures, subluxations or dislocations. No significant abnormalities are seen [5___] : plantar flexion 5[unfilled]/5 [4___] : eversion 4[unfilled]/5 [] : no pain when stressing lateral tarsal metatarsal joint [FreeTextEntry8] : Mild tenderness over lateral ligaments.  [de-identified] : inversion 20 degrees [de-identified] : eversion 10 degrees [TWNoteComboBox7] : dorsiflexion 15 degrees

## 2022-05-25 NOTE — HISTORY OF PRESENT ILLNESS
[Right Leg] : right leg [Sudden] : sudden [3] : 3 [2] : 2 [Dull/Aching] : dull/aching [Localized] : localized [Throbbing] : throbbing [Occasional] : occasional [Rest] : rest [Sitting] : sitting [Standing] : standing [Walking] : walking [Stairs] : stairs [Not working due to injury] : Work status: not working due to injury [de-identified] : Pt. returns for f/u RT ankle s/p arthroscopy with extensive debridement; Brostrom-Ogden lateral ligament reconstruction 3/22/22. She has been WB in [facility] with supportive footwear. Doing well. She is improved compared to last visit.  [] : This patient has had an injection before: no [FreeTextEntry1] : R ankle [FreeTextEntry5] : pt is a 31 y/o RHD fem in for eval of the R ankle s/p Ankle SX pt states she fell and heard a pop. pt states pain started on 4/26/22. 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

## 2022-05-25 NOTE — ASSESSMENT
[FreeTextEntry1] : WBAT in supportive footwear, continue with brace.\par Continue PT.\par Ice to affected area.\par

## 2022-05-27 ENCOUNTER — APPOINTMENT (OUTPATIENT)
Dept: FAMILY MEDICINE | Facility: CLINIC | Age: 32
End: 2022-05-27
Payer: COMMERCIAL

## 2022-05-27 VITALS
DIASTOLIC BLOOD PRESSURE: 99 MMHG | HEIGHT: 65 IN | WEIGHT: 200 LBS | RESPIRATION RATE: 16 BRPM | BODY MASS INDEX: 33.32 KG/M2 | SYSTOLIC BLOOD PRESSURE: 138 MMHG | HEART RATE: 98 BPM

## 2022-05-27 DIAGNOSIS — Z09 ENCOUNTER FOR FOLLOW-UP EXAMINATION AFTER COMPLETED TREATMENT FOR CONDITIONS OTHER THAN MALIGNANT NEOPLASM: ICD-10-CM

## 2022-05-27 PROCEDURE — 99495 TRANSJ CARE MGMT MOD F2F 14D: CPT

## 2022-05-27 RX ORDER — CETIRIZINE HYDROCHLORIDE 10 MG/1
10 TABLET, COATED ORAL
Refills: 0 | Status: ACTIVE | COMMUNITY

## 2022-05-27 NOTE — PHYSICAL EXAM
[Regular Rhythm] : with a regular rhythm [Normal S1, S2] : normal S1 and S2 [No Murmur] : no murmur heard [Coordination Grossly Intact] : coordination grossly intact [No Focal Deficits] : no focal deficits [Normal Gait] : normal gait [Normal] : affect was normal and insight and judgment were intact [de-identified] : borderline tachycardic

## 2022-05-27 NOTE — PLAN
[FreeTextEntry1] : \par Hospital follow up due to Hypertensive crisis\par [facility] records reviewed\par Blood pressure still elevated and patient tachycardic\par Continue Amlodipine 5 mg QD\par Increasing metoprolol ER form 50 gm QD to 100 mg QD\par WIll see cardio next week\par Per HIE records spot metanephrines negative, 24 hrs cortisol was elevated. Echocardiogram with normal LV function, size, no hypertrophy. MRI results noted, small 8 mm left adrenal nodule, unchanged. Aldosterone levels were elevated, was advised to follow up with endocrinology outpatient.\par Has pending 24 hrs metanephrines and salivary midnight cortisol per endocrinology recommendations. If abnormal, they will expedite her appointment\par \par Transaminitis\par Has hepatic steatosis on abd US from dec/2021\par Was off fenofibrate micronized 130 mg for a few weeks, was recently restarted in the hospital\par Repeating LFTs and if abnormal will hold fenofibrate and consider restart fenofibrate 67 mcg in the future, which she tolerated with mild transaminitis only\par Has pending appointment with hepatology\par \par This was an extensive visit that included review of previous labs and specialists notes, [facility]IE records before and after the face to face encounter \par Return to care: within 3 months or within 2 weeks for blood pressure follow up if she doesn't see cardiology soon, or earlier if needed\par Call or return for any questions

## 2022-05-27 NOTE — HISTORY OF PRESENT ILLNESS
[FreeTextEntry1] : follow up, back pain [de-identified] : Ms. COURTNEY DEY is a pleasant 31 year old female with PMH of asthma, last crisis 2 years ago, never intubated, PCOS on spironolactone, anxiety and depression on Cimbalta, Xanax QHS, and FM or RA? ankylosing spondylitis following up with rheum, herniated lumbar and cervical disks following up with PMR, pre-DM, HLD, transaminitis. She is here for follow up in hospital discharge.\par She was at [facility] from 05/15/2022 to 05/18/2022 for hypertensive crisis. Per hospital records in [facility] spot metanephrines negative, 24 hrs cortisol was elevated. Echocardiogram with normal LV function, size, no hypertrophy. MRI results noted, small 8 mm left adrenal nodule, unchanged. Aldosterone levels were elevated, was advised to follow up with endocrinology outpatient.\par She was discharged on metoprolol ER 50  Qd and amlodipine 5 mg QD. She feels better but still has intermittent palpitations and her home diastolic blood pressures are in the high 90s. Denies chest pain, leg swelling, shortness of breath\par \par Was advised to follow up with hepatology for persistent transaminitis and hepatic steatosis. 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

## 2022-06-02 LAB
CORTIS SAL-MCNC: NORMAL
METANEPH 24H UR-MRATE: 153 UG/24 HR
METANEPHS 24H UR-MCNC: 99 UG/L
NORMETANEPHRINE 24 HR URINE: 725 UG/24 HR
NORMETANEPHRINE 24H UR-MCNC: 468 UG/L

## 2022-06-03 ENCOUNTER — APPOINTMENT (OUTPATIENT)
Dept: FAMILY MEDICINE | Facility: CLINIC | Age: 32
End: 2022-06-03
Payer: COMMERCIAL

## 2022-06-03 PROCEDURE — ZZZZZ: CPT

## 2022-06-07 LAB — CORTIS SAL-MCNC: NORMAL

## 2022-06-09 ENCOUNTER — APPOINTMENT (OUTPATIENT)
Dept: CARDIOLOGY | Facility: CLINIC | Age: 32
End: 2022-06-09
Payer: COMMERCIAL

## 2022-06-09 VITALS — DIASTOLIC BLOOD PRESSURE: 78 MMHG | SYSTOLIC BLOOD PRESSURE: 134 MMHG

## 2022-06-09 DIAGNOSIS — E26.9 HYPERALDOSTERONISM, UNSPECIFIED: ICD-10-CM

## 2022-06-09 DIAGNOSIS — M25.473 EFFUSION, UNSPECIFIED ANKLE: ICD-10-CM

## 2022-06-09 PROCEDURE — 99213 OFFICE O/P EST LOW 20 MIN: CPT

## 2022-06-11 LAB
ANION GAP SERPL CALC-SCNC: 15 MMOL/L
BUN SERPL-MCNC: 8 MG/DL
CALCIUM SERPL-MCNC: 9.6 MG/DL
CHLORIDE SERPL-SCNC: 101 MMOL/L
CO2 SERPL-SCNC: 23 MMOL/L
CREAT SERPL-MCNC: 0.63 MG/DL
EGFR: 122 ML/MIN/1.73M2
GLUCOSE SERPL-MCNC: 111 MG/DL
MAGNESIUM SERPL-MCNC: 1.9 MG/DL
POTASSIUM SERPL-SCNC: 4.2 MMOL/L
SODIUM SERPL-SCNC: 139 MMOL/L

## 2022-06-12 PROBLEM — E26.9 HYPERALDOSTERONISM, UNSPECIFIED: Status: ACTIVE | Noted: 2022-06-12

## 2022-06-12 NOTE — ASSESSMENT
[FreeTextEntry1] : Patient has elevated urine cortisol and metanephrines.  In addition serum aldosterone is elevated.  \par She is trying to make an appointment and see endocrinologist.\par Meanwhile she is on Aldactone 150 mg.  The reason for leg edema may be due to amlodipine.  Decrease amlodipine to 5 mg.  Increase Aldactone to 200 mg daily.\par BMP is ordered.\par Keep legs elevated as much as possible.\par Follow-up with me next week either by phone call or come into the office for further evaluation.\par

## 2022-06-12 NOTE — REVIEW OF SYSTEMS
[Weight Gain (___ Lbs)] : [unfilled] ~Ulb weight gain [Feeling Fatigued] : feeling fatigued [Seeing Double (Diplopia)] : no diplopia [Discharge From Ears] : no discharge from the ears [Tinnitus] : no tinnitus [SOB] : no shortness of breath [Dyspnea on exertion] : not dyspnea during exertion [Lower Ext Edema] : lower extremity edema [Palpitations] : no palpitations [Cough] : no cough [Wheezing] : no wheezing [Abdominal Pain] : no abdominal pain [Nausea] : no nausea [Change in Appetite] : no change in appetite [Change In The Stool] : no change in stool [Dysuria] : no dysuria [Myalgia] : no myalgia [Rash] : no rash [Dizziness] : no dizziness [Convulsions] : no convulsions [Confusion] : no confusion was observed [Under Stress] : not under stress [Easy Bleeding] : no tendency for easy bleeding

## 2022-06-12 NOTE — HISTORY OF PRESENT ILLNESS
[FreeTextEntry1] : Patient is seen and examined today.  She was admitted to the hospital due to elevated blood pressure was found to have an adrenal adenoma.  Aldosterone was elevated.  MRI of the abdomen and pelvis was performed.  Patient is on aldosterone antagonist and was advised to see endocrinologist for further evaluation.  Also urine metanephrine is elevated at 725 UG per 24 hours.  24-hour urine cortisol was also elevated at 54 mcg per 24 hours.\par \par She contacted me today due to lower extremity edema.  She has pitting edema that she wakes up in the morning and gets worse as the day goes on.  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  No chest pain or shortness of breath.

## 2022-06-12 NOTE — PHYSICAL EXAM
[Well Developed] : well developed [Normal Conjunctiva] : normal conjunctiva [Normal S1, S2] : normal S1, S2 [Normal] : clear lung fields, good air entry, no respiratory distress [de-identified] : 2+ bilateral ankle and foot edema, pitting

## 2022-06-13 ENCOUNTER — APPOINTMENT (OUTPATIENT)
Dept: ORTHOPEDIC SURGERY | Facility: CLINIC | Age: 32
End: 2022-06-13
Payer: COMMERCIAL

## 2022-06-13 DIAGNOSIS — M24.271 DISORDER OF LIGAMENT, RIGHT ANKLE: ICD-10-CM

## 2022-06-13 DIAGNOSIS — Z98.890 OTHER SPECIFIED POSTPROCEDURAL STATES: ICD-10-CM

## 2022-06-13 LAB
ALBUMIN SERPL ELPH-MCNC: 4.3 G/DL
ALP BLD-CCNC: 129 U/L
ALT SERPL-CCNC: 124 U/L
AST SERPL-CCNC: 88 U/L
BILIRUB DIRECT SERPL-MCNC: 0.1 MG/DL
BILIRUB INDIRECT SERPL-MCNC: 0.1 MG/DL
BILIRUB SERPL-MCNC: 0.2 MG/DL
GGT SERPL-CCNC: 54 U/L
PROT SERPL-MCNC: 7.1 G/DL

## 2022-06-13 PROCEDURE — 99213 OFFICE O/P EST LOW 20 MIN: CPT

## 2022-06-13 RX ORDER — FENOFIBRATE 130 MG/1
130 CAPSULE ORAL DAILY
Qty: 90 | Refills: 0 | Status: DISCONTINUED | COMMUNITY
Start: 2022-03-18 | End: 2022-06-13

## 2022-06-13 NOTE — RETURN TO WORK/SCHOOL
[FreeTextEntry1] : The patient has been cleared to return to work, full duty and without limitations as of 6/20/22.\par

## 2022-06-13 NOTE — PHYSICAL EXAM
[Right] : right foot and ankle [NL (40)] : plantar flexion 40 degrees [2+] : posterior tibialis pulse: 2+ [Normal] : saphenous nerve sensation normal [Mild] : mild diffused ankle swelling [NL 30)] : inversion 30 degrees [NL (20)] : eversion 20 degrees [5___] : eversion 5[unfilled]/5 [] : no pain with resisted dorsiflexion from inverted position [FreeTextEntry9] : ROM is symmetrical.  [de-identified] : inversion 20 degrees [de-identified] : eversion 10 degrees [TWNoteComboBox7] : dorsiflexion 15 degrees

## 2022-06-13 NOTE — ASSESSMENT
[FreeTextEntry1] : WBAT in supportive footwear.\par Brace only when active.\par Ice to affected area.\par Continue PT.\par Compression stockings prn. \par

## 2022-06-13 NOTE — HISTORY OF PRESENT ILLNESS
[Right Leg] : right leg [Sudden] : sudden [Dull/Aching] : dull/aching [Localized] : localized [Throbbing] : throbbing [Rest] : rest [Sitting] : sitting [Standing] : standing [Walking] : walking [Stairs] : stairs [Not working due to injury] : Work status: not working due to injury [7] : 7 [3] : 3 [Frequent] : frequent [Leisure] : leisure [Social interactions] : social interactions [de-identified] : Pt. returns for f/u RT ankle s/p arthroscopy with extensive debridement; Brostrom-Ogden lateral ligament reconstruction 3/22/22. She has been WB in [facility] with supportive footwear. She continues to attend PT. Doing well. She is improved compared to last visit overall, but stood at a concert last week for about 4 hours and her ankle has been painful and swollen. She is being worked up by endocrinology for adrenal adenoma. WB in [facility].  [] : This patient has had an injection before: no [FreeTextEntry1] : R ankle [FreeTextEntry5] : pt is a 31 y/o RHD fem in for eval of the R ankle s/p Ankle SX pt states she fell and heard a pop. pt states pain started on 4/26/22. 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

## 2022-06-14 ENCOUNTER — APPOINTMENT (OUTPATIENT)
Dept: CARDIOLOGY | Facility: CLINIC | Age: 32
End: 2022-06-14

## 2022-06-17 ENCOUNTER — NON-APPOINTMENT (OUTPATIENT)
Age: 32
End: 2022-06-17

## 2022-06-21 ENCOUNTER — APPOINTMENT (OUTPATIENT)
Dept: ENDOCRINOLOGY | Facility: CLINIC | Age: 32
End: 2022-06-21
Payer: COMMERCIAL

## 2022-06-21 VITALS
WEIGHT: 200 LBS | SYSTOLIC BLOOD PRESSURE: 122 MMHG | HEIGHT: 65 IN | BODY MASS INDEX: 33.32 KG/M2 | DIASTOLIC BLOOD PRESSURE: 80 MMHG | TEMPERATURE: 97.6 F

## 2022-06-21 DIAGNOSIS — R73.03 PREDIABETES.: ICD-10-CM

## 2022-06-21 PROCEDURE — 99215 OFFICE O/P EST HI 40 MIN: CPT

## 2022-06-21 PROCEDURE — 99205 OFFICE O/P NEW HI 60 MIN: CPT

## 2022-06-21 NOTE — HISTORY OF PRESENT ILLNESS
[FreeTextEntry1] : Diagnosed with PCOS age 17, presenting with cystic acne, hirsutism, and irregular menses. Treated by gyn with OCP; during evaluation found to have a DHEAS level of 620. This prompted an adrenal CT which revealed a 8 mm cyst.\par OCP discontinued 1 year ago due to elevated triglycerides; since then menses have been irregular. Started on spironolactone 100 bid for the skin manifestations.\par , no current plans for pregnancy.\par \par PMH:\par obesity, with recent 30 pound weight gain\par hypertension, on amlodipine\par (episodes of exacerbated hypertension with anxiety and sweats. At least one episode of SVT documented in ER and placed on metoprolol)\par asthma\par psoriatic arthritis, on humira\par abnormal liver functions, prior radiology showing steatosis\par pre-diabetes\par \par family history:\par mother cholangiocarcinoma,  due to COVID. ? evaluated for von-Hippel \par \par social history:\par Pt. is an RN at [employer], 4 tower\par \par ROS:\par anxiety

## 2022-06-21 NOTE — ASSESSMENT
[FreeTextEntry1] : 1. Good evidence for PCOS, currently with oligomenorrhea and skin manifestations, with associated insulin resistance\par 2. Metabolic syndrome, with multiple manifestations including hyperlipidemia, hypertension, pre-diabetes, obesity, and likely fatty liver disease which appears concerning based on progressive liver enzyme elevations\par 3. Cushing's syndrome and primary hyperaldosteronism ruled out\par 4. pheochromocytoma ruled out by biochemical testing and lack of significant adrenal findings\par 5. Paraganglioma secreting norepinephrine, although rare, has not been completely ruled out. Plasma normetanephrines WNL makes this unlikely, however there was an elevation of urinary normetanephrines. If pt's mother in fact had von-hippel-lindau syndrome it is inherited as an autosomal dominant and can be associated with paragangliomas.\par \par Plan:\par 1. Encourage weight loss. Pt. is a candidate for drug therapy with ozempic (semaglutide), which may also be of benefit to liver\par 2. Refer for hepatology consult\par 3. repeat plasma and urinary metanephrines

## 2022-06-21 NOTE — REASON FOR VISIT
[Initial Evaluation] : an initial evaluation [Adrenal Evaluation/Adrenal Disorder] : adrenal evaluation/adrenal disorder [PCOS] : PCOS

## 2022-06-21 NOTE — PHYSICAL EXAM
[Alert] : alert [No Acute Distress] : no acute distress [Normal Sclera/Conjunctiva] : normal sclera/conjunctiva [EOMI] : extra ocular movement intact [Thyroid Not Enlarged] : the thyroid was not enlarged [No Thyroid Nodules] : no palpable thyroid nodules [No Respiratory Distress] : no respiratory distress [Clear to Auscultation] : lungs were clear to auscultation bilaterally [Normal Rate] : heart rate was normal [Regular Rhythm] : with a regular rhythm [No Stigmata of Cushings Syndrome] : no stigmata of Cushings Syndrome [Acanthosis Nigricans] : acanthosis nigricans present [Acne] : acne present [Cranial Nerves Intact] : cranial nerves 2-12 were intact [No Motor Deficits] : the motor exam was normal [No Tremors] : no tremors [Oriented x3] : oriented to person, place, and time [Normal Insight/Judgement] : insight and judgment were intact

## 2022-06-22 ENCOUNTER — APPOINTMENT (OUTPATIENT)
Dept: PHYSICAL MEDICINE AND REHAB | Facility: CLINIC | Age: 32
End: 2022-06-22

## 2022-06-22 ENCOUNTER — APPOINTMENT (OUTPATIENT)
Dept: ORTHOPEDIC SURGERY | Facility: CLINIC | Age: 32
End: 2022-06-22

## 2022-07-05 RX ORDER — SEMAGLUTIDE 1.34 MG/ML
2 INJECTION, SOLUTION SUBCUTANEOUS
Qty: 3 | Refills: 1 | Status: DISCONTINUED | COMMUNITY
Start: 2022-06-27 | End: 2022-07-05

## 2022-07-18 ENCOUNTER — APPOINTMENT (OUTPATIENT)
Dept: GASTROENTEROLOGY | Facility: CLINIC | Age: 32
End: 2022-07-18

## 2022-07-18 VITALS
HEART RATE: 97 BPM | RESPIRATION RATE: 16 BRPM | OXYGEN SATURATION: 98 % | HEIGHT: 65 IN | BODY MASS INDEX: 34.16 KG/M2 | DIASTOLIC BLOOD PRESSURE: 84 MMHG | TEMPERATURE: 98.8 F | WEIGHT: 205 LBS | SYSTOLIC BLOOD PRESSURE: 116 MMHG

## 2022-07-18 PROCEDURE — 99205 OFFICE O/P NEW HI 60 MIN: CPT

## 2022-07-19 NOTE — HISTORY OF PRESENT ILLNESS
[de-identified] : Very pleasant patient BMI of 34 high waist circumference PCOS hypertension hyperlipidemia, prediabetes psoriatic arthritis, IBS, intermittent asthma comes for evaluation of elevated LFTs primarily transaminases.  She has had some baseline elevations in the past few months and there is an acute uptake in transaminases and alkaline phosphatase with the latest ALT 35, , alkaline phosphatase 137 and bilirubin normal at 0.4 with CT revealing normal bile ducts and hepatic steatosis.\par Patient has a significant history of autoimmunity in herself in the form of psoriatic arthritis in her family as well with her sister having lupus.\par Patient's mother had cholangiocarcinoma.\par Father had MI at a young age in his 50s.\par Some family history of late first trimester and second trimester miscarriages in the family.\par Denies history of excessive alcohol intake.\par Her hypertension is controlled by 3 medications.\par PCOS was diagnosed at age 17 which presented with cystic acne hirsutism and irregular menses as managed by OCP.  During evaluation DHEA-S level of 620 with adrenal CT revealing an 8 mm cyst.\par \par Humira started last fall.\par Patient also was intermittently taking a diet drink of sorts which I have asked her to refrain from taking\par \par \par \par \par

## 2022-07-19 NOTE — ASSESSMENT
[FreeTextEntry1] : Very pleasant 32-year-old with metabolic syndrome, PCOS, psoriatic arthritiS, presents for acute elevation of transaminases with a slight elevation of alkaline phosphatase normal bilirubin imaging showing steatosis.\par Next labs ordered to rule out various etiologies of liver disease.\par Absent those labs drug-induced liver injury at the from supplement or prescription medications is on the differential.\par Follow-up after labs

## 2022-07-19 NOTE — PHYSICAL EXAM
[General Appearance - Alert] : alert [General Appearance - In No Acute Distress] : in no acute distress [Sclera] : the sclera and conjunctiva were normal [PERRL With Normal Accommodation] : pupils were equal in size, round, and reactive to light [Extraocular Movements] : extraocular movements were intact [Bowel Sounds] : normal bowel sounds [Abdomen Soft] : soft [Abdomen Tenderness] : non-tender [] : no hepato-splenomegaly [Abdomen Mass (___ Cm)] : no abdominal mass palpated [Oriented To Time, Place, And Person] : oriented to person, place, and time [Impaired Insight] : insight and judgment were intact [Affect] : the affect was normal [Scleral Icterus] : No Scleral Icterus [Hepatojugular Reflux] : patient did not have a sustained hepatojugular reflux [Spider Angioma] : No spider angioma(s) were observed [Abdominal Bruit] : no abdominal bruit [Abdominal  Ascites] : no ascites [Ascites Fluid Wave] : no ascites fluid wave [Ascites Tense] : ascites is not tense [Ascites Shifting Dullness] : no shifting dullness of ascites [Asterixis] : no asterixis observed [Jaundice] : No jaundice [FreeTextEntry1] : No pedal edema.

## 2022-07-22 ENCOUNTER — NON-APPOINTMENT (OUTPATIENT)
Age: 32
End: 2022-07-22

## 2022-07-22 ENCOUNTER — TRANSCRIPTION ENCOUNTER (OUTPATIENT)
Age: 32
End: 2022-07-22

## 2022-07-22 LAB
A1AT SERPL-MCNC: 144 MG/DL
ALBUMIN SERPL ELPH-MCNC: 4.4 G/DL
ALP BLD-CCNC: 168 U/L
ALT SERPL-CCNC: 337 U/L
ANA PAT FLD IF-IMP: ABNORMAL
ANA SER IF-ACNC: ABNORMAL
AST SERPL-CCNC: 225 U/L
BILIRUB DIRECT SERPL-MCNC: 0.1 MG/DL
BILIRUB INDIRECT SERPL-MCNC: 0.2 MG/DL
BILIRUB SERPL-MCNC: 0.3 MG/DL
CERULOPLASMIN SERPL-MCNC: 31 MG/DL
CMV DNA SPEC QL NAA+PROBE: NOT DETECTED IU/ML
CMVPCR LOG: NOT DETECTED LOG10IU/ML
EBV DNA SERPL NAA+PROBE-ACNC: NOT DETECTED IU/ML
EBVPCR LOG: NOT DETECTED LOG10IU/ML
FERRITIN SERPL-MCNC: 82 NG/ML
HEV AB SER QL: NEGATIVE
INR PPP: 1.03 RATIO
IRON SATN MFR SERPL: 13 %
IRON SERPL-MCNC: 64 UG/DL
NT-PROBNP SERPL-MCNC: <5 PG/ML
PROT SERPL-MCNC: 7.6 G/DL
PT BLD: 12 SEC
TIBC SERPL-MCNC: 488 UG/DL
UIBC SERPL-MCNC: 423 UG/DL

## 2022-07-29 ENCOUNTER — RX RENEWAL (OUTPATIENT)
Age: 32
End: 2022-07-29

## 2022-07-29 LAB — SMOOTH MUSCLE AB SER QL IF: NORMAL

## 2022-08-01 ENCOUNTER — APPOINTMENT (OUTPATIENT)
Dept: FAMILY MEDICINE | Facility: CLINIC | Age: 32
End: 2022-08-01

## 2022-08-01 VITALS
WEIGHT: 206 LBS | HEIGHT: 65 IN | SYSTOLIC BLOOD PRESSURE: 114 MMHG | RESPIRATION RATE: 16 BRPM | DIASTOLIC BLOOD PRESSURE: 87 MMHG | BODY MASS INDEX: 34.32 KG/M2 | HEART RATE: 87 BPM

## 2022-08-01 DIAGNOSIS — Z02.89 ENCOUNTER FOR OTHER ADMINISTRATIVE EXAMINATIONS: ICD-10-CM

## 2022-08-01 DIAGNOSIS — R74.01 ELEVATION OF LEVELS OF LIVER TRANSAMINASE LEVELS: ICD-10-CM

## 2022-08-01 DIAGNOSIS — E78.5 HYPERLIPIDEMIA, UNSPECIFIED: ICD-10-CM

## 2022-08-01 PROCEDURE — 99215 OFFICE O/P EST HI 40 MIN: CPT

## 2022-08-01 NOTE — PLAN
[FreeTextEntry1] : \par School physical\par Had Tdap on 2019 per records faxed from hospitals\par Had PPD recently per forms she brings today at the office\par Had fit testing\par Signing off he form\par \par HTN \par Continue Amlodipine 5 mg QD\par Continue metoprolol  mg QD\par On spironolactone 100 mg BID\par WIll see cardio next week\par \par Transaminitis\par Has hepatic steatosis on abd US from dec/2021\par Is now off fenofibrate micronized\par Sees hepatology Dr Cantu, pending biopsy. She  will ask her if can take it again\par \par HLD\par Off fenofibrate and statins due to transaminitis, she will ask her hepatologist\par \par This was an extensive visit that included review of previous labs and specialists notes, HIE records before and after the face to face encounter \par \par Return to care: within 3 months for follow up or earlier if needed\par Call or return for any questions

## 2022-08-01 NOTE — HISTORY OF PRESENT ILLNESS
[de-identified] : Ms. COURTNEY DEY is a pleasant 31 year old female with PMH of asthma, last crisis 2 years ago, never intubated, PCOS on spironolactone, anxiety and depression on Cimbalta, Xanax QHS, and FM or RA? ankylosing spondylitis following up with rheum, herniated lumbar and cervical disks following up with PMR, pre-DM, HLD, transaminitis, HTN. Patient is here for a form for school. They require a TB test, PPD, resp fit testing.\par No complaints today\par \par Sees hepatology for transaminitis\par Sees endocrinology \par \par Endocrinology: Brianna\par Psych: Chimma\par Rheum: Rosemary Bhatti [phone]\par Derm: Mathew Fuentes\par Gyn: Annabel Sahu\par Allergist: DA Guallpa in [facility]

## 2022-08-02 ENCOUNTER — NON-APPOINTMENT (OUTPATIENT)
Age: 32
End: 2022-08-02

## 2022-08-03 LAB — HEPATITIS E IGM ABY: DETECTED

## 2022-08-10 ENCOUNTER — APPOINTMENT (OUTPATIENT)
Dept: GASTROENTEROLOGY | Facility: CLINIC | Age: 32
End: 2022-08-10

## 2022-08-11 ENCOUNTER — TRANSCRIPTION ENCOUNTER (OUTPATIENT)
Age: 32
End: 2022-08-11

## 2022-08-11 LAB
ALBUMIN SERPL ELPH-MCNC: 4.2 G/DL
ALBUMIN SERPL ELPH-MCNC: 4.3 G/DL
ALP BLD-CCNC: 153 U/L
ALP BLD-CCNC: 155 U/L
ALT SERPL-CCNC: 177 U/L
ALT SERPL-CCNC: 178 U/L
AST SERPL-CCNC: 81 U/L
AST SERPL-CCNC: 81 U/L
BILIRUB DIRECT SERPL-MCNC: 0.1 MG/DL
BILIRUB DIRECT SERPL-MCNC: 0.2 MG/DL
BILIRUB INDIRECT SERPL-MCNC: 0.2 MG/DL
BILIRUB INDIRECT SERPL-MCNC: 0.2 MG/DL
BILIRUB SERPL-MCNC: 0.3 MG/DL
BILIRUB SERPL-MCNC: 0.4 MG/DL
INR PPP: 1 RATIO
PROT SERPL-MCNC: 7.4 G/DL
PROT SERPL-MCNC: 7.4 G/DL
PT BLD: 11.7 SEC

## 2022-08-12 ENCOUNTER — LABORATORY RESULT (OUTPATIENT)
Age: 32
End: 2022-08-12

## 2022-08-15 ENCOUNTER — TRANSCRIPTION ENCOUNTER (OUTPATIENT)
Age: 32
End: 2022-08-15

## 2022-08-15 ENCOUNTER — RESULT REVIEW (OUTPATIENT)
Age: 32
End: 2022-08-15

## 2022-08-15 ENCOUNTER — OUTPATIENT (OUTPATIENT)
Dept: OUTPATIENT SERVICES | Facility: HOSPITAL | Age: 32
LOS: 1 days | End: 2022-08-15
Payer: COMMERCIAL

## 2022-08-15 VITALS
SYSTOLIC BLOOD PRESSURE: 129 MMHG | WEIGHT: 205.03 LBS | RESPIRATION RATE: 18 BRPM | OXYGEN SATURATION: 97 % | DIASTOLIC BLOOD PRESSURE: 84 MMHG | HEIGHT: 65 IN | HEART RATE: 78 BPM

## 2022-08-15 DIAGNOSIS — R74.8 ABNORMAL LEVELS OF OTHER SERUM ENZYMES: ICD-10-CM

## 2022-08-15 LAB
ALBUMIN SERPL ELPH-MCNC: 4.3 G/DL
ALP BLD-CCNC: 158 U/L
ALT SERPL-CCNC: 143 U/L
ANION GAP SERPL CALC-SCNC: 18 MMOL/L
APTT BLD: 26.8 SEC
AST SERPL-CCNC: 66 U/L
BASOPHILS # BLD AUTO: 0.19 K/UL
BASOPHILS NFR BLD AUTO: 0.9 %
BILIRUB SERPL-MCNC: 0.3 MG/DL
BUN SERPL-MCNC: 16 MG/DL
CALCIUM SERPL-MCNC: 10.4 MG/DL
CHLORIDE SERPL-SCNC: 100 MMOL/L
CO2 SERPL-SCNC: 25 MMOL/L
CREAT SERPL-MCNC: 0.76 MG/DL
EGFR: 108 ML/MIN/1.73M2
EOSINOPHIL # BLD AUTO: 0.37 K/UL
EOSINOPHIL NFR BLD AUTO: 1.8 %
GLUCOSE SERPL-MCNC: 136 MG/DL
HCG UR QL: NEGATIVE — SIGNIFICANT CHANGE UP
HCT VFR BLD CALC: 43.2 %
HGB BLD-MCNC: 13.8 G/DL
INR PPP: 0.99 RATIO
LYMPHOCYTES # BLD AUTO: 4.01 K/UL
LYMPHOCYTES NFR BLD AUTO: 19.4 %
MAN DIFF?: NORMAL
MCHC RBC-ENTMCNC: 28 PG
MCHC RBC-ENTMCNC: 31.9 GM/DL
MCV RBC AUTO: 87.8 FL
MONOCYTES # BLD AUTO: 0.91 K/UL
MONOCYTES NFR BLD AUTO: 4.4 %
NEUTROPHILS # BLD AUTO: 14.65 K/UL
NEUTROPHILS NFR BLD AUTO: 70.8 %
PLATELET # BLD AUTO: 560 K/UL
POTASSIUM SERPL-SCNC: 4.8 MMOL/L
PROT SERPL-MCNC: 7.8 G/DL
PT BLD: 11.7 SEC
RBC # BLD: 4.92 M/UL
RBC # FLD: 14.2 %
SARS-COV-2 N GENE NPH QL NAA+PROBE: DETECTED
SODIUM SERPL-SCNC: 143 MMOL/L
WBC # FLD AUTO: 20.69 K/UL

## 2022-08-15 PROCEDURE — 76942 ECHO GUIDE FOR BIOPSY: CPT | Mod: 26

## 2022-08-15 PROCEDURE — 88307 TISSUE EXAM BY PATHOLOGIST: CPT | Mod: 26

## 2022-08-15 PROCEDURE — 81025 URINE PREGNANCY TEST: CPT

## 2022-08-15 PROCEDURE — 76942 ECHO GUIDE FOR BIOPSY: CPT

## 2022-08-15 PROCEDURE — 88307 TISSUE EXAM BY PATHOLOGIST: CPT

## 2022-08-15 PROCEDURE — 47000 NEEDLE BIOPSY OF LIVER PERQ: CPT

## 2022-08-15 PROCEDURE — 88313 SPECIAL STAINS GROUP 2: CPT | Mod: 26

## 2022-08-15 PROCEDURE — 88313 SPECIAL STAINS GROUP 2: CPT

## 2022-08-15 RX ORDER — FUROSEMIDE 40 MG
1 TABLET ORAL
Qty: 0 | Refills: 0 | DISCHARGE

## 2022-08-15 RX ORDER — CHOLECALCIFEROL (VITAMIN D3) 125 MCG
1 CAPSULE ORAL
Qty: 0 | Refills: 0 | DISCHARGE

## 2022-08-15 RX ORDER — ACETAMINOPHEN 500 MG
650 TABLET ORAL ONCE
Refills: 0 | Status: DISCONTINUED | OUTPATIENT
Start: 2022-08-15 | End: 2022-08-29

## 2022-08-15 RX ORDER — FENOFIBRATE,MICRONIZED 130 MG
1 CAPSULE ORAL
Qty: 0 | Refills: 0 | DISCHARGE

## 2022-08-15 RX ORDER — CETIRIZINE HYDROCHLORIDE 10 MG/1
1 TABLET ORAL
Qty: 0 | Refills: 0 | DISCHARGE

## 2022-08-15 RX ORDER — METOPROLOL TARTRATE 50 MG
1 TABLET ORAL
Qty: 0 | Refills: 0 | DISCHARGE

## 2022-08-15 RX ORDER — MONTELUKAST 4 MG/1
1 TABLET, CHEWABLE ORAL
Qty: 0 | Refills: 0 | DISCHARGE

## 2022-08-15 RX ORDER — OMEPRAZOLE 10 MG/1
1 CAPSULE, DELAYED RELEASE ORAL
Qty: 0 | Refills: 0 | DISCHARGE

## 2022-08-15 RX ORDER — ADALIMUMAB 40MG/0.8ML
0 KIT SUBCUTANEOUS
Qty: 0 | Refills: 0 | DISCHARGE

## 2022-08-15 NOTE — BRIEF OPERATIVE NOTE - OPERATION/FINDINGS
17 g coaxial needle right lobe of liver. 18 g biopince core bx x 4. Biosentry plug deployed.  Moderate sedation: 50 mcg fentanyl IV and 1 mg versed IV

## 2022-08-15 NOTE — ASU DISCHARGE PLAN (ADULT/PEDIATRIC) - NS MD DC FALL RISK RISK
For information on Fall & Injury Prevention, visit: [email or web address] OR  [email or web address] OR  https://www.cdc.gov/steadi/patient.html

## 2022-08-15 NOTE — ASU PREOP CHECKLIST - INTERNAL PROSTHESES
General: well appearing female, no acute distress   HEENT: normocephalic, atraumatic   Respiratory: normal work of breathing  Abdomen: soft, non-tender, no guarding or rebound   MSK: no swelling or tenderness of lower extremities, moving all extremities spontaneously   Skin: warm, dry   Neuro: A&Ox3  Psych: appropriate affect no

## 2022-08-16 ENCOUNTER — TRANSCRIPTION ENCOUNTER (OUTPATIENT)
Age: 32
End: 2022-08-16

## 2022-08-16 ENCOUNTER — NON-APPOINTMENT (OUTPATIENT)
Age: 32
End: 2022-08-16

## 2022-08-16 LAB
ANNOTATION COMMENT IMP: NOT DETECTED
HEPATITIS E QUANT BY PCR, IU/ML: NORMAL IU/ML
HEPATITIS E QUANT BY PCR, LOG IU/ML: <3.3 LOG IU/ML
HEPATITIS E QUANT BY PCR, SOURCE: NORMAL

## 2022-08-19 LAB — SURGICAL PATHOLOGY STUDY: SIGNIFICANT CHANGE UP

## 2022-08-29 RX ORDER — SEMAGLUTIDE 0.25 MG/.5ML
0.25 INJECTION, SOLUTION SUBCUTANEOUS
Qty: 3 | Refills: 1 | Status: DISCONTINUED | COMMUNITY
Start: 2022-07-05 | End: 2022-08-29

## 2022-08-30 ENCOUNTER — RX RENEWAL (OUTPATIENT)
Age: 32
End: 2022-08-30

## 2022-08-30 ENCOUNTER — APPOINTMENT (OUTPATIENT)
Dept: HEPATOLOGY | Facility: CLINIC | Age: 32
End: 2022-08-30

## 2022-09-07 ENCOUNTER — TRANSCRIPTION ENCOUNTER (OUTPATIENT)
Age: 32
End: 2022-09-07

## 2022-09-08 ENCOUNTER — TRANSCRIPTION ENCOUNTER (OUTPATIENT)
Age: 32
End: 2022-09-08

## 2022-09-08 RX ORDER — SEMAGLUTIDE 1.34 MG/ML
2 INJECTION, SOLUTION SUBCUTANEOUS
Qty: 3 | Refills: 1 | Status: DISCONTINUED | COMMUNITY
Start: 2022-08-26 | End: 2022-09-08

## 2022-09-08 NOTE — ED PROVIDER NOTE - DISCHARGE DATE
Department of Anesthesiology  Postprocedure Note    Patient: Haresh Kan  MRN: [id number]  YOB: 1957  Date of evaluation: 9/8/2022      Procedure Summary     Date: 09/08/22 Room / Location: [address] [place] [address]    Anesthesia Start: 0940 Anesthesia Stop: 5293    Procedures:       ESOPHAGOGASTRODUODENOSCOPY WITH ESOPHAGEAL DILATION      EGD BIOPSY Diagnosis:       Dysphagia, unspecified type      (DYSPHAGIA)    Surgeons: Christina Aguero DO Responsible Provider: Gerard Valentino MD    Anesthesia Type: MAC ASA Status: 4          Anesthesia Type: No value filed.     Prince Phase I: Prince Score: 9    Prince Phase II:        Anesthesia Post Evaluation    Patient location during evaluation: PACU  Patient participation: complete - patient participated  Level of consciousness: awake  Airway patency: patent  Nausea & Vomiting: no nausea and no vomiting  Cardiovascular status: blood pressure returned to baseline  Respiratory status: acceptable  Hydration status: stable  Multimodal analgesia pain management approach
14-Dec-2021

## 2022-09-13 ENCOUNTER — APPOINTMENT (OUTPATIENT)
Dept: ENDOCRINOLOGY | Facility: CLINIC | Age: 32
End: 2022-09-13

## 2022-09-26 ENCOUNTER — APPOINTMENT (OUTPATIENT)
Dept: GASTROENTEROLOGY | Facility: CLINIC | Age: 32
End: 2022-09-26

## 2022-09-26 VITALS
RESPIRATION RATE: 16 BRPM | WEIGHT: 206 LBS | HEIGHT: 65 IN | OXYGEN SATURATION: 98 % | SYSTOLIC BLOOD PRESSURE: 120 MMHG | DIASTOLIC BLOOD PRESSURE: 80 MMHG | HEART RATE: 70 BPM | BODY MASS INDEX: 34.32 KG/M2

## 2022-09-26 DIAGNOSIS — K76.0 FATTY (CHANGE OF) LIVER, NOT ELSEWHERE CLASSIFIED: ICD-10-CM

## 2022-09-26 DIAGNOSIS — Z09 ENCOUNTER FOR FOLLOW-UP EXAMINATION AFTER COMPLETED TREATMENT FOR CONDITIONS OTHER THAN MALIGNANT NEOPLASM: ICD-10-CM

## 2022-09-26 PROCEDURE — 99213 OFFICE O/P EST LOW 20 MIN: CPT

## 2022-09-26 NOTE — HISTORY OF PRESENT ILLNESS
[de-identified] : COURTNEY DEY is a 32 year old female with a PMH significant for HTN, HLD, preDM, psoriatic arthritis, asthma, and PCOS. She presents today for follow up visit.\par \par Pt reports she is feeling well. She has started Mounjaro injections in the last 2 weeks and states she has lost 7 pounds. US abdomen showed fatty infiltration of the liver. Labs and liver biopsy reviewed with pt. Hepatitis A, B, and C negative. Positive Hepatitis E antibody but viral load negative indicating resolved infection. CMV, EBV, alpha-1 antitrypsin deficiency, and ceruloplasmin negative. Isolated LLOYD positivity. Mild increase in iron binding capacity but the transferrin saturation is very low. Liver biopsy 8/15/22 showed Fatty infiltration. Macrovesicular fatty change 40%

## 2022-09-26 NOTE — PHYSICAL EXAM
[Scleral Icterus] : No Scleral Icterus [Hepatojugular Reflux] : patient did not have a sustained hepatojugular reflux [Spider Angioma] : No spider angioma(s) were observed [Abdominal  Ascites] : no ascites [General Appearance - Alert] : alert [General Appearance - In No Acute Distress] : in no acute distress [Sclera] : the sclera and conjunctiva were normal [Outer Ear] : the ears and nose were normal in appearance [Oropharynx] : the oropharynx was normal [Neck Appearance] : the appearance of the neck was normal [Auscultation Breath Sounds / Voice Sounds] : lungs were clear to auscultation bilaterally [Bowel Sounds] : normal bowel sounds [Abdomen Soft] : soft [Abdomen Tenderness] : non-tender [Abdomen Mass (___ Cm)] : no abdominal mass palpated [Abnormal Walk] : normal gait [Nail Clubbing] : no clubbing  or cyanosis of the fingernails [Musculoskeletal - Swelling] : no joint swelling seen [Motor Tone] : muscle strength and tone were normal [Skin Color & Pigmentation] : normal skin color and pigmentation [Skin Turgor] : normal skin turgor [] : no rash [No Focal Deficits] : no focal deficits [Oriented To Time, Place, And Person] : oriented to person, place, and time [Impaired Insight] : insight and judgment were intact [Affect] : the affect was normal

## 2022-09-26 NOTE — ASSESSMENT
[FreeTextEntry1] : COURTNEY DEY is a 32 year old female with a PMH significant for HTN, HLD, preDM, psoriatic arthritis, asthma, and PCOS. \par \par Most likely Nonalcoholic Fatty Liver Disease.\par Liver biopsy 8/15/22 showed Fatty infiltration. Macrovesicular fatty change 40%.\par Etiology of fatty liver disease explained to pt in detail along with complications of disease progression.\par \par Recommend lifestyle modifications in form of diet and exercise.\par These changes have been shown to lead to regression or even resolution of steatosis, inflammation, and even fibrosis in some patients.\par High protein, low fat, low sugar, low salt (up to 2G/day) diet\par Gradual weight loss of 7-10% of current body weight.\par Increase physical activity.\par Copy of Mediterranean diet given.\par \par Follow up in 1 year with labs or sooner if needed.\par

## 2022-09-26 NOTE — ADDENDUM
[FreeTextEntry1] : I, Anahi Munson NP, acted as scribe for DOROTEO Crews for this patient encounter.

## 2022-09-27 NOTE — HISTORY OF PRESENT ILLNESS
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 \par \par \par [facility] pt\par 9/26/22 last ov\par Plan:\par \par Most likely Nonalcoholic Fatty Liver Disease.\par Liver biopsy 8/15/22 showed Fatty infiltration. Macrovesicular fatty change 40%.\par Etiology of fatty liver disease explained to pt in detail along with complications of disease progression.\par \par Recommend lifestyle modifications in form of diet and exercise.\par These changes have been shown to lead to regression or even resolution of steatosis, inflammation, and even fibrosis in some patients.\par High protein, low fat, low sugar, low salt (up to 2G/day) diet\par Gradual weight loss of 7-10% of current body weight.\par Increase physical activity.\par Copy of Mediterranean diet given.\par \par Follow up in 1 year with labs or sooner if needed.\par \par 9/21 Ron patient:\par Reported negative biopsies from EGD and colonoscopy\par No evidence of IBD\par \par OK to start sulfasalazine from rheum\par Can take Imodium or Pepto Bismol prn\par f/u in a month. \par \par Path:\par           Final Diagnosis\par           1. Duodenum, second part, biopsy\par - Duodenal mucosa with no significant diagnostic\par           alterations.\par \par           2. Stomach, antrum, biopsy\par - Gastric antral mucosa with chronic inactive gastritis.\par - No morphologic evidence of Helicobacter microorganisms.\par - Negative for intestinal metaplasia.\par \par           3. Small bowel, terminal ileum, biopsy\par - Small bowel mucosa with no significant diagnostic\par           alterations.\par \par           4. Mid ascending colon, biopsy\par - Colonic mucosa with no significant diagnostic alterations.\par \par           5. Sigmoid colon, biopsy\par - Colonic mucosa with no significant diagnostic alterations.\par \par

## 2022-09-30 ENCOUNTER — APPOINTMENT (OUTPATIENT)
Dept: GASTROENTEROLOGY | Facility: CLINIC | Age: 32
End: 2022-09-30

## 2022-10-05 ENCOUNTER — TRANSCRIPTION ENCOUNTER (OUTPATIENT)
Age: 32
End: 2022-10-05

## 2022-10-11 ENCOUNTER — APPOINTMENT (OUTPATIENT)
Dept: HEPATOLOGY | Facility: CLINIC | Age: 32
End: 2022-10-11

## 2022-11-08 ENCOUNTER — APPOINTMENT (OUTPATIENT)
Dept: ENDOCRINOLOGY | Facility: CLINIC | Age: 32
End: 2022-11-08

## 2022-12-15 ENCOUNTER — TRANSCRIPTION ENCOUNTER (OUTPATIENT)
Age: 32
End: 2022-12-15

## 2022-12-20 ENCOUNTER — TRANSCRIPTION ENCOUNTER (OUTPATIENT)
Age: 32
End: 2022-12-20

## 2022-12-20 RX ORDER — TIRZEPATIDE 5 MG/.5ML
5 INJECTION, SOLUTION SUBCUTANEOUS
Qty: 3 | Refills: 0 | Status: DISCONTINUED | COMMUNITY
Start: 2022-07-12 | End: 2022-12-20

## 2022-12-22 ENCOUNTER — TRANSCRIPTION ENCOUNTER (OUTPATIENT)
Age: 32
End: 2022-12-22

## 2022-12-29 NOTE — ED PROVIDER NOTE - TOBACCO USE
Post-Care Instructions: I reviewed with the patient in detail post-care instructions. Patient should stay away from the sun and wear sun protection until treated areas are fully healed. Never smoker

## 2023-01-04 ENCOUNTER — APPOINTMENT (OUTPATIENT)
Dept: ENDOCRINOLOGY | Facility: CLINIC | Age: 33
End: 2023-01-04

## 2023-01-04 RX ORDER — ORAL SEMAGLUTIDE 3 MG/1
3 TABLET ORAL DAILY
Qty: 30 | Refills: 0 | Status: DISCONTINUED | COMMUNITY
Start: 2022-12-20 | End: 2023-01-04

## 2023-01-20 ENCOUNTER — NON-APPOINTMENT (OUTPATIENT)
Age: 33
End: 2023-01-20

## 2023-01-23 ENCOUNTER — TRANSCRIPTION ENCOUNTER (OUTPATIENT)
Age: 33
End: 2023-01-23

## 2023-01-24 ENCOUNTER — APPOINTMENT (OUTPATIENT)
Dept: FAMILY MEDICINE | Facility: CLINIC | Age: 33
End: 2023-01-24
Payer: COMMERCIAL

## 2023-01-24 ENCOUNTER — APPOINTMENT (OUTPATIENT)
Dept: UROLOGY | Facility: CLINIC | Age: 33
End: 2023-01-24
Payer: COMMERCIAL

## 2023-01-24 ENCOUNTER — NON-APPOINTMENT (OUTPATIENT)
Age: 33
End: 2023-01-24

## 2023-01-24 VITALS
HEIGHT: 65 IN | RESPIRATION RATE: 16 BRPM | BODY MASS INDEX: 34.66 KG/M2 | HEART RATE: 87 BPM | SYSTOLIC BLOOD PRESSURE: 112 MMHG | WEIGHT: 208 LBS | DIASTOLIC BLOOD PRESSURE: 85 MMHG

## 2023-01-24 VITALS
HEIGHT: 65 IN | DIASTOLIC BLOOD PRESSURE: 80 MMHG | SYSTOLIC BLOOD PRESSURE: 118 MMHG | WEIGHT: 208 LBS | BODY MASS INDEX: 34.66 KG/M2 | HEART RATE: 101 BPM

## 2023-01-24 DIAGNOSIS — Z78.9 OTHER SPECIFIED HEALTH STATUS: ICD-10-CM

## 2023-01-24 DIAGNOSIS — R39.12 POOR URINARY STREAM: ICD-10-CM

## 2023-01-24 DIAGNOSIS — M79.661 PAIN IN RIGHT LOWER LEG: ICD-10-CM

## 2023-01-24 LAB
BILIRUB UR QL STRIP: NORMAL
CLARITY UR: CLEAR
COLLECTION METHOD: NORMAL
GLUCOSE UR-MCNC: NORMAL
HCG UR QL: 0.2 EU/DL
HGB UR QL STRIP.AUTO: NORMAL
KETONES UR-MCNC: NORMAL
LEUKOCYTE ESTERASE UR QL STRIP: NORMAL
NITRITE UR QL STRIP: NORMAL
PH UR STRIP: 7.5
PROT UR STRIP-MCNC: NORMAL
SP GR UR STRIP: 1.02

## 2023-01-24 PROCEDURE — 99213 OFFICE O/P EST LOW 20 MIN: CPT

## 2023-01-24 PROCEDURE — 99214 OFFICE O/P EST MOD 30 MIN: CPT

## 2023-01-24 PROCEDURE — 81003 URINALYSIS AUTO W/O SCOPE: CPT | Mod: QW

## 2023-01-24 RX ORDER — ADALIMUMAB 40MG/0.8ML
40 KIT SUBCUTANEOUS
Refills: 5 | Status: DISCONTINUED | COMMUNITY
Start: 2022-04-04 | End: 2023-01-24

## 2023-01-24 RX ORDER — LIRAGLUTIDE 6 MG/ML
18 INJECTION, SOLUTION SUBCUTANEOUS
Qty: 5 | Refills: 0 | Status: DISCONTINUED | COMMUNITY
Start: 2023-01-04 | End: 2023-01-24

## 2023-01-24 RX ORDER — LEVALBUTEROL TARTRATE 45 UG/1
45 AEROSOL, METERED ORAL
Qty: 3 | Refills: 3 | Status: DISCONTINUED | COMMUNITY
Start: 2021-08-27 | End: 2023-01-24

## 2023-01-24 RX ORDER — DULOXETINE HYDROCHLORIDE 60 MG/1
60 CAPSULE, DELAYED RELEASE ORAL
Refills: 0 | Status: DISCONTINUED | COMMUNITY
End: 2023-01-24

## 2023-01-24 RX ORDER — CETIRIZINE HCL 10 MG
10 TABLET ORAL
Refills: 0 | Status: DISCONTINUED | COMMUNITY
End: 2023-01-24

## 2023-01-24 NOTE — PLAN
[FreeTextEntry1] : \par Right calf pain\par Likely muscular, low suspicious for a DVT but was on a plane recently\par Symptoms improving\par Ordering a duplex US ASAP\par \par Return to care: as needed\par Call or return for any questions

## 2023-01-24 NOTE — ASSESSMENT
[FreeTextEntry1] : Impression\par \par weak stream\par \par Plan" cysto\par consider UDS or interstim.

## 2023-01-24 NOTE — LETTER BODY
No [Dear  ___] : Dear  [unfilled], [Courtesy Letter:] : I had the pleasure of seeing your patient, [unfilled], in my office today. [Please see my note below.] : Please see my note below. [Sincerely,] : Sincerely, [FreeTextEntry3] : Ed\par \par Jose Dial MD\par [facility]\par  of Urology\par [employer] [facility]\par

## 2023-01-24 NOTE — HISTORY OF PRESENT ILLNESS
[FreeTextEntry8] : Ms. COURTNEY DEY is a pleasant 32 year old female with PMH of asthma, last crisis 2 years ago, never intubated, PCOS on spironolactone, anxiety and depression now off Cymbalta, Xanax QHS, and FM or RA? ankylosing spondylitis following up with rheum, herniated lumbar and cervical disks following up with PMR, pre-DM, HLD, transaminitis, HTN. Patient is here for pain in her left calf and swelling on 01/21/2023. the swelling is better now. The pain is only with movement. \par She denies warmness, redness, chest pain, shortness of breath. She came back from vacation, took a plane on on 01/20/2023. Denies personal or family of clotting disorders. Her mother had a clot but she had cancer.\par \par No other complaints today\par \par Sees hepatology for transaminitis\par Sees endocrinology \par \par Endocrinology: Brianna\par Psych: Chimma\par Rheum: Rosemary Bhatti [phone]\par Derm: Mathew Fuentes\par Gyn: Annabel Sahu\par Allergist: DA Guallpa in [facility]

## 2023-01-24 NOTE — PHYSICAL EXAM
[General Appearance - Well Developed] : well developed [General Appearance - Well Nourished] : well nourished [Normal Appearance] : normal appearance [Well Groomed] : well groomed [General Appearance - In No Acute Distress] : no acute distress [Edema] : no peripheral edema [Respiration, Rhythm And Depth] : normal respiratory rhythm and effort [Exaggerated Use Of Accessory Muscles For Inspiration] : no accessory muscle use [Abdomen Soft] : soft [Abdomen Tenderness] : non-tender [Costovertebral Angle Tenderness] : no ~M costovertebral angle tenderness [Normal Station and Gait] : the gait and station were normal for the patient's age [] : no rash [No Focal Deficits] : no focal deficits [Affect] : the affect was normal [Oriented To Time, Place, And Person] : oriented to person, place, and time [Mood] : the mood was normal [Not Anxious] : not anxious

## 2023-01-24 NOTE — HISTORY OF PRESENT ILLNESS
[FreeTextEntry1] : The patient presents with progressively worsening urinary issues. 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  Recently she noted her urine stream was weaker with need to double void to empty. she has noted that the second void just dribbles out.  No hematuria.  Previously had surgery on her urethra.  It was as a child and her exact procedure not known.

## 2023-01-24 NOTE — PHYSICAL EXAM
[Normal] : normal rate, regular rhythm, normal S1 and S2 and no murmur heard [No Carotid Bruits] : no carotid bruits [Pedal Pulses Present] : the pedal pulses are present [de-identified] : Negative calf tenderness, equivocal Jojo's sign (left). No leg swelling, redness, warmness.

## 2023-01-25 ENCOUNTER — TRANSCRIPTION ENCOUNTER (OUTPATIENT)
Age: 33
End: 2023-01-25

## 2023-01-30 ENCOUNTER — APPOINTMENT (OUTPATIENT)
Dept: CARDIOLOGY | Facility: CLINIC | Age: 33
End: 2023-01-30

## 2023-01-31 ENCOUNTER — APPOINTMENT (OUTPATIENT)
Dept: ULTRASOUND IMAGING | Facility: CLINIC | Age: 33
End: 2023-01-31
Payer: COMMERCIAL

## 2023-01-31 ENCOUNTER — OUTPATIENT (OUTPATIENT)
Dept: OUTPATIENT SERVICES | Facility: HOSPITAL | Age: 33
LOS: 1 days | End: 2023-01-31
Payer: COMMERCIAL

## 2023-01-31 DIAGNOSIS — Z00.8 ENCOUNTER FOR OTHER GENERAL EXAMINATION: ICD-10-CM

## 2023-01-31 PROCEDURE — 93970 EXTREMITY STUDY: CPT

## 2023-01-31 PROCEDURE — 93970 EXTREMITY STUDY: CPT | Mod: 26

## 2023-02-01 ENCOUNTER — APPOINTMENT (OUTPATIENT)
Dept: FAMILY MEDICINE | Facility: CLINIC | Age: 33
End: 2023-02-01
Payer: COMMERCIAL

## 2023-02-01 VITALS
HEIGHT: 65 IN | DIASTOLIC BLOOD PRESSURE: 88 MMHG | HEART RATE: 85 BPM | RESPIRATION RATE: 16 BRPM | SYSTOLIC BLOOD PRESSURE: 122 MMHG | WEIGHT: 208 LBS | BODY MASS INDEX: 34.66 KG/M2

## 2023-02-01 DIAGNOSIS — Z01.818 ENCOUNTER FOR OTHER PREPROCEDURAL EXAMINATION: ICD-10-CM

## 2023-02-01 DIAGNOSIS — Z71.2 PERSON CONSULTING FOR EXPLANATION OF EXAMINATION OR TEST FINDINGS: ICD-10-CM

## 2023-02-01 PROCEDURE — 99214 OFFICE O/P EST MOD 30 MIN: CPT

## 2023-02-01 RX ORDER — CYCLOBENZAPRINE HYDROCHLORIDE 5 MG/1
5 TABLET, FILM COATED ORAL
Qty: 28 | Refills: 0 | Status: DISCONTINUED | COMMUNITY
Start: 2022-04-18 | End: 2023-02-01

## 2023-02-01 RX ORDER — AZELASTINE HYDROCHLORIDE 137 UG/1
137 SPRAY, METERED NASAL DAILY
Qty: 3 | Refills: 1 | Status: DISCONTINUED | COMMUNITY
Start: 2019-10-04 | End: 2023-02-01

## 2023-02-02 ENCOUNTER — APPOINTMENT (OUTPATIENT)
Dept: UROLOGY | Facility: CLINIC | Age: 33
End: 2023-02-02

## 2023-02-06 NOTE — ADDENDUM
[FreeTextEntry1] : 4:31 PM 02/06/2023 EKG and labs from [facility] from 02/02/2023 Received today and reviewed. No significant changes in her EKG compared with the one in our system from 05/12/2022. CBC, CMP, PT/INR, PTT reviewed. PTT mildly elevated (31.5) but PT, INR unremarkable and platelets mildly elevated. Patient denies.bleeding or clotting symptoms or disorders. Would have her CBC and PTT repeat while at the hospital. Otherwise patient is medically optimized for the procedure\par \par May fax this note to [facility] [phone]

## 2023-02-06 NOTE — ASSESSMENT
[As per surgery] : as per surgery [FreeTextEntry4] : \par Ms. DEY is a 32 year female PMH of asthma, last crisis 2 years ago, never intubated, PCOS on spironolactone, anxiety and depression now off Cymbalta, Xanax QHS, and FM or RA? ankylosing spondylitis following up with rheum, herniated lumbar and cervical disks following up with PMR, pre-DM, HLD, transaminitis, HTN. who comes to the office for preoperative evaluation for a Gyn procedure. Patient has greater than 4 METS, is a low-moderate perioperative risk for Major adverse cardiac event. ECG and blood work reviewed. Pending labs and EKG form PST tomorrow and if unremarkable, patient would be medically optimized for this procedure. If EKG is abnormal, she will likely need cardiac clearance\par \par During conversation with patient extensive medical records were reviewed including but not limited to hospital records, out patient records, laboratory data \par In addition extensive time was also spent in reviewing diagnostic studies.\par \par Avoid NSAIDs, vitamins and aspirin containing products prior to surgery\par \par Counseling included abnormal lab results, differential diagnoses, treatment options, risks and benefits, lifestyle changes, current condition, medications, and dose adjustments. \par The patient was interactive, attentive, asked questions, and verbalized understanding.

## 2023-02-06 NOTE — HISTORY OF PRESENT ILLNESS
[No Pertinent Cardiac History] : no history of aortic stenosis, atrial fibrillation, coronary artery disease, recent myocardial infarction, or implantable device/pacemaker [Asthma] : asthma [No Adverse Anesthesia Reaction] : no adverse anesthesia reaction in self or family member [(Patient denies any chest pain, claudication, dyspnea on exertion, orthopnea, palpitations or syncope)] : Patient denies any chest pain, claudication, dyspnea on exertion, orthopnea, palpitations or syncope [____ METs%] : [unfilled] METs% [COPD] : no COPD [Sleep Apnea] : no sleep apnea [Smoker] : not a smoker [Chronic Anticoagulation] : no chronic anticoagulation [Chronic Kidney Disease] : no chronic kidney disease [Diabetes] : no diabetes [FreeTextEntry1] : [facility] biopsy [FreeTextEntry2] : 02/07/2023 [FreeTextEntry3] : Rosemary Mcbride [FreeTextEntry4] : Ms. COURTNEY DEY is a pleasant 32 year old female with PMH of asthma, last crisis 2 years ago, never intubated, PCOS on spironolactone, anxiety and depression now off Cymbalta, Xanax QHS, and FM or RA? ankylosing spondylitis following up with rheum, herniated lumbar and cervical disks following up with PMR, pre-DM, HLD, transaminitis, HTN. Patient is here for preop evaluation for a Con biopsy with her Gyn. Patient reports that is able to walk 4 blocks or a flight of stairs without getting chest pain, palpitations s or shortness of breath.\par She will have PST tomorrow\par \par Cardio: [facility]

## 2023-02-06 NOTE — RESULTS/DATA
[de-identified] : pending [de-identified] : pending [de-identified] : pending [de-identified] : pending

## 2023-02-19 ENCOUNTER — NON-APPOINTMENT (OUTPATIENT)
Age: 33
End: 2023-02-19

## 2023-03-07 ENCOUNTER — RX RENEWAL (OUTPATIENT)
Age: 33
End: 2023-03-07

## 2023-03-29 ENCOUNTER — RX RENEWAL (OUTPATIENT)
Age: 33
End: 2023-03-29

## 2023-04-11 RX ORDER — LIRAGLUTIDE 6 MG/ML
18 INJECTION, SOLUTION SUBCUTANEOUS
Qty: 1 | Refills: 1 | Status: DISCONTINUED | COMMUNITY
Start: 2023-01-25 | End: 2023-04-11

## 2023-04-18 NOTE — REASON FOR VISIT
[Follow - Up] : a follow-up visit [Adrenal Evaluation/Adrenal Disorder] : adrenal evaluation/adrenal disorder [PCOS] : PCOS

## 2023-04-19 ENCOUNTER — APPOINTMENT (OUTPATIENT)
Dept: ENDOCRINOLOGY | Facility: CLINIC | Age: 33
End: 2023-04-19
Payer: COMMERCIAL

## 2023-04-19 PROCEDURE — 99214 OFFICE O/P EST MOD 30 MIN: CPT | Mod: 95

## 2023-04-19 NOTE — REVIEW OF SYSTEMS
[Recent Weight Loss (___ Lbs)] : recent weight loss: [unfilled] lbs [Fatigue] : no fatigue [Visual Field Defect] : no visual field defect [Blurred Vision] : no blurred vision [Dysphagia] : no dysphagia [Neck Pain] : no neck pain [Chest Pain] : no chest pain [Palpitations] : no palpitations [Nausea] : no nausea [Constipation] : no constipation [Vomiting] : no vomiting [Diarrhea] : no diarrhea [Polyuria] : no polyuria [Polydipsia] : no polydipsia

## 2023-04-19 NOTE — ASSESSMENT
[FreeTextEntry1] : 1. Good evidence for PCOS, currently with oligomenorrhea and skin manifestations, with associated insulin resistance\par 2. Metabolic syndrome, with multiple manifestations including hyperlipidemia, hypertension, pre-diabetes, obesity, and likely fatty liver disease which appears concerning based on progressive liver enzyme elevations\par 3. Cushing's syndrome and primary hyperaldosteronism ruled out\par 4. pheochromocytoma ruled out by biochemical testing and lack of significant adrenal findings\par 5. Paraganglioma secreting norepinephrine, although rare, has not been completely ruled out. Plasma normetanephrines WNL makes this unlikely, however there was an elevation of urinary normetanephrines. If pt's mother in fact had von-hippel-lindau syndrome it is inherited as an autosomal dominant and can be associated with paragangliomas.\par \par Plan:\par 1. Encourage weight loss. Continue Wegovy 0.5 mg once a week \par 2. Continue f/u  with hepatology \par 3. repeat plasma and urinary metanephrines. \par

## 2023-04-19 NOTE — HISTORY OF PRESENT ILLNESS
[Home] : at home, [unfilled] , at the time of the visit. [Other Location: e.g. Home (Enter Location, City,State)___] : at [unfilled] [Verbal consent obtained from patient] : the patient, [unfilled] [FreeTextEntry1] : Last seen 2022\par \par Diagnosed with PCOS age 17, presenting with cystic acne, hirsutism, and irregular menses. Treated by gyn with OCP; during evaluation found to have a DHEAS level of 620. This prompted an adrenal CT which revealed a 8 mm cyst.\par OCP discontinued 1 year ago due to elevated triglycerides; since then menses have been irregular. Started on spironolactone 100 bid for the skin manifestations.\par , no current plans for pregnancy.\par \par \par Current Regimen: Wegovy 0.5 mg once a week (Started in 2023) started higher dose 1 week ago \par needs to take laxatives due to constipation, some nausea \par Mounjaro in past lost 15 pounds--> and then gained back \par \par Weight: has not weighed herself but feels like she lost inches \par \par PMH:\par obesity, with recent 30 pound weight gain\par hypertension, on amlodipine\par (episodes of exacerbated hypertension with anxiety and sweats. At least one episode of SVT documented in ER and placed on metoprolol)\par asthma\par psoriatic arthritis, on humira\par abnormal liver functions, prior radiology showing steatosis\par pre-diabetes\par \par family history:\par mother cholangiocarcinoma,  due to COVID. ? evaluated for von-Hippel \par \par social history:\par Pt. is an RN at [employer], 4 tower\par \par ROS:\par anxiety \par \par did not go for 24 hour urinb

## 2023-05-04 ENCOUNTER — APPOINTMENT (OUTPATIENT)
Dept: OBGYN | Facility: CLINIC | Age: 33
End: 2023-05-04

## 2023-05-10 ENCOUNTER — TRANSCRIPTION ENCOUNTER (OUTPATIENT)
Age: 33
End: 2023-05-10

## 2023-05-11 ENCOUNTER — LABORATORY RESULT (OUTPATIENT)
Age: 33
End: 2023-05-11

## 2023-05-29 ENCOUNTER — TRANSCRIPTION ENCOUNTER (OUTPATIENT)
Age: 33
End: 2023-05-29

## 2023-06-01 NOTE — ASU PREOP CHECKLIST - LAST TOOK
"OB Post-op  Section Progress Note POD# 0    S:  Patient doing well.  Pain well controlled with Tylenol, oxycodone and Toradol pain medication.  Not yet OOB. Tolerating crackers.  No N/V.  Passing flatus.  Voiding.  Bleeding normal.  Breastfeeding.    O:  /69   Pulse 60   Temp 97.6  F (36.4  C) (Oral)   Resp 18   Ht 1.575 m (5' 2\")   Wt 76.2 kg (168 lb)   SpO2 100%   Breastfeeding Unknown   BMI 30.73 kg/m    UOP- 24hr 1650, Since MN 1650  Gen- A&O, NAD  Abd- soft, non-tender, +BS, no rebound or guarding, fundus firm at umbilicus  Incision- C/D/I (bandage in place)  Ext- non-tender, trace edema. Pneumoboots in place lower extremities    Monroy with blood tinged urine    Hemoglobin   Date Value Ref Range Status   2023 12.4 11.7 - 15.7 g/dL Final     AB POS   Rubella immune    A/P:  41 year old  POD# 0 s/p PLTCS for arrest of dilation at 4 cm.    1.  Routine post-op cares  - Advance diet  - Pain control measures  - Discontinue monroy later this am (monitor for clearing of urine)  - Ambulate  2.  Heme  - QBL 1,170 mL, preop Hgb 12.4, post pending  - Monitor for symptoms of blood loss anemia  3.  Anticipate d/c home on POD#3    Gabriela Edmondson MD  2023  8:44 AM   " solids

## 2023-06-05 ENCOUNTER — RX RENEWAL (OUTPATIENT)
Age: 33
End: 2023-06-05

## 2023-06-08 ENCOUNTER — TRANSCRIPTION ENCOUNTER (OUTPATIENT)
Age: 33
End: 2023-06-08

## 2023-06-12 ENCOUNTER — TRANSCRIPTION ENCOUNTER (OUTPATIENT)
Age: 33
End: 2023-06-12

## 2023-07-10 ENCOUNTER — NON-APPOINTMENT (OUTPATIENT)
Age: 33
End: 2023-07-10

## 2023-07-11 ENCOUNTER — LABORATORY RESULT (OUTPATIENT)
Age: 33
End: 2023-07-11

## 2023-07-11 ENCOUNTER — APPOINTMENT (OUTPATIENT)
Dept: CARDIOLOGY | Facility: CLINIC | Age: 33
End: 2023-07-11
Payer: COMMERCIAL

## 2023-07-11 ENCOUNTER — NON-APPOINTMENT (OUTPATIENT)
Age: 33
End: 2023-07-11

## 2023-07-11 VITALS
SYSTOLIC BLOOD PRESSURE: 124 MMHG | WEIGHT: 200 LBS | RESPIRATION RATE: 16 BRPM | TEMPERATURE: 98.2 F | HEART RATE: 77 BPM | BODY MASS INDEX: 33.32 KG/M2 | DIASTOLIC BLOOD PRESSURE: 79 MMHG | HEIGHT: 65 IN | OXYGEN SATURATION: 99 %

## 2023-07-11 DIAGNOSIS — E78.1 PURE HYPERGLYCERIDEMIA: ICD-10-CM

## 2023-07-11 DIAGNOSIS — Z86.79 PERSONAL HISTORY OF OTHER DISEASES OF THE CIRCULATORY SYSTEM: ICD-10-CM

## 2023-07-11 DIAGNOSIS — E27.8 OTHER SPECIFIED DISORDERS OF ADRENAL GLAND: ICD-10-CM

## 2023-07-11 PROCEDURE — 93000 ELECTROCARDIOGRAM COMPLETE: CPT

## 2023-07-11 PROCEDURE — 99214 OFFICE O/P EST MOD 30 MIN: CPT | Mod: 25

## 2023-07-11 NOTE — REASON FOR VISIT
[CV Risk Factors and Non-Cardiac Disease] : CV risk factors and non-cardiac disease [Hyperlipidemia] : hyperlipidemia [FreeTextEntry1] : This is a 33-year-old female with past medical history significant for reactive airway disease, dyspepsia, status post recent surgery for right anterior patella tubular ligament repair, hyperlipidemia, and borderline hypertension, PCOS, intermittent palpitations, psoriatic arthritis and paraganglioma secreting norepinephrine, who comes in for cardiac/lipid consultation.\par \par The patient reports she was hospitalized at [facility] in May 2022 for hypertensive emergency and SVT and found to have adrenal nodule with elevated 24-hr urine metanephrines and elevated 24-hr urine cortisol. She was also found to have markedly elevated triglycerides and liver enzymes and was switched from fenofibrate to Vascepa.\par \par At present the patient is doing well. She is current taking Metoprolol 100 mg daily and is no longer taking amlodipine secondary to lower extremity swelling. She states her blood pressure has been well controlled at home,  may have some occasional diastolic readings of 90 mmHg. She states she was unable to tolerate Vascepa secondary to belching and myalgias. She continues to follow closely with endocrinologist NP Michelle Gibson for adrenal nodule, PCOS and weight loss. She was started on Wegovy in March 2023 and reported 10 lb weight loss but has gained some weight back as the medication is currently on back order. She rides her bike for exercise. She also notes new diagnosis of psoriatic arthritis and currently on Enbrel.\par \par She notes intermittent episodes of palpitations with associated diaphoresis. She states she has worn an event monitor in the past for these symptoms. She denies any chest pain, shortness of breath, VENTURA, dizziness, syncope or lower extremity swelling. \par \par She reports occasional alcohol use. Denies history of smoking or illicit drug use. \par \par Family history is significant for father with high triglycerides and high cholesterol.\par \par Cardiac risk factors include: HLD, HTN, psoriatic arthritis. 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

## 2023-07-11 NOTE — PHYSICAL EXAM
[Well Developed] : well developed [Well Nourished] : well nourished [No Acute Distress] : no acute distress [Normal Conjunctiva] : normal conjunctiva [Normal Venous Pressure] : normal venous pressure [No Carotid Bruit] : no carotid bruit [Normal S1, S2] : normal S1, S2 [No Rub] : no rub [5th Left ICS - MCL] : palpated at the 5th LICS in the midclavicular line [Normal] : normal [No Precordial Heave] : no precordial heave was noted [Normal Rate] : normal [Rhythm Regular] : regular [Normal S1] : normal S1 [Normal S2] : normal S2 [No Gallop] : no gallop heard [II] : a grade 2 [No Pitting Edema] : no pitting edema present [2+] : left 2+ [No Abnormalities] : the abdominal aorta was not enlarged and no bruit was heard [Clear Lung Fields] : clear lung fields [Good Air Entry] : good air entry [No Respiratory Distress] : no respiratory distress  [Soft] : abdomen soft [Non Tender] : non-tender [No Masses/organomegaly] : no masses/organomegaly [Normal Bowel Sounds] : normal bowel sounds [Normal Gait] : normal gait [No Edema] : no edema [No Cyanosis] : no cyanosis [No Clubbing] : no clubbing [No Varicosities] : no varicosities [No Rash] : no rash [No Skin Lesions] : no skin lesions [Moves all extremities] : moves all extremities [No Focal Deficits] : no focal deficits [Normal Speech] : normal speech [Alert and Oriented] : alert and oriented [Normal memory] : normal memory [Click] : a ~M click was heard [S3] : no S3 [S4] : no S4 [Distant] : the heart sounds were ~L not distant [Pericardial Rub] : no pericardial rub [Right Carotid Bruit] : no bruit heard over the right carotid [Left Carotid Bruit] : no bruit heard over the left carotid [Right Femoral Bruit] : no bruit heard over the right femoral artery [Left Femoral Bruit] : no bruit heard over the left femoral artery

## 2023-07-11 NOTE — DISCUSSION/SUMMARY
[FreeTextEntry1] : This is a 33-year-old white female with past medical history significant for reactive airway disease, dyspepsia, status post recent surgery for right anterior patella tubular ligament repair, hyperlipidemia, and borderline hypertension, who comes in for cardiac/lipid follow-up evaluation.  She denies chest pain, shortness of breath, dizziness or syncope.\par She has no history of rheumatic fever.  She does not drink excessive caffeine or alcohol.\par Her cardiac risk factors include hyperlipidemia and psoriatic arthritis is a risk enhancing feature.\par She reports that her father had high triglycerides and high cholesterol.\par Electrocardiogram done July 11, 2013 demonstrated normal sinus rhythm rate 76 bpm is otherwise unremarkable.\par The patient discontinued her amlodipine on her own and she is currently taking metoprolol succinate 100 mg daily for her hypertension.\par Her blood pressure is under reasonable control today.  She reports that she was unable to take Vascepa for her elevated triglycerides secondary to belching and abdominal cramping.  She reports that she developed elevated liver function tests on fenofibrate therapy but was also consuming alcohol socially.\par The patient will have new blood work done for lipid panel, and electrolytes.  Further recommendation will made after results of lipid panel available for my review.\par Lipid panel March 16, 2022 demonstrated cholesterol 242, triglycerides 205, HDL of 53, LDL calculated 148 mg/dL, and non-HDL cholesterol 189 mg/dL.\par The patient had a normal exercise stress test June 19, 2019.\par Electrocardiogram done April 1, 2022 demonstrate normal sinus rhythm rate of 85 bpm and is otherwise unremarkable.  The patient may have an early systolic click and I recommended she schedule a an echo Doppler examination to rule mitral valve prolapse\par I recommend the patient work with our registered dietitian, to achieve adequate dietary change, and weight loss.\par Hopefully with lifestyle modification, dietary intervention, the patient's blood pressure, triglycerides and lipid panel will improve.  She will also try to start on resistance training with her upper body using bands.\par I have explained to her that with weight loss and dietary change her lipid profile file may improve significantly.\par The patient understands that aerobic exercises must be increased to 40 minutes 4 times per week. A detailed discussion of lifestyle modification was done today. The patient has a good understanding of the diagnosis, and treatment plan. Lifestyle modification was also outlined.

## 2023-07-24 ENCOUNTER — TRANSCRIPTION ENCOUNTER (OUTPATIENT)
Age: 33
End: 2023-07-24

## 2023-08-07 ENCOUNTER — TRANSCRIPTION ENCOUNTER (OUTPATIENT)
Age: 33
End: 2023-08-07

## 2023-08-11 ENCOUNTER — TRANSCRIPTION ENCOUNTER (OUTPATIENT)
Age: 33
End: 2023-08-11

## 2023-08-14 ENCOUNTER — APPOINTMENT (OUTPATIENT)
Dept: INTERNAL MEDICINE | Facility: CLINIC | Age: 33
End: 2023-08-14

## 2023-08-22 ENCOUNTER — APPOINTMENT (OUTPATIENT)
Dept: FAMILY MEDICINE | Facility: CLINIC | Age: 33
End: 2023-08-22

## 2023-08-30 ENCOUNTER — APPOINTMENT (OUTPATIENT)
Dept: CARDIOLOGY | Facility: CLINIC | Age: 33
End: 2023-08-30
Payer: COMMERCIAL

## 2023-08-30 PROCEDURE — 93306 TTE W/DOPPLER COMPLETE: CPT

## 2023-09-01 ENCOUNTER — APPOINTMENT (OUTPATIENT)
Dept: ENDOCRINOLOGY | Facility: CLINIC | Age: 33
End: 2023-09-01
Payer: COMMERCIAL

## 2023-09-01 VITALS
WEIGHT: 201 LBS | DIASTOLIC BLOOD PRESSURE: 76 MMHG | SYSTOLIC BLOOD PRESSURE: 118 MMHG | HEART RATE: 78 BPM | BODY MASS INDEX: 33.49 KG/M2 | HEIGHT: 65 IN

## 2023-09-01 DIAGNOSIS — I10 ESSENTIAL (PRIMARY) HYPERTENSION: ICD-10-CM

## 2023-09-01 DIAGNOSIS — E66.3 OVERWEIGHT: ICD-10-CM

## 2023-09-01 DIAGNOSIS — L68.0 HIRSUTISM: ICD-10-CM

## 2023-09-01 PROCEDURE — 99214 OFFICE O/P EST MOD 30 MIN: CPT

## 2023-09-01 RX ORDER — AMLODIPINE BESYLATE 5 MG/1
5 TABLET ORAL DAILY
Qty: 90 | Refills: 1 | Status: DISCONTINUED | COMMUNITY
Start: 2022-05-12 | End: 2023-09-01

## 2023-09-01 RX ORDER — FUROSEMIDE 20 MG/1
20 TABLET ORAL DAILY
Qty: 90 | Refills: 1 | Status: DISCONTINUED | COMMUNITY
Start: 2022-06-09 | End: 2023-09-01

## 2023-09-01 RX ORDER — SPIRONOLACTONE 100 MG/1
100 TABLET ORAL
Qty: 180 | Refills: 1 | Status: DISCONTINUED | COMMUNITY
End: 2023-09-01

## 2023-09-01 RX ORDER — PEN NEEDLE, DIABETIC 32GX 5/32"
32G X 4 MM NEEDLE, DISPOSABLE MISCELLANEOUS
Qty: 1 | Refills: 1 | Status: DISCONTINUED | COMMUNITY
Start: 2023-01-04 | End: 2023-09-01

## 2023-09-01 RX ORDER — SEMAGLUTIDE 1 MG/.5ML
1 INJECTION, SOLUTION SUBCUTANEOUS
Qty: 1 | Refills: 1 | Status: DISCONTINUED | COMMUNITY
Start: 2023-03-15 | End: 2023-09-01

## 2023-09-01 NOTE — REASON FOR VISIT
[Adrenal Evaluation/Adrenal Disorder] : adrenal evaluation/adrenal disorder [PCOS] : PCOS [Follow - Up] : a follow-up visit

## 2023-09-01 NOTE — HISTORY OF PRESENT ILLNESS
[FreeTextEntry1] : Diagnosed with PCOS age 17, presenting with cystic acne, hirsutism, and irregular menses. Treated by gyn with OCP; during evaluation found to have a DHEAS level of 620. This prompted an adrenal CT which revealed a 8 mm cyst. OCP discontinued 1 year ago due to elevated triglycerides; since then menses have been irregular. Started on spironolactone 100 bid for the skin manifestations. ( Not on any more)  Periods are very heavy, regular not on birth control anymore.   , no current plans for pregnancy.   Current Regimen: Wegovy 0.5 mg once a week, has been off since 2023 due to medication shortages  needs to take laxatives due to constipation, some nausea  Mounjaro in past lost 15 pounds--> and then gained back  Never on MFN   Weight: fluctuating  PMH: obesity  hypertension, on amlodipine (episodes of exacerbated hypertension with anxiety and sweats. At least one episode of SVT documented in ER and placed on metoprolol) asthma psoriatic arthritis, on humira abnormal liver functions, prior radiology showing steatosis pre-diabetes Triglycerides very elevated--> seeing cardiologist   family history: mother cholangiocarcinoma,  due to COVID. ? evaluated for von-Hippel   social history: Pt. is an RN at [employer], 4 tower  ROS: anxiety

## 2023-09-01 NOTE — ASSESSMENT
[FreeTextEntry1] : 1. Good evidence for PCOS, with associated insulin resistance 2. Metabolic syndrome, with multiple manifestations including hyperlipidemia, hypertension, pre-diabetes, obesity, and likely fatty liver disease which appears concerning based on progressive liver enzyme elevations 3. Cushing's syndrome and primary hyperaldosteronism ruled out 4. pheochromocytoma ruled out by biochemical testing and lack of significant adrenal findings 5. Paraganglioma secreting norepinephrine, although rare, has not been completely ruled out. Plasma normetanephrines WNL makes this unlikely, however there was an elevation of urinary normetanephrines. If pt's mother in fact had von-hippel-lindau syndrome it is inherited as an autosomal dominant and can be associated with paragangliomas.  Plan: 1. Encourage weight loss.  Offered pt to go on MFN to help with glucose levels and may assist in weight loss, pt declines at this time. Will weight for Wegovy to come back in stock 2. Continue f/u  with hepatology, liver enzymes have been stable 3. Continue to follow up with cardiologist for elevated triglycerides

## 2023-09-05 RX ORDER — CLOBETASOL PROPIONATE 0.5 MG/ML
0.05 SOLUTION TOPICAL
Qty: 50 | Refills: 0 | Status: ACTIVE | COMMUNITY
Start: 2023-03-21

## 2023-09-18 ENCOUNTER — TRANSCRIPTION ENCOUNTER (OUTPATIENT)
Age: 33
End: 2023-09-18

## 2023-09-25 ENCOUNTER — APPOINTMENT (OUTPATIENT)
Dept: GASTROENTEROLOGY | Facility: CLINIC | Age: 33
End: 2023-09-25
Payer: COMMERCIAL

## 2023-09-25 ENCOUNTER — RESULT REVIEW (OUTPATIENT)
Age: 33
End: 2023-09-25

## 2023-09-25 VITALS
DIASTOLIC BLOOD PRESSURE: 78 MMHG | HEIGHT: 65 IN | WEIGHT: 200 LBS | SYSTOLIC BLOOD PRESSURE: 122 MMHG | OXYGEN SATURATION: 99 % | HEART RATE: 91 BPM | BODY MASS INDEX: 33.32 KG/M2

## 2023-09-25 PROCEDURE — 99213 OFFICE O/P EST LOW 20 MIN: CPT

## 2023-09-26 ENCOUNTER — APPOINTMENT (OUTPATIENT)
Dept: ULTRASOUND IMAGING | Facility: CLINIC | Age: 33
End: 2023-09-26

## 2023-09-26 ENCOUNTER — OUTPATIENT (OUTPATIENT)
Dept: OUTPATIENT SERVICES | Facility: HOSPITAL | Age: 33
LOS: 1 days | End: 2023-09-26
Payer: COMMERCIAL

## 2023-09-26 DIAGNOSIS — R74.8 ABNORMAL LEVELS OF OTHER SERUM ENZYMES: ICD-10-CM

## 2023-09-26 PROCEDURE — 76705 ECHO EXAM OF ABDOMEN: CPT

## 2023-09-26 PROCEDURE — 76705 ECHO EXAM OF ABDOMEN: CPT | Mod: 26

## 2023-09-27 LAB
INR PPP: 0.9 RATIO
PT BLD: 10.2 SEC

## 2023-10-15 ENCOUNTER — EMERGENCY (EMERGENCY)
Facility: HOSPITAL | Age: 33
LOS: 1 days | Discharge: HOME | End: 2023-10-15
Attending: EMERGENCY MEDICINE
Payer: COMMERCIAL

## 2023-10-15 VITALS
DIASTOLIC BLOOD PRESSURE: 102 MMHG | HEIGHT: 65 IN | WEIGHT: 196.43 LBS | HEART RATE: 89 BPM | OXYGEN SATURATION: 98 % | TEMPERATURE: 98 F | SYSTOLIC BLOOD PRESSURE: 139 MMHG | RESPIRATION RATE: 18 BRPM

## 2023-10-15 PROCEDURE — 93010 ELECTROCARDIOGRAM REPORT: CPT

## 2023-10-15 PROCEDURE — 99284 EMERGENCY DEPT VISIT MOD MDM: CPT

## 2023-10-15 PROCEDURE — 71045 X-RAY EXAM CHEST 1 VIEW: CPT

## 2023-10-15 PROCEDURE — 99283 EMERGENCY DEPT VISIT LOW MDM: CPT | Mod: 25

## 2023-10-15 PROCEDURE — 71045 X-RAY EXAM CHEST 1 VIEW: CPT | Mod: 26

## 2023-10-15 PROCEDURE — 93005 ELECTROCARDIOGRAM TRACING: CPT

## 2023-10-15 RX ORDER — ACETAMINOPHEN 500 MG
650 TABLET ORAL ONCE
Refills: 0 | Status: COMPLETED | OUTPATIENT
Start: 2023-10-15 | End: 2023-10-15

## 2023-10-15 RX ADMIN — Medication 650 MILLIGRAM(S): at 16:40
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
## 2023-10-15 NOTE — ED PROVIDER NOTE - NSFOLLOWUPINSTRUCTIONS_ED_ALL_ED_FT
Please take tylenol or motrin as needed    Rest    Apply ice as needed    Return if symptoms worsen or persist

## 2023-10-15 NOTE — ED PROVIDER NOTE - PATIENT PORTAL LINK FT
You can access the FollowMyHealth Patient Portal offered by [facility] by registering at the following website: [email or web address]. By joining [employer]’s FollowMyHealth portal, you will also be able to view your health information using other applications (apps) compatible with our system. You can access the FollowMyHealth Patient Portal offered by [facility] by registering at the following website: [email or web address]. By joining [employer]’s FollowMyHealth portal, you will also be able to view your health information using other applications (apps) compatible with our system.
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
## 2023-10-15 NOTE — CHART NOTE - NSCHARTNOTESELECT_GEN_ALL_CORE
Event Note [Dear  ___] : Dear  [unfilled], [Consult Letter:] : I had the pleasure of evaluating your patient, [unfilled]. [Please see my note below.] : Please see my note below. [Consult Closing:] : Thank you very much for allowing me to participate in the care of this patient.  If you have any questions, please do not hesitate to contact me. [Sincerely,] : Sincerely, [Barak Wells M.D., FACS, AIMEE] : Barak Wells M.D., FACS, [facility] [Director, [facility]] : Director, [facility] [[facility]] : [facility] [[facility]] : [facility]  [Certified in Neck Ultrasound/ ECNU & AIUM] : Certified in Neck Ultrasound/ ECNU & AIUM [] :  [Otolaryngology/Head & Neck Surgery] : Otolaryngology/Head & Neck Surgery [[facility]] : [facility]
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
## 2023-10-15 NOTE — ED PROVIDER NOTE - NS ED ATTENDING STATEMENT MOD
This was a shared visit with the KULWINDER. I reviewed and verified the documentation and independently performed the documented:

## 2023-10-15 NOTE — ED PROVIDER NOTE - PROGRESS NOTE DETAILS
CXR shows no e/o rib fractures or pneumo (wet read) pending official read by radiology, if any discrepancies will contact patient.  EKG NSR HR 78 patient cleared by PENELOPE

## 2023-10-18 ENCOUNTER — TRANSCRIPTION ENCOUNTER (OUTPATIENT)
Age: 33
End: 2023-10-18

## 2023-11-03 ENCOUNTER — LABORATORY RESULT (OUTPATIENT)
Age: 33
End: 2023-11-03

## 2023-11-06 ENCOUNTER — APPOINTMENT (OUTPATIENT)
Dept: ENDOCRINOLOGY | Facility: CLINIC | Age: 33
End: 2023-11-06
Payer: COMMERCIAL

## 2023-11-06 VITALS
OXYGEN SATURATION: 98 % | WEIGHT: 195 LBS | DIASTOLIC BLOOD PRESSURE: 66 MMHG | HEART RATE: 81 BPM | BODY MASS INDEX: 32.49 KG/M2 | SYSTOLIC BLOOD PRESSURE: 110 MMHG | HEIGHT: 65 IN

## 2023-11-06 DIAGNOSIS — E28.2 POLYCYSTIC OVARIAN SYNDROME: ICD-10-CM

## 2023-11-06 PROCEDURE — 99214 OFFICE O/P EST MOD 30 MIN: CPT

## 2023-11-06 RX ORDER — ETANERCEPT 25 MG/.5ML
25 SOLUTION SUBCUTANEOUS
Qty: 1 | Refills: 0 | Status: DISCONTINUED | COMMUNITY
Start: 2023-09-01 | End: 2023-11-06

## 2023-11-06 RX ORDER — CERTOLIZUMAB PEGOL 200 MG/ML
2 X 200 INJECTION, SOLUTION SUBCUTANEOUS
Refills: 0 | Status: ACTIVE | COMMUNITY

## 2023-12-01 ENCOUNTER — APPOINTMENT (OUTPATIENT)
Dept: GASTROENTEROLOGY | Facility: CLINIC | Age: 33
End: 2023-12-01
Payer: COMMERCIAL

## 2023-12-01 VITALS
WEIGHT: 194 LBS | OXYGEN SATURATION: 98 % | DIASTOLIC BLOOD PRESSURE: 99 MMHG | HEIGHT: 65 IN | RESPIRATION RATE: 16 BRPM | HEART RATE: 84 BPM | SYSTOLIC BLOOD PRESSURE: 137 MMHG | BODY MASS INDEX: 32.32 KG/M2

## 2023-12-01 DIAGNOSIS — K52.9 NONINFECTIVE GASTROENTERITIS AND COLITIS, UNSPECIFIED: ICD-10-CM

## 2023-12-01 DIAGNOSIS — R19.7 DIARRHEA, UNSPECIFIED: ICD-10-CM

## 2023-12-01 DIAGNOSIS — R14.0 ABDOMINAL DISTENSION (GASEOUS): ICD-10-CM

## 2023-12-01 DIAGNOSIS — R10.9 UNSPECIFIED ABDOMINAL PAIN: ICD-10-CM

## 2023-12-01 PROCEDURE — 99204 OFFICE O/P NEW MOD 45 MIN: CPT

## 2023-12-05 ENCOUNTER — TRANSCRIPTION ENCOUNTER (OUTPATIENT)
Age: 33
End: 2023-12-05

## 2023-12-10 ENCOUNTER — TRANSCRIPTION ENCOUNTER (OUTPATIENT)
Age: 33
End: 2023-12-10

## 2023-12-10 DIAGNOSIS — E55.9 VITAMIN D DEFICIENCY, UNSPECIFIED: ICD-10-CM

## 2023-12-10 LAB
25(OH)D3 SERPL-MCNC: 20.9 NG/ML
CRP SERPL-MCNC: <3 MG/L
ERYTHROCYTE [SEDIMENTATION RATE] IN BLOOD BY WESTERGREN METHOD: 23 MM/HR
FERRITIN SERPL-MCNC: 19 NG/ML
HCT VFR BLD CALC: 38 %
HGB BLD-MCNC: 12.4 G/DL
IRON SATN MFR SERPL: 13 %
IRON SERPL-MCNC: 54 UG/DL
MCHC RBC-ENTMCNC: 29.2 PG
MCHC RBC-ENTMCNC: 32.6 GM/DL
MCV RBC AUTO: 89.4 FL
PLATELET # BLD AUTO: 413 K/UL
RBC # BLD: 4.25 M/UL
RBC # FLD: 13.4 %
TIBC SERPL-MCNC: 413 UG/DL
UIBC SERPL-MCNC: 359 UG/DL
WBC # FLD AUTO: 7.68 K/UL

## 2023-12-10 RX ORDER — ERGOCALCIFEROL 1.25 MG/1
1.25 MG CAPSULE, LIQUID FILLED ORAL
Qty: 12 | Refills: 1 | Status: ACTIVE | COMMUNITY
Start: 2023-12-10 | End: 1900-01-01

## 2023-12-14 ENCOUNTER — LABORATORY RESULT (OUTPATIENT)
Age: 33
End: 2023-12-14

## 2023-12-15 ENCOUNTER — TRANSCRIPTION ENCOUNTER (OUTPATIENT)
Age: 33
End: 2023-12-15

## 2023-12-15 ENCOUNTER — APPOINTMENT (OUTPATIENT)
Dept: CARDIOLOGY | Facility: CLINIC | Age: 33
End: 2023-12-15
Payer: COMMERCIAL

## 2023-12-15 VITALS
SYSTOLIC BLOOD PRESSURE: 127 MMHG | HEART RATE: 88 BPM | WEIGHT: 190 LBS | HEIGHT: 65 IN | OXYGEN SATURATION: 97 % | RESPIRATION RATE: 16 BRPM | DIASTOLIC BLOOD PRESSURE: 83 MMHG | BODY MASS INDEX: 31.65 KG/M2 | TEMPERATURE: 98.1 F

## 2023-12-15 DIAGNOSIS — R03.0 ELEVATED BLOOD-PRESSURE READING, W/OUT DIAGNOSIS OF HYPERTENSION: ICD-10-CM

## 2023-12-15 PROCEDURE — 99213 OFFICE O/P EST LOW 20 MIN: CPT

## 2023-12-15 NOTE — PHYSICAL EXAM
[Well Developed] : well developed [Well Nourished] : well nourished [No Acute Distress] : no acute distress [Normal Conjunctiva] : normal conjunctiva [Normal Venous Pressure] : normal venous pressure [No Carotid Bruit] : no carotid bruit [Normal S1, S2] : normal S1, S2 [No Rub] : no rub [5th Left ICS - MCL] : palpated at the 5th LICS in the midclavicular line [Normal] : normal [No Precordial Heave] : no precordial heave was noted [Normal Rate] : normal [Rhythm Regular] : regular [Normal S1] : normal S1 [Normal S2] : normal S2 [No Gallop] : no gallop heard [S3] : no S3 [S4] : no S4 [Click] : a ~M click was heard [Pericardial Rub] : no pericardial rub [II] : a grade 2 [No Pitting Edema] : no pitting edema present [Right Carotid Bruit] : no bruit heard over the right carotid [Left Carotid Bruit] : no bruit heard over the left carotid [Right Femoral Bruit] : no bruit heard over the right femoral artery [Left Femoral Bruit] : no bruit heard over the left femoral artery [2+] : left 2+ [No Abnormalities] : the abdominal aorta was not enlarged and no bruit was heard [Clear Lung Fields] : clear lung fields [Good Air Entry] : good air entry [No Respiratory Distress] : no respiratory distress  [Soft] : abdomen soft [Non Tender] : non-tender [No Masses/organomegaly] : no masses/organomegaly [Normal Bowel Sounds] : normal bowel sounds [Normal Gait] : normal gait [No Edema] : no edema [No Cyanosis] : no cyanosis [No Clubbing] : no clubbing [No Varicosities] : no varicosities [No Rash] : no rash [No Skin Lesions] : no skin lesions [Moves all extremities] : moves all extremities [No Focal Deficits] : no focal deficits [Normal Speech] : normal speech [Alert and Oriented] : alert and oriented [Normal memory] : normal memory

## 2023-12-15 NOTE — DISCUSSION/SUMMARY
[FreeTextEntry1] : This is a 33-year-old white female with past medical history significant for reactive airway disease, dyspepsia, status post recent surgery for right anterior patella tubular ligament repair, hyperlipidemia, and borderline hypertension, who comes in for cardiac/lipid follow-up evaluation.  She denies chest pain, shortness of breath, dizziness or syncope. She has no history of rheumatic fever.  She does not drink excessive caffeine or alcohol. Her cardiac risk factors include hyperlipidemia, metabolic syndrome, and psoriatic arthritis is a risk enhancing feature. She reports that her father had high triglycerides and high cholesterol. Her blood pressure is in the upper limits of normal.  She is working on her diet and exercise program.  She is currently taking Rybelsus 7 mg daily and has discussed with her endocrinologist switching to Mounjaro/zepbound when it becomes available. Blood work done November 3, 2023 demonstrated cholesterol 230, triglycerides 202, HDL of 46, LDL calculated under 48 mg/dL and non-HDL cholesterol 184 mg/dL. Echo Doppler examination done August 30, 2023 demonstrated physiologic tricuspid valve regurgitation with normal left ventricular ejection fraction of 66%. The patient does not require lipid-lowering therapy at this time. She prefers to work on her diet, weight loss, and exercise program. She will follow-up with me in 6 months. With appropriate dietary changes, caloric reduction, and exercise, blood pressure is also continue to improve. Electrocardiogram done July 11, 2013 demonstrated normal sinus rhythm rate 76 bpm is otherwise unremarkable. The patient discontinued her amlodipine on her own and she is currently taking metoprolol succinate 100 mg daily for her hypertension. Her blood pressure is under reasonable control today.  She reports that she was unable to take Vascepa for her elevated triglycerides secondary to belching and abdominal cramping.  She reports that she developed elevated liver function tests on fenofibrate therapy but was also consuming alcohol socially. The patient will have new blood work done for lipid panel, and electrolytes.  Further recommendation will made after results of lipid panel available for my review. Lipid panel March 16, 2022 demonstrated cholesterol 242, triglycerides 205, HDL of 53, LDL calculated 148 mg/dL, and non-HDL cholesterol 189 mg/dL. The patient had a normal exercise stress test June 19, 2019. Electrocardiogram done April 1, 2022 demonstrate normal sinus rhythm rate of 85 bpm and is otherwise unremarkable.  The patient may have an early systolic click and I recommended she schedule a an echo Doppler examination to rule mitral valve prolapse I recommend the patient work with our registered dietitian, to achieve adequate dietary change, and weight loss. Hopefully with lifestyle modification, dietary intervention, the patient's blood pressure, triglycerides and lipid panel will improve.  She will also try to start on resistance training with her upper body using bands. I have explained to her that with weight loss and dietary change her lipid profile file may improve significantly. The patient understands that aerobic exercises must be increased to 40 minutes 4 times per week. A detailed discussion of lifestyle modification was done today. The patient has a good understanding of the diagnosis, and treatment plan. Lifestyle modification was also outlined.

## 2023-12-15 NOTE — REASON FOR VISIT
[CV Risk Factors and Non-Cardiac Disease] : CV risk factors and non-cardiac disease [Hyperlipidemia] : hyperlipidemia [FreeTextEntry1] : This is a 33-year-old female with past medical history significant for reactive airway disease, dyspepsia, status post recent surgery for right anterior patella tubular ligament repair, hyperlipidemia, and borderline hypertension, PCOS, intermittent palpitations, psoriatic arthritis and paraganglioma secreting norepinephrine, who comes in for cardiac/lipid consultation.\par  \par  The patient reports she was hospitalized at [facility] in May 2022 for hypertensive emergency and SVT and found to have adrenal nodule with elevated 24-hr urine metanephrines and elevated 24-hr urine cortisol. She was also found to have markedly elevated triglycerides and liver enzymes and was switched from fenofibrate to Vascepa.\par  \par  At present the patient is doing well. She is current taking Metoprolol 100 mg daily and is no longer taking amlodipine secondary to lower extremity swelling. She states her blood pressure has been well controlled at home,  may have some occasional diastolic readings of 90 mmHg. She states she was unable to tolerate Vascepa secondary to belching and myalgias. She continues to follow closely with endocrinologist NP Michelle Gibson for adrenal nodule, PCOS and weight loss. She was started on Wegovy in March 2023 and reported 10 lb weight loss but has gained some weight back as the medication is currently on back order. She rides her bike for exercise. She also notes new diagnosis of psoriatic arthritis and currently on Enbrel.\par  \par  She notes intermittent episodes of palpitations with associated diaphoresis. She states she has worn an event monitor in the past for these symptoms. She denies any chest pain, shortness of breath, VENTURA, dizziness, syncope or lower extremity swelling. \par  \par  She reports occasional alcohol use. Denies history of smoking or illicit drug use. \par  \par  Family history is significant for father with high triglycerides and high cholesterol.\par  \par  Cardiac risk factors include: HLD, HTN, psoriatic arthritis. 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

## 2023-12-18 ENCOUNTER — APPOINTMENT (OUTPATIENT)
Dept: HUMAN REPRODUCTION | Facility: CLINIC | Age: 33
End: 2023-12-18
Payer: COMMERCIAL

## 2023-12-18 PROCEDURE — 76830 TRANSVAGINAL US NON-OB: CPT

## 2023-12-18 PROCEDURE — 99205 OFFICE O/P NEW HI 60 MIN: CPT | Mod: 25

## 2023-12-18 PROCEDURE — 36415 COLL VENOUS BLD VENIPUNCTURE: CPT

## 2023-12-19 ENCOUNTER — RX RENEWAL (OUTPATIENT)
Age: 33
End: 2023-12-19

## 2023-12-27 ENCOUNTER — TRANSCRIPTION ENCOUNTER (OUTPATIENT)
Age: 33
End: 2023-12-27

## 2023-12-29 ENCOUNTER — TRANSCRIPTION ENCOUNTER (OUTPATIENT)
Age: 33
End: 2023-12-29

## 2023-12-29 ENCOUNTER — NON-APPOINTMENT (OUTPATIENT)
Age: 33
End: 2023-12-29

## 2023-12-29 LAB — CALPROTECTIN FECAL: <5 UG/G

## 2024-01-03 ENCOUNTER — TRANSCRIPTION ENCOUNTER (OUTPATIENT)
Age: 34
End: 2024-01-03

## 2024-01-03 RX ORDER — ORAL SEMAGLUTIDE 7 MG/1
7 TABLET ORAL
Qty: 90 | Refills: 1 | Status: DISCONTINUED | COMMUNITY
Start: 2023-11-06 | End: 2024-01-03

## 2024-01-05 ENCOUNTER — APPOINTMENT (OUTPATIENT)
Dept: GASTROENTEROLOGY | Facility: CLINIC | Age: 34
End: 2024-01-05
Payer: COMMERCIAL

## 2024-01-05 DIAGNOSIS — E61.1 IRON DEFICIENCY: ICD-10-CM

## 2024-01-05 PROCEDURE — 99443: CPT

## 2024-01-05 NOTE — PLAN
[TextEntry] : 1.  Low iron levels, though not anemic.  Referral to hematology for evaluation for IV iron as patient cannot tolerate oral iron it is causing abdominal cramping 2.  Follow-up in a month, at which point she would have had her rheumatology appointment and a discussion about possibly changing the Biologics with her rheumatologist.  Objectively, there is no GI evidence of inflammation at this point, so if the biologic needs to be switched it would probably best be done from a rheumatological perspective.

## 2024-01-05 NOTE — HISTORY OF PRESENT ILLNESS
[FreeTextEntry1] : Visit type: Telephonic (phone only) Patient Location: [place], [place] Provider Location: [address], [address] [place] [address] Consent obtained for visit:Yes Visit length: 30 minutes Others present: No  33-year-old lady history of GERD, obesity, anxiety, asthma, hypertension, hypertriglyceridemia, arthritis, steatosis, IBS, PCOS, lupus in her sister, cholangiocarcinoma with death in her mother, telephonic to evaluate for possible IBD.  On Cimzia for psoriatic arthritis, was feeling better actually on Humira but concern for DILI so was switched over. Chronic diarrhea, worse with processed foods, alcohol and stress.  Plan at last office visit December 2023: 1. calpro-normal (less than 5); last checked March 2021 36 2. vit D, fe levels; vitamin D 20.9; low normal ferritin 19 but low iron saturation at 13%.  CBC normal with a hemoglobin of 12.4 MCV 89.4 mildly elevated platelets at 413 normal white count of 7.7 started taking weekly vit D tried slow Fe, did not agree with stomach; will refer to Heme  3. if not better with 2nd dose of Cimzia, which is coming soon, would consider reintroducing Humira vs changing to Stelara (will speak with her Rheum about that option) - pending appointment in 2 weeks 4. bentyl trial - never picked it up, ok without it 5. also discussed Elavil, she had withdrawal from Cymbalta, afraid to start new anti-depressant but will consider since this dose is much lower and indication is IBS not mood 6. 1 mo f/u, telephonic ok, portal in the interim    COIVD + recently--dx Sat, 6 days of sx now week before intermittent episodes of diarrhea - v run down at the end of the week but then also dx with virus  bm/d 4 on a a bad day, 2 on a good day no blood no mucus no weight loss - at most 2 lb

## 2024-01-05 NOTE — ASSESSMENT
[FreeTextEntry1] : 33-year-old lady history of GERD, obesity, anxiety, asthma, hypertension, hypertriglyceridemia, arthritis, steatosis, IBS, PCOS, lupus in her sister, cholangiocarcinoma with death in her mother, telephonic to evaluate for possible IBD.  On Cimzia for psoriatic arthritis, was feeling better actually on Humira but concern for DILI so was switched over.Chronic diarrhea, worse with processed foods, alcohol and stress.  Currently on day 6 after COVID swab positive.

## 2024-01-10 ENCOUNTER — RESULT REVIEW (OUTPATIENT)
Age: 34
End: 2024-01-10

## 2024-01-10 ENCOUNTER — APPOINTMENT (OUTPATIENT)
Dept: FAMILY MEDICINE | Facility: CLINIC | Age: 34
End: 2024-01-10
Payer: COMMERCIAL

## 2024-01-10 VITALS
WEIGHT: 190 LBS | SYSTOLIC BLOOD PRESSURE: 112 MMHG | HEIGHT: 65 IN | DIASTOLIC BLOOD PRESSURE: 70 MMHG | BODY MASS INDEX: 31.65 KG/M2
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
## 2024-01-10 DIAGNOSIS — J45.909 UNSPECIFIED ASTHMA, UNCOMPLICATED: ICD-10-CM

## 2024-01-10 DIAGNOSIS — M54.9 DORSALGIA, UNSPECIFIED: ICD-10-CM

## 2024-01-10 PROCEDURE — 99214 OFFICE O/P EST MOD 30 MIN: CPT

## 2024-01-10 RX ORDER — MONTELUKAST 10 MG/1
10 TABLET, FILM COATED ORAL
Qty: 90 | Refills: 1 | Status: ACTIVE | COMMUNITY
Start: 1900-01-01 | End: 1900-01-01

## 2024-01-10 NOTE — HEALTH RISK ASSESSMENT
[0] : 2) Feeling down, depressed, or hopeless: Not at all (0) [PHQ-2 Negative - No further assessment needed] : PHQ-2 Negative - No further assessment needed [Never] : Never [[id number]] : 0

## 2024-01-10 NOTE — PLAN
Family [FreeTextEntry1] :  Nack and back pain 2/2 fall Seems muscular but will need xrays to rule out other causes -Advised to try cold/warm compresses -Avoid heavy lifting and overstretching -Continue stretching exercises -Advised to wear a back brace -Will do a trial of ibuprofen 600 mg TID with meals for 3 days and alternate with Tylenol Q 6 hrs -Can try Voltaren Gel to apply PRN -Will do back Xrays - WIll send Flexeril 5 mg QHS PRN, advised patient that may get drowsy and should not drive or operate machinery  -If no improvement with conservative management will refer to ortho/ do back MRI Alarm symptoms discussed including but not limited to worsening back pain, leg/back numbness, bowel or urinary incontinence, leg weakness and I advised the patient to go to the emergency department if these symptoms appear.   Asthma Controlled Continue Montelukast  Return to care: within 1 month for follow up or sooner should the need arise. Call or return for any questions.

## 2024-01-10 NOTE — PHYSICAL EXAM
[Normal] : affect was normal and insight and judgment were intact [de-identified] : mild right CVA tenderness and lumbar spinal tenderness and cervical spinal tenderness. No left straight raise test and equivocal mild left side [de-identified] : adequate strength and sensation of both legs and arms. Neg spurling tests b/l
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
## 2024-01-11 ENCOUNTER — OUTPATIENT (OUTPATIENT)
Dept: OUTPATIENT SERVICES | Facility: HOSPITAL | Age: 34
LOS: 1 days | End: 2024-01-11
Payer: COMMERCIAL

## 2024-01-11 ENCOUNTER — APPOINTMENT (OUTPATIENT)
Dept: RADIOLOGY | Facility: CLINIC | Age: 34
End: 2024-01-11
Payer: COMMERCIAL
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
## 2024-01-11 PROCEDURE — 72220 X-RAY EXAM SACRUM TAILBONE: CPT

## 2024-01-11 PROCEDURE — 72110 X-RAY EXAM L-2 SPINE 4/>VWS: CPT | Mod: 26

## 2024-01-11 PROCEDURE — 72050 X-RAY EXAM NECK SPINE 4/5VWS: CPT | Mod: 26

## 2024-01-11 PROCEDURE — 72220 X-RAY EXAM SACRUM TAILBONE: CPT | Mod: 26

## 2024-01-11 PROCEDURE — 72083 X-RAY EXAM ENTIRE SPI 4/5 VW: CPT

## 2024-01-11 PROCEDURE — 72070 X-RAY EXAM THORAC SPINE 2VWS: CPT | Mod: 26

## 2024-01-11 PROCEDURE — 72110 X-RAY EXAM L-2 SPINE 4/>VWS: CPT

## 2024-01-11 PROCEDURE — 72050 X-RAY EXAM NECK SPINE 4/5VWS: CPT

## 2024-01-11 PROCEDURE — 72070 X-RAY EXAM THORAC SPINE 2VWS: CPT

## 2024-01-24 ENCOUNTER — APPOINTMENT (OUTPATIENT)
Dept: HUMAN REPRODUCTION | Facility: CLINIC | Age: 34
End: 2024-01-24

## 2024-01-26 ENCOUNTER — RX RENEWAL (OUTPATIENT)
Age: 34
End: 2024-01-26

## 2024-02-13 ENCOUNTER — EMERGENCY (EMERGENCY)
Facility: HOSPITAL | Age: 34
LOS: 1 days | Discharge: HOME | End: 2024-02-13
Attending: EMERGENCY MEDICINE
Payer: COMMERCIAL

## 2024-02-13 VITALS
OXYGEN SATURATION: 100 % | WEIGHT: 177.91 LBS | HEIGHT: 65 IN | TEMPERATURE: 98 F | SYSTOLIC BLOOD PRESSURE: 129 MMHG | DIASTOLIC BLOOD PRESSURE: 91 MMHG | HEART RATE: 89 BPM | RESPIRATION RATE: 20 BRPM

## 2024-02-13 PROCEDURE — 99284 EMERGENCY DEPT VISIT MOD MDM: CPT

## 2024-02-13 PROCEDURE — 99053 MED SERV 10PM-8AM 24 HR FAC: CPT

## 2024-02-13 PROCEDURE — 99283 EMERGENCY DEPT VISIT LOW MDM: CPT

## 2024-02-13 PROCEDURE — 96372 THER/PROPH/DIAG INJ SC/IM: CPT

## 2024-02-13 RX ORDER — AMLODIPINE BESYLATE 2.5 MG/1
1 TABLET ORAL
Qty: 0 | Refills: 0 | DISCHARGE

## 2024-02-13 RX ORDER — KETOROLAC TROMETHAMINE 30 MG/ML
30 SYRINGE (ML) INJECTION ONCE
Refills: 0 | Status: DISCONTINUED | OUTPATIENT
Start: 2024-02-13 | End: 2024-02-13

## 2024-02-13 RX ORDER — METHOCARBAMOL 500 MG/1
1 TABLET, FILM COATED ORAL
Qty: 12 | Refills: 0
Start: 2024-02-13 | End: 2024-02-15

## 2024-02-13 RX ORDER — DULOXETINE HYDROCHLORIDE 30 MG/1
1 CAPSULE, DELAYED RELEASE ORAL
Qty: 0 | Refills: 0 | DISCHARGE

## 2024-02-13 RX ORDER — ALPRAZOLAM 0.25 MG
1 TABLET ORAL
Qty: 0 | Refills: 0 | DISCHARGE

## 2024-02-13 RX ORDER — IBUPROFEN 200 MG
1 TABLET ORAL
Qty: 0 | Refills: 0 | DISCHARGE

## 2024-02-13 RX ORDER — CYCLOBENZAPRINE HYDROCHLORIDE 10 MG/1
1 TABLET, FILM COATED ORAL
Qty: 0 | Refills: 0 | DISCHARGE

## 2024-02-13 RX ORDER — LIDOCAINE 4 G/100G
1 CREAM TOPICAL ONCE
Refills: 0 | Status: COMPLETED | OUTPATIENT
Start: 2024-02-13 | End: 2024-02-13

## 2024-02-13 RX ADMIN — LIDOCAINE 1 PATCH: 4 CREAM TOPICAL at 07:50

## 2024-02-13 RX ADMIN — Medication 30 MILLIGRAM(S): at 07:51
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
## 2024-02-13 NOTE — ED ADULT NURSE NOTE - SUICIDE SCREENING QUESTION 2
[FreeTextEntry1] : Patient is a 39 year old woman with a history of  chronic hypertension diagnosed in  2016 (on propranolol and HCTZ).\par \par She gave birth to her first child on December 20, 2020-> delivered with preeclampsia, treated with IV Magnesium and discharged home on Labetalol 300 mg TID and Procardia 60 mg daily.  \par \par Patient reports that her blood pressure readings taken at home recently were 130's systolic. \par \par Ms. Jacobson is a  in [place] and is currently working remote. \par \par Recent echocardiogram performed on February 12, 2021 demonstrated:\par Normal LV systolic function\par No segmental wall motion abnormalities\par NOrmal diastolic function\par Normal RV size and function\par MItral annular calcification.\par No MR\par \par Denies any issues with shortness of breath, palpitations or chest discomfort. She has not been exercising during these cold months inside.  No

## 2024-02-13 NOTE — ED PROVIDER NOTE - NS ED ROS FT
general no fever /chills  Neuro no ha dizzy visual change no paresthesia  msk + muscle pain Lateral L neck LUE LLE  skin no bruises/sores

## 2024-02-13 NOTE — ED PROVIDER NOTE - PATIENT PORTAL LINK FT
You can access the FollowMyHealth Patient Portal offered by [facility] by registering at the following website: [email or web address]. By joining [employer]’s FollowMyHealth portal, you will also be able to view your health information using other applications (apps) compatible with our system.
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
## 2024-02-13 NOTE — ED PROVIDER NOTE - NSFOLLOWUPINSTRUCTIONS_ED_ALL_ED_FT
Expect to feel sore a day or two  Ibuprofen over the counter 200 milligrams each pill, take 2-3 pills every 6 hours with food for pain  Take with food to protect your stomach from gastric irritation  Keep lidoderm patch on area of discomfort for 12 hours and then remove  Follow up with your doctor or return for any problems or concerns
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
## 2024-02-22 PROBLEM — I10 ESSENTIAL (PRIMARY) HYPERTENSION: Chronic | Status: ACTIVE | Noted: 2024-02-13

## 2024-02-22 PROBLEM — I47.10 SUPRAVENTRICULAR TACHYCARDIA, UNSPECIFIED: Chronic | Status: ACTIVE | Noted: 2024-02-13

## 2024-02-22 PROBLEM — F41.9 ANXIETY DISORDER, UNSPECIFIED: Chronic | Status: ACTIVE | Noted: 2024-02-13

## 2024-02-29 ENCOUNTER — APPOINTMENT (OUTPATIENT)
Dept: PHYSICAL MEDICINE AND REHAB | Facility: CLINIC | Age: 34
End: 2024-02-29
Payer: COMMERCIAL

## 2024-02-29 VITALS
RESPIRATION RATE: 15 BRPM | SYSTOLIC BLOOD PRESSURE: 109 MMHG | BODY MASS INDEX: 28.82 KG/M2 | HEIGHT: 65 IN | HEART RATE: 80 BPM | DIASTOLIC BLOOD PRESSURE: 77 MMHG | WEIGHT: 173 LBS

## 2024-02-29 DIAGNOSIS — M54.42 LUMBAGO WITH SCIATICA, LEFT SIDE: ICD-10-CM

## 2024-02-29 DIAGNOSIS — M54.41 LUMBAGO WITH SCIATICA, LEFT SIDE: ICD-10-CM

## 2024-02-29 DIAGNOSIS — S39.92XA UNSPECIFIED INJURY OF LOWER BACK, INITIAL ENCOUNTER: ICD-10-CM

## 2024-02-29 PROCEDURE — 99204 OFFICE O/P NEW MOD 45 MIN: CPT

## 2024-02-29 RX ORDER — DICYCLOMINE HYDROCHLORIDE 20 MG/1
20 TABLET ORAL EVERY 6 HOURS
Qty: 60 | Refills: 3 | Status: DISCONTINUED | COMMUNITY
Start: 2023-12-01 | End: 2024-02-29

## 2024-02-29 RX ORDER — CYCLOBENZAPRINE HYDROCHLORIDE 5 MG/1
5 TABLET, FILM COATED ORAL
Qty: 7 | Refills: 0 | Status: DISCONTINUED | COMMUNITY
Start: 2024-01-10 | End: 2024-02-29

## 2024-02-29 RX ORDER — IBUPROFEN 600 MG/1
600 TABLET, FILM COATED ORAL
Qty: 60 | Refills: 0 | Status: ACTIVE | COMMUNITY
Start: 2024-02-29 | End: 1900-01-01

## 2024-02-29 NOTE — DATA REVIEWED
[FreeTextEntry1] :  EXAM: [id number] - XR SACRUM COCCYX MIN 2 VIEWS  - ORDERED BY: ISAK MALDONADO  EXAM: [id number] - XR C SPINE 4 OR 5 VIEWS  - ORDERED BY: ISAK MALDONADO  EXAM: [id number] - XR T SPINE 2 VIEWS  - ORDERED BY: ISAK MALDONADO  EXAM: [id number] - XR LS SPINE FLEX EXT MIN 4V  - ORDERED BY: ISAK MALDONADO   PROCEDURE DATE:  01/11/2024    INTERPRETATION:  Clinical history: 33-year-old female, fall, back pain.  Five views of the cervical spine including obliques 2 views of the thoracic spine and 3 lateral views of the lumbar spine including flexion/extension images are correlated with the abdominal MRI of 5/17/2022 and the lumbar CT of 12/14/2021-3/20/2020.  FINDINGS: No fracture, spondylolisthesis or loss of vertebral body height, retropharyngeal edema or radiographic evidence of neuroforaminal narrowing.  Normal excursion during flexion and extension.  IMPRESSION:  No fracture or spondylolisthesis.  Mild degenerative disc and facet joint disease with normal excursion during flexion and extension, unchanged. If there is continued clinical concern follow-up MRI can be ordered  --- End of Report ---       BROOKLYN STRONG DO; Attending Radiologist This document has been electronically signed. Jan 13 2024 10:41PM

## 2024-02-29 NOTE — PHYSICAL EXAM
[FreeTextEntry1] : PE: Constitutional: In NAD, calm and cooperative MSK 	Inspection: no gross swelling identified 	Palpation: Tenderness of the bilateral thoracic paraspinals, lumbar paraspinals and over tailbone 	ROM: Mild pain at end lumbar lumbar extension/flexion 	Strength: 5/5 strength in bilateral upper and lower extremities 	Reflexes: 2+ Biceps/Brachioradialis/Triceps/patella/Achilles reflex bilaterally, Hoffmans/Clonus negative bilaterally 	Sensation: Intact to light touch in bilateral upper and lower extremities 	Special tests: SLR negative bilaterally, REESE/FADIR negative bilaterally
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
## 2024-03-01 ENCOUNTER — RX RENEWAL (OUTPATIENT)
Age: 34
End: 2024-03-01

## 2024-03-07 ENCOUNTER — OUTPATIENT (OUTPATIENT)
Dept: OUTPATIENT SERVICES | Facility: HOSPITAL | Age: 34
LOS: 1 days | End: 2024-03-07
Payer: COMMERCIAL

## 2024-03-07 ENCOUNTER — APPOINTMENT (OUTPATIENT)
Dept: MRI IMAGING | Facility: CLINIC | Age: 34
End: 2024-03-07
Payer: OTHER MISCELLANEOUS

## 2024-03-07 ENCOUNTER — APPOINTMENT (OUTPATIENT)
Dept: MRI IMAGING | Facility: CLINIC | Age: 34
End: 2024-03-07
Payer: COMMERCIAL

## 2024-03-07 DIAGNOSIS — M54.6 PAIN IN THORACIC SPINE: ICD-10-CM

## 2024-03-07 PROCEDURE — 72146 MRI CHEST SPINE W/O DYE: CPT | Mod: 26

## 2024-03-07 PROCEDURE — 72148 MRI LUMBAR SPINE W/O DYE: CPT | Mod: 26

## 2024-03-07 PROCEDURE — 72195 MRI PELVIS W/O DYE: CPT | Mod: 26

## 2024-03-07 PROCEDURE — 72195 MRI PELVIS W/O DYE: CPT

## 2024-03-07 PROCEDURE — 72146 MRI CHEST SPINE W/O DYE: CPT

## 2024-03-07 PROCEDURE — 72148 MRI LUMBAR SPINE W/O DYE: CPT

## 2024-03-13 ENCOUNTER — APPOINTMENT (OUTPATIENT)
Dept: PHYSICAL MEDICINE AND REHAB | Facility: CLINIC | Age: 34
End: 2024-03-13
Payer: COMMERCIAL

## 2024-03-13 DIAGNOSIS — M51.26 OTHER INTERVERTEBRAL DISC DISPLACEMENT, LUMBAR REGION: ICD-10-CM

## 2024-03-13 DIAGNOSIS — M54.2 CERVICALGIA: ICD-10-CM

## 2024-03-13 PROCEDURE — 99214 OFFICE O/P EST MOD 30 MIN: CPT | Mod: 95
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
## 2024-03-19 ENCOUNTER — OUTPATIENT (OUTPATIENT)
Dept: OUTPATIENT SERVICES | Facility: HOSPITAL | Age: 34
LOS: 1 days | End: 2024-03-19
Payer: COMMERCIAL

## 2024-03-19 ENCOUNTER — APPOINTMENT (OUTPATIENT)
Dept: MRI IMAGING | Facility: CLINIC | Age: 34
End: 2024-03-19
Payer: COMMERCIAL

## 2024-03-19 DIAGNOSIS — M54.2 CERVICALGIA: ICD-10-CM

## 2024-03-19 PROCEDURE — 72141 MRI NECK SPINE W/O DYE: CPT

## 2024-03-19 PROCEDURE — 72141 MRI NECK SPINE W/O DYE: CPT | Mod: 26

## 2024-03-22 ENCOUNTER — APPOINTMENT (OUTPATIENT)
Dept: ORTHOPEDIC SURGERY | Facility: CLINIC | Age: 34
End: 2024-03-22
Payer: COMMERCIAL

## 2024-03-22 VITALS
WEIGHT: 172 LBS | HEART RATE: 80 BPM | HEIGHT: 65 IN | BODY MASS INDEX: 28.66 KG/M2 | SYSTOLIC BLOOD PRESSURE: 113 MMHG | DIASTOLIC BLOOD PRESSURE: 78 MMHG

## 2024-03-22 DIAGNOSIS — M51.24 OTHER INTERVERTEBRAL DISC DISPLACEMENT, THORACIC REGION: ICD-10-CM

## 2024-03-22 DIAGNOSIS — M54.16 RADICULOPATHY, LUMBAR REGION: ICD-10-CM

## 2024-03-22 PROCEDURE — 99204 OFFICE O/P NEW MOD 45 MIN: CPT

## 2024-03-22 NOTE — PHYSICAL EXAM
[de-identified] : CONSTITUTIONAL: Patient is a very pleasant individual who is well-nourished and appears stated age. PSYCHIATRIC: Alert and oriented times three and in no apparent distress, and participates with orthopedic evaluation well. HEAD: Atraumatic and nonsyndromic in appearance. EENT: No thyromegaly, EOMI. RESPIRATORY: Respiratory rate is regular, not dyspneic on examination. LYMPHATICS: There is no cervical or axillary lymphadenopathy. INTEGUMENTARY: Skin is clean, dry, and intact about the bilateral upper extremities and cervical spine. VASCULAR: There is brisk capillary refill about the bilateral upper extremities and radial pulses are 2/4.  Cervical Spine Exam:  Shoulder Abduction (C5): 5/5 B/L Biceps (C6): 5/5 B/L Wrist Extension (C6): 5/5 B/L Triceps (C7): 5/5 B/L Wrist Flexion (C7): 5/5 B/L Finger Adduction/Abduction (C8/T1): 5/5 B/L Sensation:  SILT C5-T1 bilateral  Reflexes: 2+ reflexes Bicep/Tricep/Quadriceps/Achilles  Special Testing:  Positive Spurlings recreating neck pain  negative clonus BL LE negative Hoffmans B/L UE   Palpation: There is tenderness along the right-sided of the lower lumbar spine lumbosacral region and right-sided Thoraco lumbar paraspinal musculature Muscle Strength Testing: Hip Flexion: 5/5 B/L Knee Extension: 5/5 B/L Knee Flexion: 5/5 B/L Dorsiflexion: 5/5 B/L EHL: 5/5 B/L Plantarflexion: 5/5 B/L  Sensation: SILT L2-S1 B/L except: None  Special Testing:  Positive straight leg raise test right lower extremity   [de-identified] : MRI cervical spine performed on 3/19/2024 x-rays agile T1 and T2 weighted images my interpretation demonstrates there is central disc bulge at C4-C5 no significant stenosis C5-C6 central disc bulge/herniation mild indentation of the thecal sac no significant stenosis C6-C7 demonstrates central disc protrusion indentation of the thecal sac mild to moderate central stenosis no significant cord signal changes  MRI thoracic spineDemonstrates a moderate to large right lateral recess disc protrusion causing narrowing of the right foramen at the 6 T7 and T7-T8 no severe central stenosis no significant cord signal changes  MRI lumbar spine demonstrates L5-S1 disc space degeneration there is an annular tear with disc protrusion and extrusion behind the body of L5 possible right nerve root impingement of S1 and the lateral recess.

## 2024-03-22 NOTE — DISCUSSION/SUMMARY
[de-identified] : She has no restrictions from a spine standpoint though she will limit bending twisting lifting and high impact exercises to limit exacerbations For her low back pain I recommend follow-up with Dr. Brewster for a repeat epidural steroid injection and this could also help her thoracic spine If her symptoms do not improve in the thoracic spine we can consider thoracic injections versus addition of gabapentin however I do not believe this to be necessary I will see her back if her symptoms fail to resolve or if she has any issues in the future
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
## 2024-03-27 ENCOUNTER — APPOINTMENT (OUTPATIENT)
Dept: PHYSICAL MEDICINE AND REHAB | Facility: CLINIC | Age: 34
End: 2024-03-27
Payer: COMMERCIAL

## 2024-03-27 DIAGNOSIS — M50.20 OTHER CERVICAL DISC DISPLACEMENT, UNSPECIFIED CERVICAL REGION: ICD-10-CM

## 2024-03-27 DIAGNOSIS — M54.6 PAIN IN THORACIC SPINE: ICD-10-CM

## 2024-03-27 DIAGNOSIS — M51.36 OTHER INTERVERTEBRAL DISC DEGENERATION, LUMBAR REGION: ICD-10-CM

## 2024-03-27 PROCEDURE — 99441: CPT
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
## 2024-03-27 NOTE — DATA REVIEWED
[FreeTextEntry1] : EXAM: [id number] - MR SPINE CERVICAL  - ORDERED BY: NADYA COTTER   PROCEDURE DATE:  03/19/2024    INTERPRETATION:  MRI of the cervical spine  History: Neck pain  Technique:  Multiplanar, multi sequential images of the cervical spine were obtained without contrast using a standard protocol.  Prior study: Cervical spine radiographs dated 1/11/2024  Findings:  Bone: No acute fracture.  Alignment: No significant spondylolisthesis.  Disc spaces: Minimal disc desiccation at C4-C5, C5-C6, and C6-C7.  Spinal cord: Evaluation of the cord is limited due to motion artifact. However, no definite cord signal abnormality.  Paraspinal/Prevertebral soft tissues: Sphenoid sinus disease noted.  Evaluation of the individual motion segments demonstrates the following:  Evaluation of the individual levels is limited due to motion artifact.  C2/3 level: No canal or neuroforaminal stenosis.  C3/4 level: No canal or neuroforaminal stenosis.  C4/5 level: Tiny left central disc protrusion. No canal or neuroforaminal stenosis.  C5/6 level: Small left central disc protrusion. No canal or neuroforaminal stenosis.  C6/7 level: Small left central disc extrusion with cranial migration of up to 5 mm and caudal migration of up to 2 mm. Disc extrusion results in mild central canal narrowing. No neuroforaminal narrowing.  C7/T1 level: No canal or neuroforaminal stenosis.  Impression:  Limited evaluation due to motion artifact.  Degenerative changes throughout the cervical spine, as detailed above, worst at C6-C7.  No acute fracture.  --- End of Report ---       OXANA PUGH M.D., ATTENDING RADIOLOGIST This document has been electronically signed. Mar 22 2024  2:00PM
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
## 2024-04-12 ENCOUNTER — NON-APPOINTMENT (OUTPATIENT)
Age: 34
End: 2024-04-12

## 2024-04-29 ENCOUNTER — LABORATORY RESULT (OUTPATIENT)
Age: 34
End: 2024-04-29

## 2024-04-30 ENCOUNTER — APPOINTMENT (OUTPATIENT)
Dept: ENDOCRINOLOGY | Facility: CLINIC | Age: 34
End: 2024-04-30
Payer: COMMERCIAL

## 2024-04-30 DIAGNOSIS — R74.8 ABNORMAL LEVELS OF OTHER SERUM ENZYMES: ICD-10-CM

## 2024-04-30 DIAGNOSIS — E66.9 OBESITY, UNSPECIFIED: ICD-10-CM

## 2024-04-30 PROCEDURE — G2211 COMPLEX E/M VISIT ADD ON: CPT | Mod: NC,1L

## 2024-04-30 PROCEDURE — 99214 OFFICE O/P EST MOD 30 MIN: CPT

## 2024-04-30 NOTE — HISTORY OF PRESENT ILLNESS
[Other Location: e.g. School (Enter Location, City,State)___] : at [unfilled], at the time of the visit. [Medical Office: ([facility])___] : at the medical office located in  [Verbal consent obtained from patient] : the patient, [unfilled] [FreeTextEntry1] : Diagnosed with PCOS age 17, presenting with cystic acne, hirsutism, and irregular menses. Treated by gyn with OCP; during evaluation found to have a DHEAS level of 620. This prompted an adrenal CT which revealed a 8 mm cyst. OCP discontinued  due to elevated triglycerides; since then menses have been irregular. Started on spironolactone 100 bid for the skin manifestations. Now off spironolactone and having regular cycles , no current plans for pregnancy.  PMH: obesity, with recent 30 pound weight gain hypertension, on amlodipine (episodes of exacerbated hypertension with anxiety and sweats. At least one episode of SVT documented in ER and placed on metoprolol) asthma psoriatic arthritis, on humira abnormal liver functions, prior radiology showing steatosis. Significant improvement on mounjaro and wegovy, but off therapy due to access problems pre-diabetes  family history: mother cholangiocarcinoma,  due to COVID. ? evaluated for von-Hippel   social history: Pt. is an RN at [employer], 4 tower  ROS: anxiety  Improving with zepbound

## 2024-04-30 NOTE — ASSESSMENT
[FreeTextEntry1] : Metabolic syndrome with obesity, liver effects, hypertriglyceridemia and pre-diabetes All of the above improving with zepbound. May require an increase to 7.5 mg weekly if efficacy wanes

## 2024-05-06 ENCOUNTER — RX RENEWAL (OUTPATIENT)
Age: 34
End: 2024-05-06

## 2024-05-06 RX ORDER — CYCLOBENZAPRINE HYDROCHLORIDE 5 MG/1
5 TABLET, FILM COATED ORAL
Qty: 60 | Refills: 0 | Status: ACTIVE | COMMUNITY
Start: 2024-02-29 | End: 1900-01-01

## 2024-06-06 ENCOUNTER — TRANSCRIPTION ENCOUNTER (OUTPATIENT)
Age: 34
End: 2024-06-06

## 2024-06-06 RX ORDER — TIRZEPATIDE 7.5 MG/.5ML
7.5 INJECTION, SOLUTION SUBCUTANEOUS
Qty: 1 | Refills: 1 | Status: ACTIVE | COMMUNITY
Start: 2024-01-03 | End: 1900-01-01

## 2024-06-12 ENCOUNTER — RX RENEWAL (OUTPATIENT)
Age: 34
End: 2024-06-12

## 2024-06-13 ENCOUNTER — APPOINTMENT (OUTPATIENT)
Dept: CARDIOLOGY | Facility: CLINIC | Age: 34
End: 2024-06-13
Payer: COMMERCIAL

## 2024-06-13 ENCOUNTER — NON-APPOINTMENT (OUTPATIENT)
Age: 34
End: 2024-06-13

## 2024-06-13 VITALS
SYSTOLIC BLOOD PRESSURE: 108 MMHG | TEMPERATURE: 98 F | RESPIRATION RATE: 16 BRPM | DIASTOLIC BLOOD PRESSURE: 78 MMHG | OXYGEN SATURATION: 98 % | HEART RATE: 78 BPM | BODY MASS INDEX: 31.02 KG/M2 | HEIGHT: 60 IN | WEIGHT: 158 LBS

## 2024-06-13 DIAGNOSIS — E78.5 HYPERLIPIDEMIA, UNSPECIFIED: ICD-10-CM

## 2024-06-13 DIAGNOSIS — I10 ESSENTIAL (PRIMARY) HYPERTENSION: ICD-10-CM

## 2024-06-13 DIAGNOSIS — R01.1 CARDIAC MURMUR, UNSPECIFIED: ICD-10-CM

## 2024-06-13 PROCEDURE — G2211 COMPLEX E/M VISIT ADD ON: CPT | Mod: NC

## 2024-06-13 PROCEDURE — 93000 ELECTROCARDIOGRAM COMPLETE: CPT

## 2024-06-13 PROCEDURE — 99214 OFFICE O/P EST MOD 30 MIN: CPT | Mod: 25

## 2024-06-13 RX ORDER — METOPROLOL SUCCINATE 100 MG/1
100 TABLET, EXTENDED RELEASE ORAL
Qty: 90 | Refills: 1 | Status: ACTIVE | COMMUNITY
Start: 2022-05-18 | End: 1900-01-01

## 2024-06-13 NOTE — REASON FOR VISIT
[CV Risk Factors and Non-Cardiac Disease] : CV risk factors and non-cardiac disease [Hyperlipidemia] : hyperlipidemia [FreeTextEntry1] : This is a 34-year-old female with past medical history significant for reactive airway disease, dyspepsia, status post recent surgery for right anterior patella tubular ligament repair, hyperlipidemia, and borderline hypertension, PCOS, intermittent palpitations, psoriatic arthritis and paraganglioma secreting norepinephrine, who comes in for cardiac/lipid follow-up evaluation. Patient is lost approximately 50 pounds on Zepbound therapy and regular exercise.  She now complains of occasional palpitations.  PMH: The patient reports she was hospitalized at [facility] in May 2022 for hypertensive emergency and SVT and found to have adrenal nodule with elevated 24-hr urine metanephrines and elevated 24-hr urine cortisol. She was also found to have markedly elevated triglycerides and liver enzymes and was switched from fenofibrate to Vascepa.  She is current taking Metoprolol 100 mg daily and is no longer taking amlodipine secondary to lower extremity swelling. She states her blood pressure has been well controlled at home,  may have some occasional diastolic readings of 90 mmHg. She states she was unable to tolerate Vascepa secondary to belching and myalgias. She continues to follow closely with endocrinologist NP Michelle Gibson for adrenal nodule, PCOS and weight loss. She was started on Wegovy in March 2023 and reported 10 lb weight loss but has gained some weight back as the medication is currently on back order. She rides her bike for exercise. She also notes new diagnosis of psoriatic arthritis and currently on Enbrel.  Family history is significant for father with high triglycerides and high cholesterol. Cardiac risk factors include: HLD, HTN, psoriatic arthritis.   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

## 2024-06-13 NOTE — DISCUSSION/SUMMARY
[FreeTextEntry1] : This is a 34-year-old white female with past medical history significant for reactive airway disease, dyspepsia, status post recent surgery for right anterior patella tubular ligament repair, hyperlipidemia, and borderline hypertension, who comes in for cardiac/lipid follow-up evaluation.  The patient is complaining of occasional transient palpitations occurring after exercise or at rest.  She denies chest pain, shortness of breath, dizziness or syncope. She has no history of rheumatic fever.  She does not drink excessive caffeine or alcohol. Her cardiac risk factors include hyperlipidemia, metabolic syndrome, and psoriatic arthritis is a risk enhancing feature. Electrocardiogram done June 13, 2024 demonstrated normal sinus rhythm rate 78 bpm is otherwise unremarkable. I have recommend the patient schedule an event monitor to rule out significant arrhythmia or heart block. She reports that her father had high triglycerides and high cholesterol. The patient is lost approximately 50 pounds on Zepbound therapy, currently at 7.5 mg weekly. She is exercising on a regular basis.  She does have 3 alcoholic beverages per week and it is possible her symptoms of palpitations are occurring within a 48-hour window after consuming alcohol.  She does admit to increased palpitations after her menstrual cycle. I have asked her to increase her water intake, and during her menstrual cycle to use Pedialyte.  She will schedule a ZIO monitor to rule out significant arrhythmia or heart block. Lipid panel done December 14, 2023 demonstrated cholesterol 236, HDL 49, triglycerides 365, LDL calculated 123, non-HDL cholesterol 187, LDL direct 148 mg/dL with hemoglobin A1c of 5.9. The patient will continue to work on her diet, exercise program and increase water consumption.  She will follow-up with me in 6 weeks. Lipid panel can be repeated at that time. PMH: She is working on her diet and exercise program.  She is currently taking Rybelsus 7 mg daily and has discussed with her endocrinologist switching to Mounjaro/zepbound when it becomes available. Blood work done November 3, 2023 demonstrated cholesterol 230, triglycerides 202, HDL of 46, LDL calculated under 48 mg/dL and non-HDL cholesterol 184 mg/dL. Echo Doppler examination done August 30, 2023 demonstrated physiologic tricuspid valve regurgitation with normal left ventricular ejection fraction of 66%. The patient does not require lipid-lowering therapy at this time. She prefers to work on her diet, weight loss, and exercise program. She will follow-up with me in 6 months. With appropriate dietary changes, caloric reduction, and exercise, blood pressure is also continue to improve. Electrocardiogram done July 11, 2013 demonstrated normal sinus rhythm rate 76 bpm is otherwise unremarkable. The patient discontinued her amlodipine on her own and she is currently taking metoprolol succinate 100 mg daily for her hypertension. Her blood pressure is under reasonable control today.  She reports that she was unable to take Vascepa for her elevated triglycerides secondary to belching and abdominal cramping.  She reports that she developed elevated liver function tests on fenofibrate therapy but was also consuming alcohol socially. The patient will have new blood work done for lipid panel, and electrolytes.  Further recommendation will made after results of lipid panel available for my review. Lipid panel March 16, 2022 demonstrated cholesterol 242, triglycerides 205, HDL of 53, LDL calculated 148 mg/dL, and non-HDL cholesterol 189 mg/dL. The patient had a normal exercise stress test June 19, 2019. Electrocardiogram done April 1, 2022 demonstrate normal sinus rhythm rate of 85 bpm and is otherwise unremarkable.  The patient may have an early systolic click and I recommended she schedule a an echo Doppler examination to rule mitral valve prolapse I recommend the patient work with our registered dietitian, to achieve adequate dietary change, and weight loss. Hopefully with lifestyle modification, dietary intervention, the patient's blood pressure, triglycerides and lipid panel will improve.  She will also try to start on resistance training with her upper body using bands. I have explained to her that with weight loss and dietary change her lipid profile file may improve significantly. The patient understands that aerobic exercises must be increased to 40 minutes 4 times per week. A detailed discussion of lifestyle modification was done today. The patient has a good understanding of the diagnosis, and treatment plan. Lifestyle modification was also outlined.

## 2024-06-21 ENCOUNTER — APPOINTMENT (OUTPATIENT)
Dept: CARDIOLOGY | Facility: CLINIC | Age: 34
End: 2024-06-21
Payer: COMMERCIAL

## 2024-06-21 DIAGNOSIS — R00.2 PALPITATIONS: ICD-10-CM

## 2024-06-21 PROCEDURE — 93246 EXT ECG>7D<15D RECORDING: CPT

## 2024-06-22 ENCOUNTER — APPOINTMENT (OUTPATIENT)
Dept: ORTHOPEDIC SURGERY | Facility: CLINIC | Age: 34
End: 2024-06-22
Payer: COMMERCIAL

## 2024-06-22 ENCOUNTER — APPOINTMENT (OUTPATIENT)
Dept: MRI IMAGING | Facility: CLINIC | Age: 34
End: 2024-06-22
Payer: COMMERCIAL

## 2024-06-22 DIAGNOSIS — J45.909 UNSPECIFIED ASTHMA, UNCOMPLICATED: ICD-10-CM

## 2024-06-22 PROCEDURE — 73562 X-RAY EXAM OF KNEE 3: CPT | Mod: LT

## 2024-06-22 PROCEDURE — 99203 OFFICE O/P NEW LOW 30 MIN: CPT

## 2024-06-22 PROCEDURE — 73721 MRI JNT OF LWR EXTRE W/O DYE: CPT | Mod: LT

## 2024-06-22 NOTE — ASSESSMENT
[FreeTextEntry1] : Left knee internal derangement. Patient felt a pop in her knee, complains of lateral joint line pain and instability when walking. She has pain on Mc[place]rays and anterior drawer/lachman. STAT MRI ordered to evaluate for ACL/LMT.  - Rest, ice, NSAIDs PRN for pain. - Recommended hinged knee brace/crutches. Patient will purchase OTC> - Avoid painful activity. - All questions answered. - F/u after MRI.

## 2024-06-22 NOTE — PHYSICAL EXAM
[5___] : hamstring 5[unfilled]/5 [Positive] : positive anterior draw [] : patient ambulates without assistive device [Left] : left knee [Lateral] : lateral [[place]] : skyline [AP Standing] : anteroposterior standing [There are no fractures, subluxations or dislocations. No significant abnormalities are seen] : There are no fractures, subluxations or dislocations. No significant abnormalities are seen [TWNoteComboBox7] : flexion 115 degrees [de-identified] : extension 5 degrees

## 2024-06-22 NOTE — HISTORY OF PRESENT ILLNESS
[Dull/Aching] : dull/aching [Sharp] : sharp [Constant] : constant [Ice] : ice [] : no [FreeTextEntry1] : Left knee [FreeTextEntry3] : 6/22/24 [FreeTextEntry5] : Patient reports she [place], when she stood up and felt a pop [FreeTextEntry6] : Weakness [FreeTextEntry7] : Down to foot  [de-identified] : Movement

## 2024-06-25 ENCOUNTER — APPOINTMENT (OUTPATIENT)
Dept: ORTHOPEDIC SURGERY | Facility: CLINIC | Age: 34
End: 2024-06-25
Payer: COMMERCIAL

## 2024-06-25 VITALS — HEIGHT: 65 IN | WEIGHT: 158 LBS | BODY MASS INDEX: 26.33 KG/M2

## 2024-06-25 DIAGNOSIS — M79.18 MYALGIA, OTHER SITE: ICD-10-CM

## 2024-06-25 DIAGNOSIS — S83.8X2A SPRAIN OF OTHER SPECIFIED PARTS OF LEFT KNEE, INITIAL ENCOUNTER: ICD-10-CM

## 2024-06-25 DIAGNOSIS — M23.8X2 OTHER INTERNAL DERANGEMENTS OF LEFT KNEE: ICD-10-CM

## 2024-06-25 DIAGNOSIS — S89.92XD UNSPECIFIED INJURY OF LEFT LOWER LEG, SUBSEQUENT ENCOUNTER: ICD-10-CM

## 2024-06-25 PROCEDURE — 99204 OFFICE O/P NEW MOD 45 MIN: CPT

## 2024-06-25 PROCEDURE — L2397: CPT | Mod: LT

## 2024-06-25 PROCEDURE — L1833: CPT | Mod: LT

## 2024-06-26 PROBLEM — S89.92XD INJURY OF LEFT KNEE, SUBSEQUENT ENCOUNTER: Status: ACTIVE | Noted: 2024-06-25

## 2024-06-26 PROBLEM — M79.18 MUSCULOSKELETAL PAIN: Status: ACTIVE | Noted: 2024-06-25

## 2024-06-28 ENCOUNTER — APPOINTMENT (OUTPATIENT)
Dept: ORTHOPEDIC SURGERY | Facility: CLINIC | Age: 34
End: 2024-06-28
Payer: COMMERCIAL

## 2024-06-28 PROCEDURE — L1833: CPT | Mod: LT

## 2024-06-28 PROCEDURE — L2397: CPT | Mod: LT

## 2024-07-15 ENCOUNTER — TRANSCRIPTION ENCOUNTER (OUTPATIENT)
Age: 34
End: 2024-07-15

## 2024-07-16 ENCOUNTER — APPOINTMENT (OUTPATIENT)
Dept: ORTHOPEDIC SURGERY | Facility: CLINIC | Age: 34
End: 2024-07-16
Payer: COMMERCIAL

## 2024-07-16 ENCOUNTER — TRANSCRIPTION ENCOUNTER (OUTPATIENT)
Age: 34
End: 2024-07-16

## 2024-07-16 VITALS — WEIGHT: 158 LBS | BODY MASS INDEX: 26.33 KG/M2 | HEIGHT: 65 IN

## 2024-07-16 DIAGNOSIS — M25.562 PAIN IN LEFT KNEE: ICD-10-CM

## 2024-07-16 DIAGNOSIS — M23.8X2 OTHER INTERNAL DERANGEMENTS OF LEFT KNEE: ICD-10-CM

## 2024-07-16 DIAGNOSIS — S83.8X2A SPRAIN OF OTHER SPECIFIED PARTS OF LEFT KNEE, INITIAL ENCOUNTER: ICD-10-CM

## 2024-07-16 PROCEDURE — 99214 OFFICE O/P EST MOD 30 MIN: CPT

## 2024-07-17 ENCOUNTER — APPOINTMENT (OUTPATIENT)
Dept: PHYSICAL MEDICINE AND REHAB | Facility: CLINIC | Age: 34
End: 2024-07-17
Payer: COMMERCIAL

## 2024-07-17 VITALS
DIASTOLIC BLOOD PRESSURE: 69 MMHG | BODY MASS INDEX: 25.83 KG/M2 | WEIGHT: 155 LBS | RESPIRATION RATE: 15 BRPM | SYSTOLIC BLOOD PRESSURE: 103 MMHG | HEIGHT: 65 IN | HEART RATE: 93 BPM

## 2024-07-17 DIAGNOSIS — M79.18 MYALGIA, OTHER SITE: ICD-10-CM

## 2024-07-17 DIAGNOSIS — M51.26 OTHER INTERVERTEBRAL DISC DISPLACEMENT, LUMBAR REGION: ICD-10-CM

## 2024-07-17 DIAGNOSIS — M54.6 PAIN IN THORACIC SPINE: ICD-10-CM

## 2024-07-17 DIAGNOSIS — M51.36 OTHER INTERVERTEBRAL DISC DEGENERATION, LUMBAR REGION: ICD-10-CM

## 2024-07-17 PROCEDURE — G2211 COMPLEX E/M VISIT ADD ON: CPT | Mod: NC,1L

## 2024-07-17 PROCEDURE — 99214 OFFICE O/P EST MOD 30 MIN: CPT

## 2024-08-01 ENCOUNTER — TRANSCRIPTION ENCOUNTER (OUTPATIENT)
Age: 34
End: 2024-08-01

## 2024-08-06 ENCOUNTER — APPOINTMENT (OUTPATIENT)
Dept: ORTHOPEDIC SURGERY | Facility: CLINIC | Age: 34
End: 2024-08-06

## 2024-08-06 PROCEDURE — 99214 OFFICE O/P EST MOD 30 MIN: CPT

## 2024-08-06 NOTE — ADDENDUM
[FreeTextEntry1] :  Documented by Jose Bernstein acting as a scribe for Dr. Cormier and Bertram Higuera PA-C on 08/06/2024 and was presence for the following sections: Physical Exam; Data Reviewed; Assessment; Discussion/Summary. All medical record entries made by the Scribe were at my, Dr. Cormier, and Bertram Higuera, direction and personally dictated by me on 08/06/2024. I have reviewed the chart and agree that the record accurately reflects my personal performance of the history, physical exam, procedure and imaging.

## 2024-08-06 NOTE — DISCUSSION/SUMMARY
[de-identified] : Patient is progressing well with physical therapy. Patient will continue physical therapy for strengthening and stretching.  Continue playmaker brace use. Work letter provided. Patient will follow up in 6 weeks.  if fails will consider arth. LMR     ----------------------------------------------- Home Exercise The patient is instructed on a home exercise program.   DONTE BERNSTEIN Acting as a Scribe for Dr. Genia ROSAS, Donte Bernstein, attest that this documentation has been prepared under the direction and in the presence of Provider Dr. Cormier.   Activity Modification The patient was advised to modify their activities.   Dx / Natural History The patient was advised of the diagnosis.  The natural history of the pathology was explained in full to the patient in layman's terms.  Several different treatment options were discussed and explained in full to the patient including the risks and benefits of both surgical and non-surgical treatments.  All questions and concerns were answered.   Pain Guide Activities The patient was advised to let pain guide the gradual advancement of activities.   RITA ROSAS explained to the patient that rest, ice, compression, and elevation would benefit them.  They may return to activity after follow-up or when they no longer have any pain.   The patient's current medication management of their orthopedic diagnosis was reviewed today: (1) We discussed a comprehensive treatment plan that included possible pharmaceutical management involving the use of prescription strength medications including but not limited to options such as oral Naprosyn 500mg BID, once daily Meloxicam 15 mg, or 500-650 mg Tylenol versus over the counter oral medications and topical prescription NSAID Pennsaid vs over the counter Voltaren gel. (2) There is a moderate risk of morbidity with further treatment, especially from use of prescription strength medications and possible side effects of these medications which include upset stomach with oral medications, skin reactions to topical medications and cardiac/renal issues with long term use. (3) I recommended that the patient follow-up with their medical physician to discuss any significant specific potential issues with long term medication use such as interactions with current medications or with exacerbation of underlying medical comorbidities. (4) The benefits and risks associated with use of injectable, oral or topical, prescription and over the counter anti-inflammatory medications were discussed with the patient. The patient voiced understanding of the risks including but not limited to bleeding, stroke, kidney dysfunction, heart disease, and were referred to the black box warning label for further information.
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
## 2024-08-06 NOTE — PHYSICAL EXAM
[de-identified] : Neurovascularly intact distally    Left Knee:  stable valgus with extension  1+ valgus laxity at 30 degrees flexion  no effusion  ROM 0-120

## 2024-08-06 NOTE — DATA REVIEWED
[FreeTextEntry1] : 6/22/24 OC MRI Left Knee  This scan was reviewed and interpreted by Dr. Cormier, and his findings are-  Impression: 1. Findings consistent with free edge tear of the body of the lateral meniscus, which appears blunted and slightly extruded laterally, with moderate synovitis surrounding the body without well-defined parameniscal cyst. 2. Mild fibular collateral ligament sprain, mild popliteus tenosynovitis, moderate synovitis surrounding the peripheral lateral femoral condyle, mild thickening of the iliotibial band with slight strain of the biceps femoris tendon insertion. 3. Mild effusion, mild infrapatellar synovitis, slight anterior prepatella soft tissue swelling and small popliteal cyst. 4. Mild quadriceps tendinopathy. 5. No medial meniscal tear, acute osseous injury or loose body.

## 2024-08-19 ENCOUNTER — TRANSCRIPTION ENCOUNTER (OUTPATIENT)
Age: 34
End: 2024-08-19

## 2024-08-22 ENCOUNTER — APPOINTMENT (OUTPATIENT)
Dept: CARDIOLOGY | Facility: CLINIC | Age: 34
End: 2024-08-22

## 2024-09-29 NOTE — ED PROVIDER NOTE - ENMT NEGATIVE STATEMENT, MLM
complains of pain/discomfort
Ears: no ear pain and no hearing problems. Nose: no nasal congestion and no nasal drainage. Mouth/Throat: no dysphagia, no hoarseness and no throat pain. Neck: no lumps, no pain, no stiffness and no swollen glands.

## 2024-10-28 ENCOUNTER — APPOINTMENT (OUTPATIENT)
Dept: INTERNAL MEDICINE | Facility: CLINIC | Age: 34
End: 2024-10-28

## 2024-12-09 ENCOUNTER — OFFICE (OUTPATIENT)
Dept: URBAN - METROPOLITAN AREA CLINIC 12 | Facility: CLINIC | Age: 34
Setting detail: OPHTHALMOLOGY
End: 2024-12-09
Payer: COMMERCIAL

## 2024-12-09 DIAGNOSIS — H52.13: ICD-10-CM

## 2024-12-09 PROCEDURE — SCREF LASIK EVAL: Performed by: OPHTHALMOLOGY

## 2024-12-09 ASSESSMENT — TONOMETRY
OD_IOP_MMHG: 13
OS_IOP_MMHG: 12

## 2024-12-09 ASSESSMENT — CONFRONTATIONAL VISUAL FIELD TEST (CVF)
OD_FINDINGS: FULL
OS_FINDINGS: FULL

## 2024-12-10 ASSESSMENT — REFRACTION_CURRENTRX
OD_OVR_VA: 20/
OD_AXIS: 158
OS_SPHERE: -4.50
OS_CYLINDER: -0.50
OD_SPHERE: -4.75
OD_SPHERE: -5.25
OD_OVR_VA: 20/
OS_AXIS: 124
OD_AXIS: 060
OS_VPRISM_DIRECTION: SV
OS_OVR_VA: 20/
OS_AXIS: 155
OD_CYLINDER: -0.50
OD_CYLINDER: -0.25
OS_OVR_VA: 20/
OS_CYLINDER: -0.50
OS_SPHERE: -4.75
OD_VPRISM_DIRECTION: SV

## 2024-12-10 ASSESSMENT — REFRACTION_AUTOREFRACTION
OD_SPHERE: +1.75
OD_CYLINDER: -0.75
OS_CYLINDER: -0.75
OS_SPHERE: +1.75
OS_AXIS: 180
OD_AXIS: 007

## 2024-12-10 ASSESSMENT — KERATOMETRY
OS_K2POWER_DIOPTERS: 41.00
OS_K1POWER_DIOPTERS: 39.75
OD_K1POWER_DIOPTERS: 39.25
OS_AXISANGLE_DEGREES: 097
OD_AXISANGLE_DEGREES: 083
OD_K2POWER_DIOPTERS: 40.50

## 2024-12-10 ASSESSMENT — REFRACTION_MANIFEST
OD_AXIS: 155
OD_AXIS: 005
OS_AXIS: 160
OS_VA1: 20/20
OD_VA1: 20/20
OD_SPHERE: -5.25
OD_VA1: 20/20
OS_SPHERE: +1.25
OD_CYLINDER: -0.50
OD_AXIS: 155
OS_SPHERE: -4.75
OD_VA1: 20/20
OS_SPHERE: -4.75
OD_SPHERE: +2.25
OD_SPHERE: -5.50
OS_AXIS: 160
OS_VA1: 20/25+2
OD_CYLINDER: -0.50
OD_CYLINDER: -0.50
OU_VA: 20/20
OS_SPHERE: -5.00
OS_CYLINDER: -0.50
OS_VA1: 20/20
OD_CYLINDER: -1.25
OS_CYLINDER: -0.50
OS_VA1: 20/20-1
OD_VA1: 20/20-1
OU_VA: 20/20
OS_AXIS: 160
OD_SPHERE: -5.75
OS_CYLINDER: -1.00
OD_AXIS: 155
OS_CYLINDER: -0.50
OS_AXIS: 180

## 2024-12-10 ASSESSMENT — VISUAL ACUITY
OD_BCVA: 20/25+2
OS_BCVA: 20/20

## 2024-12-13 ENCOUNTER — APPOINTMENT (OUTPATIENT)
Dept: PHYSICAL MEDICINE AND REHAB | Facility: CLINIC | Age: 34
End: 2024-12-13
Payer: COMMERCIAL

## 2024-12-13 VITALS
RESPIRATION RATE: 14 BRPM | WEIGHT: 158 LBS | HEIGHT: 65 IN | BODY MASS INDEX: 26.33 KG/M2 | SYSTOLIC BLOOD PRESSURE: 118 MMHG | DIASTOLIC BLOOD PRESSURE: 86 MMHG | HEART RATE: 84 BPM

## 2024-12-13 DIAGNOSIS — M51.26 OTHER INTERVERTEBRAL DISC DISPLACEMENT, LUMBAR REGION: ICD-10-CM

## 2024-12-13 DIAGNOSIS — M54.2 CERVICALGIA: ICD-10-CM

## 2024-12-13 DIAGNOSIS — M79.10 MYALGIA, UNSPECIFIED SITE: ICD-10-CM

## 2024-12-13 DIAGNOSIS — M51.360: ICD-10-CM

## 2024-12-13 DIAGNOSIS — M54.6 PAIN IN THORACIC SPINE: ICD-10-CM

## 2024-12-13 PROCEDURE — 20553 NJX 1/MLT TRIGGER POINTS 3/>: CPT

## 2024-12-13 PROCEDURE — 99214 OFFICE O/P EST MOD 30 MIN: CPT | Mod: 25

## 2024-12-13 RX ORDER — MELOXICAM 15 MG/1
15 TABLET ORAL
Qty: 30 | Refills: 0 | Status: ACTIVE | COMMUNITY
Start: 2024-12-13 | End: 1900-01-01

## 2024-12-16 ENCOUNTER — RX RENEWAL (OUTPATIENT)
Age: 34
End: 2024-12-16

## 2025-02-12 DIAGNOSIS — R53.83 OTHER FATIGUE: ICD-10-CM

## 2025-02-13 LAB
25(OH)D3 SERPL-MCNC: 20.1 NG/ML
ALBUMIN SERPL ELPH-MCNC: 4.2 G/DL
ALP BLD-CCNC: 88 U/L
ALT SERPL-CCNC: 17 U/L
ANION GAP SERPL CALC-SCNC: 11 MMOL/L
AST SERPL-CCNC: 17 U/L
BASOPHILS # BLD AUTO: 0.08 K/UL
BASOPHILS NFR BLD AUTO: 0.8 %
BILIRUB SERPL-MCNC: 0.3 MG/DL
BUN SERPL-MCNC: 7 MG/DL
CALCIUM SERPL-MCNC: 8.9 MG/DL
CHLORIDE SERPL-SCNC: 104 MMOL/L
CHOLEST SERPL-MCNC: 199 MG/DL
CO2 SERPL-SCNC: 23 MMOL/L
CREAT SERPL-MCNC: 0.68 MG/DL
EGFR: 119 ML/MIN/1.73M2
EOSINOPHIL # BLD AUTO: 0.19 K/UL
EOSINOPHIL NFR BLD AUTO: 1.9 %
ESTIMATED AVERAGE GLUCOSE: 97 MG/DL
GLUCOSE SERPL-MCNC: 75 MG/DL
HBA1C MFR BLD HPLC: 5 %
HCT VFR BLD CALC: 36.8 %
HDLC SERPL-MCNC: 51 MG/DL
HGB BLD-MCNC: 12.2 G/DL
IMM GRANULOCYTES NFR BLD AUTO: 0.3 %
LDLC SERPL CALC-MCNC: 124 MG/DL
LYMPHOCYTES # BLD AUTO: 2.22 K/UL
LYMPHOCYTES NFR BLD AUTO: 22.3 %
MAN DIFF?: NORMAL
MCHC RBC-ENTMCNC: 30.6 PG
MCHC RBC-ENTMCNC: 33.2 G/DL
MCV RBC AUTO: 92.2 FL
MONOCYTES # BLD AUTO: 0.82 K/UL
MONOCYTES NFR BLD AUTO: 8.2 %
NEUTROPHILS # BLD AUTO: 6.63 K/UL
NEUTROPHILS NFR BLD AUTO: 66.5 %
NONHDLC SERPL-MCNC: 148 MG/DL
PLATELET # BLD AUTO: 409 K/UL
POTASSIUM SERPL-SCNC: 4.3 MMOL/L
PROT SERPL-MCNC: 7.1 G/DL
RBC # BLD: 3.99 M/UL
RBC # FLD: 12.5 %
SODIUM SERPL-SCNC: 138 MMOL/L
TRIGL SERPL-MCNC: 135 MG/DL
TSH SERPL-ACNC: 1.26 UIU/ML
WBC # FLD AUTO: 9.97 K/UL

## 2025-02-14 ENCOUNTER — APPOINTMENT (OUTPATIENT)
Dept: INTERNAL MEDICINE | Facility: CLINIC | Age: 35
End: 2025-02-14
Payer: COMMERCIAL

## 2025-02-14 VITALS
BODY MASS INDEX: 26.91 KG/M2 | DIASTOLIC BLOOD PRESSURE: 74 MMHG | HEIGHT: 65 IN | SYSTOLIC BLOOD PRESSURE: 124 MMHG | HEART RATE: 80 BPM | WEIGHT: 161.5 LBS

## 2025-02-14 DIAGNOSIS — F41.9 ANXIETY DISORDER, UNSPECIFIED: ICD-10-CM

## 2025-02-14 DIAGNOSIS — N39.0 URINARY TRACT INFECTION, SITE NOT SPECIFIED: ICD-10-CM

## 2025-02-14 DIAGNOSIS — B37.31 ACUTE CANDIDIASIS OF VULVA AND VAGINA: ICD-10-CM

## 2025-02-14 DIAGNOSIS — E55.9 VITAMIN D DEFICIENCY, UNSPECIFIED: ICD-10-CM

## 2025-02-14 DIAGNOSIS — Z00.00 ENCOUNTER FOR GENERAL ADULT MEDICAL EXAMINATION W/OUT ABNORMAL FINDINGS: ICD-10-CM

## 2025-02-14 DIAGNOSIS — J45.909 UNSPECIFIED ASTHMA, UNCOMPLICATED: ICD-10-CM

## 2025-02-14 DIAGNOSIS — L40.50 ARTHROPATHIC PSORIASIS, UNSPECIFIED: ICD-10-CM

## 2025-02-14 LAB
APPEARANCE: CLEAR
BACTERIA: ABNORMAL /HPF
BILIRUBIN URINE: NEGATIVE
BLOOD URINE: NEGATIVE
CAST: 2 /LPF
COLOR: NORMAL
EPITHELIAL CELLS: 12 /HPF
GLUCOSE QUALITATIVE U: NEGATIVE MG/DL
HYALINE CASTS: PRESENT
KETONES URINE: NEGATIVE MG/DL
LEUKOCYTE ESTERASE URINE: NEGATIVE
MICROSCOPIC-UA: NORMAL
NITRITE URINE: NEGATIVE
PH URINE: 5.5
PROTEIN URINE: NORMAL MG/DL
RED BLOOD CELLS URINE: 7 /HPF
REVIEW: NORMAL
SPECIFIC GRAVITY URINE: 1.03
UROBILINOGEN URINE: 0.2 MG/DL
WHITE BLOOD CELLS URINE: 6 /HPF
YEAST-LIKE CELLS: PRESENT

## 2025-02-14 PROCEDURE — 99395 PREV VISIT EST AGE 18-39: CPT

## 2025-02-14 PROCEDURE — 36415 COLL VENOUS BLD VENIPUNCTURE: CPT

## 2025-02-14 RX ORDER — ERGOCALCIFEROL 1.25 MG/1
1.25 MG CAPSULE, LIQUID FILLED ORAL
Qty: 12 | Refills: 3 | Status: ACTIVE | COMMUNITY
Start: 2025-02-14 | End: 1900-01-01

## 2025-02-14 RX ORDER — NITROFURANTOIN (MONOHYDRATE/MACROCRYSTALS) 25; 75 MG/1; MG/1
100 CAPSULE ORAL
Qty: 10 | Refills: 0 | Status: ACTIVE | COMMUNITY
Start: 2025-02-14 | End: 1900-01-01

## 2025-02-14 RX ORDER — FLUCONAZOLE 150 MG/1
150 TABLET ORAL
Qty: 1 | Refills: 1 | Status: ACTIVE | COMMUNITY
Start: 2025-02-14 | End: 1900-01-01

## 2025-02-16 LAB — BACTERIA UR CULT: NORMAL

## 2025-02-19 ENCOUNTER — APPOINTMENT (OUTPATIENT)
Dept: ENDOCRINOLOGY | Facility: CLINIC | Age: 35
End: 2025-02-19
Payer: COMMERCIAL

## 2025-02-19 VITALS
SYSTOLIC BLOOD PRESSURE: 112 MMHG | OXYGEN SATURATION: 99 % | DIASTOLIC BLOOD PRESSURE: 78 MMHG | HEIGHT: 65 IN | WEIGHT: 158 LBS | BODY MASS INDEX: 26.33 KG/M2 | HEART RATE: 77 BPM

## 2025-02-19 DIAGNOSIS — E66.9 OBESITY, UNSPECIFIED: ICD-10-CM

## 2025-02-19 DIAGNOSIS — E27.9 DISORDER OF ADRENAL GLAND, UNSPECIFIED: ICD-10-CM

## 2025-02-19 PROCEDURE — 99214 OFFICE O/P EST MOD 30 MIN: CPT

## 2025-02-19 PROCEDURE — G2211 COMPLEX E/M VISIT ADD ON: CPT

## 2025-03-11 ENCOUNTER — RX RENEWAL (OUTPATIENT)
Age: 35
End: 2025-03-11

## 2025-05-04 ENCOUNTER — NON-APPOINTMENT (OUTPATIENT)
Age: 35
End: 2025-05-04

## 2025-05-22 ENCOUNTER — RX RENEWAL (OUTPATIENT)
Age: 35
End: 2025-05-22

## 2025-05-23 ENCOUNTER — APPOINTMENT (OUTPATIENT)
Dept: PHYSICAL MEDICINE AND REHAB | Facility: CLINIC | Age: 35
End: 2025-05-23

## 2025-05-23 VITALS
DIASTOLIC BLOOD PRESSURE: 73 MMHG | BODY MASS INDEX: 25.83 KG/M2 | HEART RATE: 86 BPM | WEIGHT: 155 LBS | SYSTOLIC BLOOD PRESSURE: 107 MMHG | RESPIRATION RATE: 15 BRPM | HEIGHT: 65 IN

## 2025-05-23 DIAGNOSIS — M79.10 MYALGIA, UNSPECIFIED SITE: ICD-10-CM

## 2025-05-23 DIAGNOSIS — M54.6 PAIN IN THORACIC SPINE: ICD-10-CM

## 2025-05-23 DIAGNOSIS — M51.26 OTHER INTERVERTEBRAL DISC DISPLACEMENT, LUMBAR REGION: ICD-10-CM

## 2025-05-23 DIAGNOSIS — M54.2 CERVICALGIA: ICD-10-CM

## 2025-05-23 PROCEDURE — 99214 OFFICE O/P EST MOD 30 MIN: CPT | Mod: 25

## 2025-05-23 PROCEDURE — 20553 NJX 1/MLT TRIGGER POINTS 3/>: CPT

## 2025-06-09 ENCOUNTER — RX RENEWAL (OUTPATIENT)
Age: 35
End: 2025-06-09

## 2025-06-11 ENCOUNTER — RX RENEWAL (OUTPATIENT)
Age: 35
End: 2025-06-11

## 2025-06-26 ENCOUNTER — APPOINTMENT (OUTPATIENT)
Dept: ORTHOPEDIC SURGERY | Facility: CLINIC | Age: 35
End: 2025-06-26

## 2025-06-26 PROBLEM — M25.531 RIGHT WRIST PAIN: Status: ACTIVE | Noted: 2025-06-26

## 2025-06-27 ENCOUNTER — APPOINTMENT (OUTPATIENT)
Dept: ORTHOPEDIC SURGERY | Facility: CLINIC | Age: 35
End: 2025-06-27
Payer: COMMERCIAL

## 2025-06-27 VITALS
WEIGHT: 155 LBS | DIASTOLIC BLOOD PRESSURE: 86 MMHG | SYSTOLIC BLOOD PRESSURE: 125 MMHG | HEIGHT: 65 IN | BODY MASS INDEX: 25.83 KG/M2

## 2025-06-27 PROBLEM — M25.641 STIFFNESS OF JOINTS OF BOTH HANDS: Status: ACTIVE | Noted: 2025-06-27

## 2025-06-27 PROBLEM — S69.81XA TFCC (TRIANGULAR FIBROCARTILAGE COMPLEX) INJURY, RIGHT, INITIAL ENCOUNTER: Status: ACTIVE | Noted: 2025-06-27

## 2025-06-27 PROBLEM — M25.531 PAIN IN BOTH WRISTS: Status: ACTIVE | Noted: 2025-06-27

## 2025-06-27 PROBLEM — G56.23 CUBITAL TUNNEL SYNDROME, BILATERAL: Status: ACTIVE | Noted: 2025-06-27

## 2025-06-27 PROBLEM — M65.931 TENOSYNOVITIS OF RIGHT WRIST: Status: ACTIVE | Noted: 2025-06-27

## 2025-06-27 PROCEDURE — 20550 NJX 1 TENDON SHEATH/LIGAMENT: CPT | Mod: RT,59

## 2025-06-27 PROCEDURE — 73110 X-RAY EXAM OF WRIST: CPT | Mod: 50

## 2025-06-27 PROCEDURE — 20605 DRAIN/INJ JOINT/BURSA W/O US: CPT | Mod: RT,59

## 2025-06-27 PROCEDURE — 99205 OFFICE O/P NEW HI 60 MIN: CPT | Mod: 25

## 2025-08-11 ENCOUNTER — EMERGENCY (EMERGENCY)
Facility: HOSPITAL | Age: 35
LOS: 1 days | End: 2025-08-11
Attending: EMERGENCY MEDICINE
Payer: COMMERCIAL

## 2025-08-11 VITALS
SYSTOLIC BLOOD PRESSURE: 106 MMHG | TEMPERATURE: 98 F | DIASTOLIC BLOOD PRESSURE: 73 MMHG | HEART RATE: 88 BPM | RESPIRATION RATE: 23 BRPM | OXYGEN SATURATION: 100 %

## 2025-08-11 VITALS
WEIGHT: 153 LBS | RESPIRATION RATE: 18 BRPM | OXYGEN SATURATION: 100 % | TEMPERATURE: 98 F | HEART RATE: 85 BPM | DIASTOLIC BLOOD PRESSURE: 110 MMHG | SYSTOLIC BLOOD PRESSURE: 144 MMHG

## 2025-08-11 LAB
ALBUMIN SERPL ELPH-MCNC: 4.3 G/DL — SIGNIFICANT CHANGE UP (ref 3.3–5.2)
ALP SERPL-CCNC: 120 U/L — SIGNIFICANT CHANGE UP (ref 40–120)
ALT FLD-CCNC: 13 U/L — SIGNIFICANT CHANGE UP
ANION GAP SERPL CALC-SCNC: 16 MMOL/L — SIGNIFICANT CHANGE UP (ref 5–17)
AST SERPL-CCNC: 17 U/L — SIGNIFICANT CHANGE UP
BASOPHILS # BLD AUTO: 0.05 K/UL — SIGNIFICANT CHANGE UP (ref 0–0.2)
BASOPHILS NFR BLD AUTO: 0.5 % — SIGNIFICANT CHANGE UP (ref 0–2)
BILIRUB SERPL-MCNC: 0.3 MG/DL — LOW (ref 0.4–2)
BUN SERPL-MCNC: 10.5 MG/DL — SIGNIFICANT CHANGE UP (ref 8–20)
CALCIUM SERPL-MCNC: 9.1 MG/DL — SIGNIFICANT CHANGE UP (ref 8.4–10.5)
CHLORIDE SERPL-SCNC: 100 MMOL/L — SIGNIFICANT CHANGE UP (ref 96–108)
CO2 SERPL-SCNC: 22 MMOL/L — SIGNIFICANT CHANGE UP (ref 22–29)
CREAT SERPL-MCNC: 0.56 MG/DL — SIGNIFICANT CHANGE UP (ref 0.5–1.3)
EGFR: 124 ML/MIN/1.73M2 — SIGNIFICANT CHANGE UP
EGFR: 124 ML/MIN/1.73M2 — SIGNIFICANT CHANGE UP
EOSINOPHIL # BLD AUTO: 0.19 K/UL — SIGNIFICANT CHANGE UP (ref 0–0.5)
EOSINOPHIL NFR BLD AUTO: 1.8 % — SIGNIFICANT CHANGE UP (ref 0–6)
GLUCOSE SERPL-MCNC: 76 MG/DL — SIGNIFICANT CHANGE UP (ref 70–99)
HCG SERPL-ACNC: <4 MIU/ML — SIGNIFICANT CHANGE UP
HCT VFR BLD CALC: 36.4 % — SIGNIFICANT CHANGE UP (ref 34.5–45)
HGB BLD-MCNC: 12 G/DL — SIGNIFICANT CHANGE UP (ref 11.5–15.5)
IMM GRANULOCYTES # BLD AUTO: 0.04 K/UL — SIGNIFICANT CHANGE UP (ref 0–0.07)
IMM GRANULOCYTES NFR BLD AUTO: 0.4 % — SIGNIFICANT CHANGE UP (ref 0–0.9)
LYMPHOCYTES # BLD AUTO: 2.39 K/UL — SIGNIFICANT CHANGE UP (ref 1–3.3)
LYMPHOCYTES NFR BLD AUTO: 22.3 % — SIGNIFICANT CHANGE UP (ref 13–44)
MCHC RBC-ENTMCNC: 29.1 PG — SIGNIFICANT CHANGE UP (ref 27–34)
MCHC RBC-ENTMCNC: 33 G/DL — SIGNIFICANT CHANGE UP (ref 32–36)
MCV RBC AUTO: 88.1 FL — SIGNIFICANT CHANGE UP (ref 80–100)
MONOCYTES # BLD AUTO: 0.75 K/UL — SIGNIFICANT CHANGE UP (ref 0–0.9)
MONOCYTES NFR BLD AUTO: 7 % — SIGNIFICANT CHANGE UP (ref 2–14)
NEUTROPHILS # BLD AUTO: 7.29 K/UL — SIGNIFICANT CHANGE UP (ref 1.8–7.4)
NEUTROPHILS NFR BLD AUTO: 68 % — SIGNIFICANT CHANGE UP (ref 43–77)
NRBC # BLD AUTO: 0 K/UL — SIGNIFICANT CHANGE UP (ref 0–0)
NRBC # FLD: 0 K/UL — SIGNIFICANT CHANGE UP (ref 0–0)
NRBC BLD AUTO-RTO: 0 /100 WBCS — SIGNIFICANT CHANGE UP (ref 0–0)
PLATELET # BLD AUTO: 447 K/UL — HIGH (ref 150–400)
PMV BLD: 9 FL — SIGNIFICANT CHANGE UP (ref 7–13)
POTASSIUM SERPL-MCNC: 4.1 MMOL/L — SIGNIFICANT CHANGE UP (ref 3.5–5.3)
POTASSIUM SERPL-SCNC: 4.1 MMOL/L — SIGNIFICANT CHANGE UP (ref 3.5–5.3)
PROT SERPL-MCNC: 7.7 G/DL — SIGNIFICANT CHANGE UP (ref 6.6–8.7)
RBC # BLD: 4.13 M/UL — SIGNIFICANT CHANGE UP (ref 3.8–5.2)
RBC # FLD: 12.8 % — SIGNIFICANT CHANGE UP (ref 10.3–14.5)
SODIUM SERPL-SCNC: 138 MMOL/L — SIGNIFICANT CHANGE UP (ref 135–145)
WBC # BLD: 10.71 K/UL — HIGH (ref 3.8–10.5)
WBC # FLD AUTO: 10.71 K/UL — HIGH (ref 3.8–10.5)

## 2025-08-11 PROCEDURE — 70450 CT HEAD/BRAIN W/O DYE: CPT | Mod: 26,59

## 2025-08-11 PROCEDURE — 93010 ELECTROCARDIOGRAM REPORT: CPT

## 2025-08-11 PROCEDURE — 72125 CT NECK SPINE W/O DYE: CPT

## 2025-08-11 PROCEDURE — 96375 TX/PRO/DX INJ NEW DRUG ADDON: CPT | Mod: XU

## 2025-08-11 PROCEDURE — 99285 EMERGENCY DEPT VISIT HI MDM: CPT

## 2025-08-11 PROCEDURE — 70496 CT ANGIOGRAPHY HEAD: CPT

## 2025-08-11 PROCEDURE — 70450 CT HEAD/BRAIN W/O DYE: CPT

## 2025-08-11 PROCEDURE — 93005 ELECTROCARDIOGRAM TRACING: CPT

## 2025-08-11 PROCEDURE — 70496 CT ANGIOGRAPHY HEAD: CPT | Mod: 26

## 2025-08-11 PROCEDURE — 84702 CHORIONIC GONADOTROPIN TEST: CPT

## 2025-08-11 PROCEDURE — 70498 CT ANGIOGRAPHY NECK: CPT

## 2025-08-11 PROCEDURE — 96374 THER/PROPH/DIAG INJ IV PUSH: CPT | Mod: XU

## 2025-08-11 PROCEDURE — 70498 CT ANGIOGRAPHY NECK: CPT | Mod: 26

## 2025-08-11 PROCEDURE — 85025 COMPLETE CBC W/AUTO DIFF WBC: CPT

## 2025-08-11 PROCEDURE — 99285 EMERGENCY DEPT VISIT HI MDM: CPT | Mod: 25

## 2025-08-11 PROCEDURE — 80053 COMPREHEN METABOLIC PANEL: CPT

## 2025-08-11 PROCEDURE — 36415 COLL VENOUS BLD VENIPUNCTURE: CPT

## 2025-08-11 PROCEDURE — 83690 ASSAY OF LIPASE: CPT

## 2025-08-11 PROCEDURE — 72125 CT NECK SPINE W/O DYE: CPT | Mod: 26

## 2025-08-11 RX ORDER — KETOROLAC TROMETHAMINE 30 MG/ML
15 INJECTION, SOLUTION INTRAMUSCULAR; INTRAVENOUS ONCE
Refills: 0 | Status: DISCONTINUED | OUTPATIENT
Start: 2025-08-11 | End: 2025-08-11

## 2025-08-11 RX ORDER — METOCLOPRAMIDE HCL 10 MG
10 TABLET ORAL ONCE
Refills: 0 | Status: COMPLETED | OUTPATIENT
Start: 2025-08-11 | End: 2025-08-11

## 2025-08-11 RX ORDER — LIDOCAINE HYDROCHLORIDE 20 MG/ML
1 JELLY TOPICAL ONCE
Refills: 0 | Status: COMPLETED | OUTPATIENT
Start: 2025-08-11 | End: 2025-08-11

## 2025-08-11 RX ORDER — MECLIZINE HCL 12.5 MG
25 TABLET ORAL ONCE
Refills: 0 | Status: COMPLETED | OUTPATIENT
Start: 2025-08-11 | End: 2025-08-11

## 2025-08-11 RX ORDER — MECLIZINE HCL 12.5 MG
1 TABLET ORAL
Qty: 28 | Refills: 0
Start: 2025-08-11 | End: 2025-08-24

## 2025-08-11 RX ADMIN — Medication 10 MILLIGRAM(S): at 17:04

## 2025-08-11 RX ADMIN — Medication 25 MILLIGRAM(S): at 17:05

## 2025-08-11 RX ADMIN — KETOROLAC TROMETHAMINE 15 MILLIGRAM(S): 30 INJECTION, SOLUTION INTRAMUSCULAR; INTRAVENOUS at 17:04

## 2025-08-11 RX ADMIN — Medication 1000 MILLILITER(S): at 17:04

## 2025-08-11 RX ADMIN — LIDOCAINE HYDROCHLORIDE 1 PATCH: 20 JELLY TOPICAL at 17:05

## 2025-08-13 ENCOUNTER — APPOINTMENT (OUTPATIENT)
Dept: PHYSICAL MEDICINE AND REHAB | Facility: CLINIC | Age: 35
End: 2025-08-13
Payer: COMMERCIAL

## 2025-08-13 VITALS
WEIGHT: 153 LBS | BODY MASS INDEX: 25.49 KG/M2 | HEART RATE: 93 BPM | RESPIRATION RATE: 14 BRPM | HEIGHT: 65 IN | DIASTOLIC BLOOD PRESSURE: 75 MMHG | SYSTOLIC BLOOD PRESSURE: 112 MMHG

## 2025-08-13 DIAGNOSIS — M54.2 CERVICALGIA: ICD-10-CM

## 2025-08-13 DIAGNOSIS — M79.10 MYALGIA, UNSPECIFIED SITE: ICD-10-CM

## 2025-08-13 DIAGNOSIS — M54.9 DORSALGIA, UNSPECIFIED: ICD-10-CM

## 2025-08-13 DIAGNOSIS — G89.29 DORSALGIA, UNSPECIFIED: ICD-10-CM

## 2025-08-13 DIAGNOSIS — M47.812 SPONDYLOSIS W/OUT MYELOPATHY OR RADICULOPATHY, CERVICAL REGION: ICD-10-CM

## 2025-08-13 PROCEDURE — 99214 OFFICE O/P EST MOD 30 MIN: CPT | Mod: 25

## 2025-08-13 PROCEDURE — 20553 NJX 1/MLT TRIGGER POINTS 3/>: CPT

## 2025-08-14 DIAGNOSIS — R42 DIZZINESS AND GIDDINESS: ICD-10-CM

## 2025-08-14 DIAGNOSIS — H53.8 OTHER VISUAL DISTURBANCES: ICD-10-CM

## 2025-08-14 DIAGNOSIS — J45.909 UNSPECIFIED ASTHMA, UNCOMPLICATED: ICD-10-CM

## 2025-08-14 DIAGNOSIS — I10 ESSENTIAL (PRIMARY) HYPERTENSION: ICD-10-CM

## 2025-08-14 DIAGNOSIS — M54.2 CERVICALGIA: ICD-10-CM

## 2025-08-14 DIAGNOSIS — R11.0 NAUSEA: ICD-10-CM

## 2025-08-20 ENCOUNTER — APPOINTMENT (OUTPATIENT)
Dept: CARDIOLOGY | Facility: CLINIC | Age: 35
End: 2025-08-20
Payer: COMMERCIAL

## 2025-08-20 VITALS
DIASTOLIC BLOOD PRESSURE: 75 MMHG | SYSTOLIC BLOOD PRESSURE: 114 MMHG | BODY MASS INDEX: 25.83 KG/M2 | RESPIRATION RATE: 16 BRPM | OXYGEN SATURATION: 99 % | HEIGHT: 65 IN | WEIGHT: 155 LBS | TEMPERATURE: 97.7 F | HEART RATE: 74 BPM

## 2025-08-20 DIAGNOSIS — R00.2 PALPITATIONS: ICD-10-CM

## 2025-08-20 DIAGNOSIS — E78.5 HYPERLIPIDEMIA, UNSPECIFIED: ICD-10-CM

## 2025-08-20 PROCEDURE — G2211 COMPLEX E/M VISIT ADD ON: CPT

## 2025-08-20 PROCEDURE — 99214 OFFICE O/P EST MOD 30 MIN: CPT

## 2025-08-20 PROCEDURE — 93306 TTE W/DOPPLER COMPLETE: CPT

## 2025-08-20 PROCEDURE — 93000 ELECTROCARDIOGRAM COMPLETE: CPT

## 2025-08-25 ENCOUNTER — RX RENEWAL (OUTPATIENT)
Age: 35
End: 2025-08-25

## 2025-09-08 ENCOUNTER — APPOINTMENT (OUTPATIENT)
Dept: ENDOCRINOLOGY | Facility: CLINIC | Age: 35
End: 2025-09-08
Payer: COMMERCIAL

## 2025-09-08 DIAGNOSIS — R73.03 PREDIABETES.: ICD-10-CM

## 2025-09-08 DIAGNOSIS — E66.9 OBESITY, UNSPECIFIED: ICD-10-CM

## 2025-09-08 PROCEDURE — G2211 COMPLEX E/M VISIT ADD ON: CPT | Mod: 95

## 2025-09-08 PROCEDURE — 99214 OFFICE O/P EST MOD 30 MIN: CPT | Mod: 95

## 2025-09-11 ENCOUNTER — RX RENEWAL (OUTPATIENT)
Age: 35
End: 2025-09-11